# Patient Record
Sex: MALE | Race: WHITE | NOT HISPANIC OR LATINO | Employment: OTHER | URBAN - METROPOLITAN AREA
[De-identification: names, ages, dates, MRNs, and addresses within clinical notes are randomized per-mention and may not be internally consistent; named-entity substitution may affect disease eponyms.]

---

## 2018-07-14 ENCOUNTER — APPOINTMENT (EMERGENCY)
Dept: CT IMAGING | Facility: HOSPITAL | Age: 58
DRG: 175 | End: 2018-07-14
Payer: MEDICARE

## 2018-07-14 ENCOUNTER — HOSPITAL ENCOUNTER (INPATIENT)
Facility: HOSPITAL | Age: 58
LOS: 3 days | Discharge: HOME | DRG: 175 | End: 2018-07-17
Attending: EMERGENCY MEDICINE | Admitting: INTERNAL MEDICINE
Payer: MEDICARE

## 2018-07-14 ENCOUNTER — APPOINTMENT (EMERGENCY)
Dept: RADIOLOGY | Facility: HOSPITAL | Age: 58
DRG: 175 | End: 2018-07-14
Payer: MEDICARE

## 2018-07-14 DIAGNOSIS — I26.99 ACUTE PULMONARY EMBOLISM (HCC): ICD-10-CM

## 2018-07-14 DIAGNOSIS — C22.1 CHOLANGIOCARCINOMA (HCC): ICD-10-CM

## 2018-07-14 DIAGNOSIS — I26.99 OTHER ACUTE PULMONARY EMBOLISM WITHOUT ACUTE COR PULMONALE (HCC): Primary | ICD-10-CM

## 2018-07-14 DIAGNOSIS — I26.99 PULMONARY INFARCTION (HCC): ICD-10-CM

## 2018-07-14 PROBLEM — G93.40 ACUTE ENCEPHALOPATHY: Status: ACTIVE | Noted: 2018-07-14

## 2018-07-14 PROBLEM — R50.9 FEVER: Status: ACTIVE | Noted: 2018-07-14

## 2018-07-14 PROBLEM — E87.1 HYPONATREMIA: Status: ACTIVE | Noted: 2018-07-14

## 2018-07-14 LAB
ALBUMIN SERPL BCP-MCNC: 3.1 G/DL (ref 3.5–5)
ALP SERPL-CCNC: 92 U/L (ref 46–116)
ALT SERPL W P-5'-P-CCNC: 35 U/L (ref 12–78)
AMMONIA PLAS-SCNC: <10 UMOL/L (ref 11–35)
ANION GAP BLD CALC-SCNC: 17 MMOL/L (ref 4–13)
ANION GAP SERPL CALCULATED.3IONS-SCNC: 5 MMOL/L (ref 4–13)
APTT PPP: 45 SECONDS (ref 24–36)
AST SERPL W P-5'-P-CCNC: 49 U/L (ref 5–45)
BACTERIA UR QL AUTO: ABNORMAL /HPF
BASOPHILS # BLD AUTO: 0.01 THOUSANDS/ΜL (ref 0–0.1)
BASOPHILS NFR BLD AUTO: 0 % (ref 0–1)
BILIRUB DIRECT SERPL-MCNC: 0.2 MG/DL (ref 0–0.2)
BILIRUB SERPL-MCNC: 0.6 MG/DL (ref 0.2–1)
BILIRUB UR QL STRIP: NEGATIVE
BUN BLD-MCNC: 13 MG/DL (ref 5–25)
BUN SERPL-MCNC: 13 MG/DL (ref 5–25)
CA-I BLD-SCNC: 1.2 MMOL/L (ref 1.12–1.32)
CALCIUM SERPL-MCNC: 8.6 MG/DL (ref 8.3–10.1)
CHLORIDE BLD-SCNC: 92 MMOL/L (ref 100–108)
CHLORIDE SERPL-SCNC: 97 MMOL/L (ref 100–108)
CLARITY UR: CLEAR
CO2 SERPL-SCNC: 29 MMOL/L (ref 21–32)
COLOR UR: YELLOW
CREAT BLD-MCNC: 1.2 MG/DL (ref 0.6–1.3)
CREAT SERPL-MCNC: 1.33 MG/DL (ref 0.6–1.3)
EOSINOPHIL # BLD AUTO: 0.03 THOUSAND/ΜL (ref 0–0.61)
EOSINOPHIL NFR BLD AUTO: 0 % (ref 0–6)
ERYTHROCYTE [DISTWIDTH] IN BLOOD BY AUTOMATED COUNT: 13.7 % (ref 11.6–15.1)
GFR SERPL CREATININE-BSD FRML MDRD: 59 ML/MIN/1.73SQ M
GFR SERPL CREATININE-BSD FRML MDRD: 67 ML/MIN/1.73SQ M
GLUCOSE SERPL-MCNC: 118 MG/DL (ref 65–140)
GLUCOSE SERPL-MCNC: 119 MG/DL (ref 65–140)
GLUCOSE UR STRIP-MCNC: NEGATIVE MG/DL
HCT VFR BLD AUTO: 33.7 % (ref 36.5–49.3)
HCT VFR BLD CALC: 36 % (ref 36.5–49.3)
HGB BLD-MCNC: 10.9 G/DL (ref 12–17)
HGB BLDA-MCNC: 12.2 G/DL (ref 12–17)
HGB UR QL STRIP.AUTO: ABNORMAL
INR PPP: 1.02 (ref 0.86–1.17)
KETONES UR STRIP-MCNC: NEGATIVE MG/DL
LACTATE SERPL-SCNC: 0.9 MMOL/L (ref 0.5–2)
LEUKOCYTE ESTERASE UR QL STRIP: NEGATIVE
LYMPHOCYTES # BLD AUTO: 0.8 THOUSANDS/ΜL (ref 0.6–4.47)
LYMPHOCYTES NFR BLD AUTO: 8 % (ref 14–44)
MCH RBC QN AUTO: 27.5 PG (ref 26.8–34.3)
MCHC RBC AUTO-ENTMCNC: 32.3 G/DL (ref 31.4–37.4)
MCV RBC AUTO: 85 FL (ref 82–98)
MONOCYTES # BLD AUTO: 1.33 THOUSAND/ΜL (ref 0.17–1.22)
MONOCYTES NFR BLD AUTO: 14 % (ref 4–12)
NEUTROPHILS # BLD AUTO: 7.55 THOUSANDS/ΜL (ref 1.85–7.62)
NEUTS SEG NFR BLD AUTO: 78 % (ref 43–75)
NITRITE UR QL STRIP: NEGATIVE
NON-SQ EPI CELLS URNS QL MICRO: ABNORMAL /HPF
PCO2 BLD: 28 MMOL/L (ref 21–32)
PH UR STRIP.AUTO: 5.5 [PH] (ref 4.5–8)
PLATELET # BLD AUTO: 214 THOUSANDS/UL (ref 149–390)
PMV BLD AUTO: 9.6 FL (ref 8.9–12.7)
POTASSIUM BLD-SCNC: 4 MMOL/L (ref 3.5–5.3)
POTASSIUM SERPL-SCNC: 3.9 MMOL/L (ref 3.5–5.3)
PROT SERPL-MCNC: 6.7 G/DL (ref 6.4–8.2)
PROT UR STRIP-MCNC: NEGATIVE MG/DL
PROTHROMBIN TIME: 13.1 SECONDS (ref 11.8–14.2)
RBC # BLD AUTO: 3.97 MILLION/UL (ref 3.88–5.62)
RBC #/AREA URNS AUTO: ABNORMAL /HPF
SODIUM BLD-SCNC: 133 MMOL/L (ref 136–145)
SODIUM SERPL-SCNC: 131 MMOL/L (ref 136–145)
SP GR UR STRIP.AUTO: 1.01 (ref 1–1.03)
SPECIMEN SOURCE: ABNORMAL
TROPONIN I SERPL-MCNC: <0.02 NG/ML
UROBILINOGEN UR QL STRIP.AUTO: 0.2 E.U./DL
WBC # BLD AUTO: 9.72 THOUSAND/UL (ref 4.31–10.16)
WBC #/AREA URNS AUTO: ABNORMAL /HPF

## 2018-07-14 PROCEDURE — 84484 ASSAY OF TROPONIN QUANT: CPT | Performed by: PHYSICIAN ASSISTANT

## 2018-07-14 PROCEDURE — 96361 HYDRATE IV INFUSION ADD-ON: CPT

## 2018-07-14 PROCEDURE — 99223 1ST HOSP IP/OBS HIGH 75: CPT | Performed by: PHYSICIAN ASSISTANT

## 2018-07-14 PROCEDURE — 80048 BASIC METABOLIC PNL TOTAL CA: CPT | Performed by: EMERGENCY MEDICINE

## 2018-07-14 PROCEDURE — 85014 HEMATOCRIT: CPT

## 2018-07-14 PROCEDURE — 71275 CT ANGIOGRAPHY CHEST: CPT

## 2018-07-14 PROCEDURE — 84145 PROCALCITONIN (PCT): CPT | Performed by: PHYSICIAN ASSISTANT

## 2018-07-14 PROCEDURE — 99285 EMERGENCY DEPT VISIT HI MDM: CPT

## 2018-07-14 PROCEDURE — 83605 ASSAY OF LACTIC ACID: CPT | Performed by: EMERGENCY MEDICINE

## 2018-07-14 PROCEDURE — 36415 COLL VENOUS BLD VENIPUNCTURE: CPT | Performed by: EMERGENCY MEDICINE

## 2018-07-14 PROCEDURE — 80076 HEPATIC FUNCTION PANEL: CPT | Performed by: EMERGENCY MEDICINE

## 2018-07-14 PROCEDURE — 81001 URINALYSIS AUTO W/SCOPE: CPT | Performed by: EMERGENCY MEDICINE

## 2018-07-14 PROCEDURE — 82140 ASSAY OF AMMONIA: CPT | Performed by: EMERGENCY MEDICINE

## 2018-07-14 PROCEDURE — 85730 THROMBOPLASTIN TIME PARTIAL: CPT | Performed by: EMERGENCY MEDICINE

## 2018-07-14 PROCEDURE — 93005 ELECTROCARDIOGRAM TRACING: CPT

## 2018-07-14 PROCEDURE — 85025 COMPLETE CBC W/AUTO DIFF WBC: CPT | Performed by: EMERGENCY MEDICINE

## 2018-07-14 PROCEDURE — 74177 CT ABD & PELVIS W/CONTRAST: CPT

## 2018-07-14 PROCEDURE — 85610 PROTHROMBIN TIME: CPT | Performed by: EMERGENCY MEDICINE

## 2018-07-14 PROCEDURE — 94660 CPAP INITIATION&MGMT: CPT

## 2018-07-14 PROCEDURE — 80047 BASIC METABLC PNL IONIZED CA: CPT

## 2018-07-14 PROCEDURE — 96374 THER/PROPH/DIAG INJ IV PUSH: CPT

## 2018-07-14 PROCEDURE — 87040 BLOOD CULTURE FOR BACTERIA: CPT | Performed by: EMERGENCY MEDICINE

## 2018-07-14 PROCEDURE — 96375 TX/PRO/DX INJ NEW DRUG ADDON: CPT

## 2018-07-14 RX ORDER — HEPARIN SODIUM 1000 [USP'U]/ML
6800 INJECTION, SOLUTION INTRAVENOUS; SUBCUTANEOUS ONCE
Status: COMPLETED | OUTPATIENT
Start: 2018-07-14 | End: 2018-07-14

## 2018-07-14 RX ORDER — LIDOCAINE 50 MG/G
1 PATCH TOPICAL DAILY
Status: DISCONTINUED | OUTPATIENT
Start: 2018-07-14 | End: 2018-07-17 | Stop reason: HOSPADM

## 2018-07-14 RX ORDER — OXYCODONE HYDROCHLORIDE 5 MG/1
5 CAPSULE ORAL EVERY 4 HOURS PRN
COMMUNITY
End: 2018-07-17 | Stop reason: HOSPADM

## 2018-07-14 RX ORDER — MORPHINE SULFATE 10 MG/ML
6 INJECTION, SOLUTION INTRAMUSCULAR; INTRAVENOUS ONCE
Status: COMPLETED | OUTPATIENT
Start: 2018-07-14 | End: 2018-07-14

## 2018-07-14 RX ORDER — BUPROPION HYDROCHLORIDE 100 MG/1
150 TABLET ORAL 2 TIMES DAILY
Status: ON HOLD | COMMUNITY
End: 2020-11-06 | Stop reason: SDUPTHER

## 2018-07-14 RX ORDER — LEVOCETIRIZINE DIHYDROCHLORIDE 5 MG/1
10 TABLET, FILM COATED ORAL DAILY PRN
COMMUNITY
End: 2021-02-05 | Stop reason: ALTCHOICE

## 2018-07-14 RX ORDER — ONDANSETRON 2 MG/ML
4 INJECTION INTRAMUSCULAR; INTRAVENOUS ONCE
Status: COMPLETED | OUTPATIENT
Start: 2018-07-14 | End: 2018-07-14

## 2018-07-14 RX ORDER — HEPARIN SODIUM 1000 [USP'U]/ML
3400 INJECTION, SOLUTION INTRAVENOUS; SUBCUTANEOUS AS NEEDED
Status: DISCONTINUED | OUTPATIENT
Start: 2018-07-14 | End: 2018-07-16

## 2018-07-14 RX ORDER — OXYCODONE HYDROCHLORIDE 5 MG/1
5 TABLET ORAL EVERY 6 HOURS PRN
Status: DISCONTINUED | OUTPATIENT
Start: 2018-07-14 | End: 2018-07-15

## 2018-07-14 RX ORDER — ONDANSETRON 2 MG/ML
4 INJECTION INTRAMUSCULAR; INTRAVENOUS EVERY 6 HOURS PRN
Status: DISCONTINUED | OUTPATIENT
Start: 2018-07-14 | End: 2018-07-17 | Stop reason: HOSPADM

## 2018-07-14 RX ORDER — HEPARIN SODIUM 10000 [USP'U]/100ML
3-30 INJECTION, SOLUTION INTRAVENOUS
Status: DISCONTINUED | OUTPATIENT
Start: 2018-07-14 | End: 2018-07-16

## 2018-07-14 RX ORDER — ACAMPROSATE CALCIUM 333 MG/1
666 TABLET, DELAYED RELEASE ORAL 3 TIMES DAILY
Status: DISCONTINUED | OUTPATIENT
Start: 2018-07-14 | End: 2018-07-17 | Stop reason: HOSPADM

## 2018-07-14 RX ORDER — OLANZAPINE 10 MG/1
10 TABLET ORAL
COMMUNITY
End: 2020-11-08 | Stop reason: HOSPADM

## 2018-07-14 RX ORDER — LANOLIN ALCOHOL/MO/W.PET/CERES
1000 CREAM (GRAM) TOPICAL DAILY
COMMUNITY
End: 2020-11-08 | Stop reason: HOSPADM

## 2018-07-14 RX ORDER — MORPHINE SULFATE 2 MG/ML
2 INJECTION, SOLUTION INTRAMUSCULAR; INTRAVENOUS EVERY 4 HOURS PRN
Status: DISCONTINUED | OUTPATIENT
Start: 2018-07-14 | End: 2018-07-17 | Stop reason: HOSPADM

## 2018-07-14 RX ORDER — PANTOPRAZOLE SODIUM 40 MG/1
40 TABLET, DELAYED RELEASE ORAL
Status: DISCONTINUED | OUTPATIENT
Start: 2018-07-15 | End: 2018-07-17 | Stop reason: HOSPADM

## 2018-07-14 RX ORDER — FENTANYL 50 UG/H
50 PATCH TRANSDERMAL
Status: DISCONTINUED | OUTPATIENT
Start: 2018-07-16 | End: 2018-07-17 | Stop reason: HOSPADM

## 2018-07-14 RX ORDER — GABAPENTIN 300 MG/1
300 CAPSULE ORAL 2 TIMES DAILY
Status: DISCONTINUED | OUTPATIENT
Start: 2018-07-14 | End: 2018-07-17 | Stop reason: HOSPADM

## 2018-07-14 RX ORDER — ACAMPROSATE CALCIUM 333 MG/1
666 TABLET, DELAYED RELEASE ORAL 3 TIMES DAILY
COMMUNITY

## 2018-07-14 RX ORDER — LORAZEPAM 1 MG/1
1 TABLET ORAL EVERY 8 HOURS PRN
Status: DISCONTINUED | OUTPATIENT
Start: 2018-07-14 | End: 2018-07-17 | Stop reason: HOSPADM

## 2018-07-14 RX ORDER — HEPARIN SODIUM 1000 [USP'U]/ML
6800 INJECTION, SOLUTION INTRAVENOUS; SUBCUTANEOUS AS NEEDED
Status: DISCONTINUED | OUTPATIENT
Start: 2018-07-14 | End: 2018-07-16

## 2018-07-14 RX ORDER — MONTELUKAST SODIUM 10 MG/1
10 TABLET ORAL
Status: DISCONTINUED | OUTPATIENT
Start: 2018-07-14 | End: 2018-07-17 | Stop reason: HOSPADM

## 2018-07-14 RX ORDER — LORATADINE 10 MG/1
10 TABLET ORAL DAILY
Status: DISCONTINUED | OUTPATIENT
Start: 2018-07-15 | End: 2018-07-17 | Stop reason: HOSPADM

## 2018-07-14 RX ORDER — BUPROPION HYDROCHLORIDE 150 MG/1
300 TABLET ORAL DAILY
Status: DISCONTINUED | OUTPATIENT
Start: 2018-07-15 | End: 2018-07-14

## 2018-07-14 RX ORDER — BUPROPION HYDROCHLORIDE 100 MG/1
150 TABLET ORAL
COMMUNITY
End: 2020-10-29

## 2018-07-14 RX ORDER — CHOLECALCIFEROL (VITAMIN D3) 125 MCG
1000 CAPSULE ORAL DAILY
Status: DISCONTINUED | OUTPATIENT
Start: 2018-07-15 | End: 2018-07-17 | Stop reason: HOSPADM

## 2018-07-14 RX ORDER — PANTOPRAZOLE SODIUM 40 MG/1
80 TABLET, DELAYED RELEASE ORAL 2 TIMES DAILY
COMMUNITY

## 2018-07-14 RX ORDER — BUPROPION HYDROCHLORIDE 150 MG/1
300 TABLET, EXTENDED RELEASE ORAL DAILY
Status: DISCONTINUED | OUTPATIENT
Start: 2018-07-15 | End: 2018-07-17 | Stop reason: HOSPADM

## 2018-07-14 RX ORDER — TRAZODONE HYDROCHLORIDE 150 MG/1
150 TABLET ORAL
COMMUNITY
End: 2021-01-06 | Stop reason: SDUPTHER

## 2018-07-14 RX ORDER — GABAPENTIN 300 MG/1
300 CAPSULE ORAL 2 TIMES DAILY
COMMUNITY

## 2018-07-14 RX ORDER — OLANZAPINE 10 MG/1
10 TABLET ORAL
Status: DISCONTINUED | OUTPATIENT
Start: 2018-07-14 | End: 2018-07-17 | Stop reason: HOSPADM

## 2018-07-14 RX ORDER — SODIUM CHLORIDE 9 MG/ML
75 INJECTION, SOLUTION INTRAVENOUS ONCE
Status: COMPLETED | OUTPATIENT
Start: 2018-07-14 | End: 2018-07-15

## 2018-07-14 RX ORDER — LORAZEPAM 1 MG/1
1 TABLET ORAL EVERY 8 HOURS PRN
COMMUNITY
End: 2020-11-08 | Stop reason: HOSPADM

## 2018-07-14 RX ORDER — BUPROPION HYDROCHLORIDE 150 MG/1
150 TABLET, EXTENDED RELEASE ORAL
Status: DISCONTINUED | OUTPATIENT
Start: 2018-07-14 | End: 2018-07-17 | Stop reason: HOSPADM

## 2018-07-14 RX ORDER — MONTELUKAST SODIUM 10 MG/1
10 TABLET ORAL
COMMUNITY
End: 2021-02-05 | Stop reason: ALTCHOICE

## 2018-07-14 RX ADMIN — OXYCODONE HYDROCHLORIDE 5 MG: 5 TABLET ORAL at 23:29

## 2018-07-14 RX ADMIN — LIDOCAINE 1 PATCH: 50 PATCH CUTANEOUS at 22:23

## 2018-07-14 RX ADMIN — OLANZAPINE 10 MG: 10 TABLET, FILM COATED ORAL at 22:23

## 2018-07-14 RX ADMIN — MORPHINE SULFATE 6 MG: 10 INJECTION INTRAVENOUS at 20:39

## 2018-07-14 RX ADMIN — LORAZEPAM 1 MG: 1 TABLET ORAL at 22:23

## 2018-07-14 RX ADMIN — IOHEXOL 100 ML: 350 INJECTION, SOLUTION INTRAVENOUS at 19:44

## 2018-07-14 RX ADMIN — MORPHINE SULFATE 6 MG: 10 INJECTION INTRAVENOUS at 19:11

## 2018-07-14 RX ADMIN — HEPARIN SODIUM 6800 UNITS: 1000 INJECTION, SOLUTION INTRAVENOUS; SUBCUTANEOUS at 20:56

## 2018-07-14 RX ADMIN — TRAZODONE HYDROCHLORIDE 150 MG: 50 TABLET, FILM COATED ORAL at 23:29

## 2018-07-14 RX ADMIN — MONTELUKAST SODIUM 10 MG: 10 TABLET, FILM COATED ORAL at 22:23

## 2018-07-14 RX ADMIN — SODIUM CHLORIDE 75 ML/HR: 0.9 INJECTION, SOLUTION INTRAVENOUS at 22:26

## 2018-07-14 RX ADMIN — MORPHINE SULFATE 2 MG: 2 INJECTION, SOLUTION INTRAMUSCULAR; INTRAVENOUS at 22:23

## 2018-07-14 RX ADMIN — SODIUM CHLORIDE 1000 ML: 0.9 INJECTION, SOLUTION INTRAVENOUS at 19:05

## 2018-07-14 RX ADMIN — ONDANSETRON 4 MG: 2 INJECTION INTRAMUSCULAR; INTRAVENOUS at 19:11

## 2018-07-14 RX ADMIN — HEPARIN SODIUM AND DEXTROSE 18 UNITS/KG/HR: 10000; 5 INJECTION INTRAVENOUS at 20:57

## 2018-07-14 RX ADMIN — GABAPENTIN 300 MG: 300 CAPSULE ORAL at 22:25

## 2018-07-15 ENCOUNTER — APPOINTMENT (INPATIENT)
Dept: RADIOLOGY | Facility: HOSPITAL | Age: 58
DRG: 175 | End: 2018-07-15
Payer: MEDICARE

## 2018-07-15 LAB
ANION GAP SERPL CALCULATED.3IONS-SCNC: 7 MMOL/L (ref 4–13)
APTT PPP: 177 SECONDS (ref 24–36)
APTT PPP: 52 SECONDS (ref 24–36)
APTT PPP: 65 SECONDS (ref 24–36)
BUN SERPL-MCNC: 11 MG/DL (ref 5–25)
CALCIUM SERPL-MCNC: 8.3 MG/DL (ref 8.3–10.1)
CHLORIDE SERPL-SCNC: 98 MMOL/L (ref 100–108)
CO2 SERPL-SCNC: 28 MMOL/L (ref 21–32)
CREAT SERPL-MCNC: 1.02 MG/DL (ref 0.6–1.3)
ERYTHROCYTE [DISTWIDTH] IN BLOOD BY AUTOMATED COUNT: 13.8 % (ref 11.6–15.1)
FOLATE SERPL-MCNC: 11.9 NG/ML (ref 3.1–17.5)
GFR SERPL CREATININE-BSD FRML MDRD: 81 ML/MIN/1.73SQ M
GLUCOSE SERPL-MCNC: 102 MG/DL (ref 65–140)
HCT VFR BLD AUTO: 29.7 % (ref 36.5–49.3)
HGB BLD-MCNC: 9.4 G/DL (ref 12–17)
MCH RBC QN AUTO: 27.1 PG (ref 26.8–34.3)
MCHC RBC AUTO-ENTMCNC: 31.6 G/DL (ref 31.4–37.4)
MCV RBC AUTO: 86 FL (ref 82–98)
PLATELET # BLD AUTO: 200 THOUSANDS/UL (ref 149–390)
PMV BLD AUTO: 10.4 FL (ref 8.9–12.7)
POTASSIUM SERPL-SCNC: 3.8 MMOL/L (ref 3.5–5.3)
PROCALCITONIN SERPL-MCNC: 0.27 NG/ML
RBC # BLD AUTO: 3.47 MILLION/UL (ref 3.88–5.62)
SODIUM SERPL-SCNC: 133 MMOL/L (ref 136–145)
TROPONIN I SERPL-MCNC: <0.02 NG/ML
TROPONIN I SERPL-MCNC: <0.02 NG/ML
TSH SERPL DL<=0.05 MIU/L-ACNC: 3.6 UIU/ML (ref 0.36–3.74)
VIT B12 SERPL-MCNC: 854 PG/ML (ref 100–900)
WBC # BLD AUTO: 6.62 THOUSAND/UL (ref 4.31–10.16)

## 2018-07-15 PROCEDURE — 74022 RADEX COMPL AQT ABD SERIES: CPT

## 2018-07-15 PROCEDURE — 82607 VITAMIN B-12: CPT | Performed by: PHYSICIAN ASSISTANT

## 2018-07-15 PROCEDURE — 84443 ASSAY THYROID STIM HORMONE: CPT | Performed by: PHYSICIAN ASSISTANT

## 2018-07-15 PROCEDURE — 99223 1ST HOSP IP/OBS HIGH 75: CPT | Performed by: INTERNAL MEDICINE

## 2018-07-15 PROCEDURE — 94660 CPAP INITIATION&MGMT: CPT

## 2018-07-15 PROCEDURE — 85730 THROMBOPLASTIN TIME PARTIAL: CPT | Performed by: PHYSICIAN ASSISTANT

## 2018-07-15 PROCEDURE — 99232 SBSQ HOSP IP/OBS MODERATE 35: CPT | Performed by: INTERNAL MEDICINE

## 2018-07-15 PROCEDURE — 84484 ASSAY OF TROPONIN QUANT: CPT | Performed by: PHYSICIAN ASSISTANT

## 2018-07-15 PROCEDURE — 85303 CLOT INHIBIT PROT C ACTIVITY: CPT | Performed by: INTERNAL MEDICINE

## 2018-07-15 PROCEDURE — 82746 ASSAY OF FOLIC ACID SERUM: CPT | Performed by: PHYSICIAN ASSISTANT

## 2018-07-15 PROCEDURE — 85306 CLOT INHIBIT PROT S FREE: CPT | Performed by: INTERNAL MEDICINE

## 2018-07-15 PROCEDURE — 85730 THROMBOPLASTIN TIME PARTIAL: CPT | Performed by: INTERNAL MEDICINE

## 2018-07-15 PROCEDURE — 81241 F5 GENE: CPT | Performed by: INTERNAL MEDICINE

## 2018-07-15 PROCEDURE — 80048 BASIC METABOLIC PNL TOTAL CA: CPT | Performed by: PHYSICIAN ASSISTANT

## 2018-07-15 PROCEDURE — 85027 COMPLETE CBC AUTOMATED: CPT | Performed by: PHYSICIAN ASSISTANT

## 2018-07-15 PROCEDURE — 86147 CARDIOLIPIN ANTIBODY EA IG: CPT | Performed by: INTERNAL MEDICINE

## 2018-07-15 RX ORDER — DRONABINOL 2.5 MG/1
10 CAPSULE ORAL
Status: DISCONTINUED | OUTPATIENT
Start: 2018-07-15 | End: 2018-07-17 | Stop reason: HOSPADM

## 2018-07-15 RX ORDER — OXYCODONE HYDROCHLORIDE 10 MG/1
10 TABLET ORAL EVERY 6 HOURS PRN
Status: DISCONTINUED | OUTPATIENT
Start: 2018-07-15 | End: 2018-07-17 | Stop reason: HOSPADM

## 2018-07-15 RX ADMIN — MORPHINE SULFATE 2 MG: 2 INJECTION, SOLUTION INTRAMUSCULAR; INTRAVENOUS at 12:48

## 2018-07-15 RX ADMIN — HEPARIN SODIUM 3400 UNITS: 1000 INJECTION, SOLUTION INTRAVENOUS; SUBCUTANEOUS at 17:41

## 2018-07-15 RX ADMIN — OXYCODONE HYDROCHLORIDE 10 MG: 10 TABLET ORAL at 22:26

## 2018-07-15 RX ADMIN — TRAZODONE HYDROCHLORIDE 150 MG: 50 TABLET, FILM COATED ORAL at 22:25

## 2018-07-15 RX ADMIN — ACAMPROSATE CALCIUM 666 MG: 333 TABLET, DELAYED RELEASE ORAL at 22:27

## 2018-07-15 RX ADMIN — MORPHINE SULFATE 2 MG: 2 INJECTION, SOLUTION INTRAMUSCULAR; INTRAVENOUS at 07:42

## 2018-07-15 RX ADMIN — PANTOPRAZOLE SODIUM 40 MG: 40 TABLET, DELAYED RELEASE ORAL at 16:30

## 2018-07-15 RX ADMIN — CYANOCOBALAMIN TAB 500 MCG 1000 MCG: 500 TAB at 08:27

## 2018-07-15 RX ADMIN — BUPROPION HYDROCHLORIDE 300 MG: 150 TABLET, FILM COATED, EXTENDED RELEASE ORAL at 08:27

## 2018-07-15 RX ADMIN — DRONABINOL 10 MG: 2.5 CAPSULE ORAL at 17:55

## 2018-07-15 RX ADMIN — GABAPENTIN 300 MG: 300 CAPSULE ORAL at 08:27

## 2018-07-15 RX ADMIN — HEPARIN SODIUM AND DEXTROSE 15 UNITS/KG/HR: 10000; 5 INJECTION INTRAVENOUS at 15:26

## 2018-07-15 RX ADMIN — LIDOCAINE 1 PATCH: 50 PATCH CUTANEOUS at 22:27

## 2018-07-15 RX ADMIN — PANTOPRAZOLE SODIUM 40 MG: 40 TABLET, DELAYED RELEASE ORAL at 08:28

## 2018-07-15 RX ADMIN — LORATADINE 10 MG: 10 TABLET ORAL at 08:27

## 2018-07-15 RX ADMIN — OLANZAPINE 10 MG: 10 TABLET, FILM COATED ORAL at 22:26

## 2018-07-15 RX ADMIN — MONTELUKAST SODIUM 10 MG: 10 TABLET, FILM COATED ORAL at 08:34

## 2018-07-15 RX ADMIN — GABAPENTIN 300 MG: 300 CAPSULE ORAL at 16:30

## 2018-07-15 RX ADMIN — LORAZEPAM 1 MG: 1 TABLET ORAL at 08:34

## 2018-07-15 RX ADMIN — OXYCODONE HYDROCHLORIDE 10 MG: 10 TABLET ORAL at 16:29

## 2018-07-15 RX ADMIN — OXYCODONE HYDROCHLORIDE 5 MG: 5 TABLET ORAL at 11:03

## 2018-07-15 RX ADMIN — MORPHINE SULFATE 2 MG: 2 INJECTION, SOLUTION INTRAMUSCULAR; INTRAVENOUS at 21:02

## 2018-07-15 RX ADMIN — LORAZEPAM 1 MG: 1 TABLET ORAL at 21:03

## 2018-07-15 RX ADMIN — ACAMPROSATE CALCIUM 666 MG: 333 TABLET, DELAYED RELEASE ORAL at 08:29

## 2018-07-15 RX ADMIN — ACAMPROSATE CALCIUM 666 MG: 333 TABLET, DELAYED RELEASE ORAL at 16:31

## 2018-07-15 NOTE — PROGRESS NOTES
[facility] Progress Note  Patient: Amadeo Watson  62 y o  male   MRN: [id number]  PCP: Modesto Gregg MD  Unit/Bed#: -01 Encounter: [id number]  Date Of Visit: 07/15/18    Assessment:    Principal Problem:    Acute pulmonary embolism ([facility] )  Active Problems:    Cholangiocarcinoma ([facility] )    Hyponatremia    Fever    Acute encephalopathy    SHELLIE (obstructive sleep apnea)      Plan:    · Acute Pulmonary Embolism with Pulmonary Infarction -   · Anticoagulation - Heparin drip with intended plan to change to Xarelto on discharge (per discussion between ER and [facility] oncologist 7/14/2018)  Prescriptions to case management to see about costs  · Hypercoagulability panel - Follow up Protein C, Protein S, cardiolipin antibody, Factor V Leiden ordered and pending  · Oxygenation / Ventilation - 95-96% on room air  · Pain Control -   · Oxycodone and morphine available  Will increase the oxycodone to 10 mg dose  · Monitor Pain levels  · Anemia - This may be chronic from as far back as 2015  Continue to monitor counts and if dropping below 9, additional workup may be needed  CBC in am   · Acute Encephalopathy POA -   · Per wife, this is better today  Continue to monitor  · Negative Testing - NH3, TSH, Urinalysis  · Additional Testing -   · Medication Review - Wellbutrin, Campral, Gabapentin, Zyprexa, loratidine, Trazodone, oxycodone, and ativan are all potential contributors  Currently also morphine is an additional cause potentially  · Hyponatremia - Improved with IV fluids  Continue to monitor  · Abdominal distention - because increase tympany on percussion, will start with obstruction series xray  If this is negative get CT abdomen or abdominal ultrasound to evaluate for ascites  Monitor BMs  He has never had problems with ascites before but has liver disease and has been on IV fluids  · History of Cholangiocarcinoma - patient is s/p liver resection, chemotherapy, and radiation in 2016    Follows with [facility] and is not currently on any active therapy  Surveillance with CT scans every 3 months  VTE Pharmacologic Prophylaxis:   Pharmacologic: Heparin Drip  Mechanical VTE Prophylaxis in Place: Yes    Patient Centered Rounds: I have performed bedside rounds with nursing staff today  Discussions with Specialists or Other Care Team Provider: None    Education and Discussions with Family / Patient: updated wife at bedside  Time Spent for Care: 30 minutes  More than 50% of total time spent on counseling and coordination of care as described above  Current Length of Stay: 1 day(s)    Current Patient Status: Inpatient   Certification Statement: The patient will continue to require additional inpatient hospital stay due to evaluate patient for pulmonary embolism  Discharge Plan / Estimated Discharge Date: Next 24 hours? Code Status: Level 1 - Full Code      Subjective: The patient is currently on oxygen but earlier was on room air and saturating well  He denies any dyspnea  He does have sleep apnea and states he woke up on oxygen again  The patient has no nausea, headache, vomiting, or palpitations  He has pleuritic chest pain right lower chest   The patients chest pain is reproducible with palpation of chest   He coughed up coin sized amount of blood earlier today  Objective:     Vitals:   Temp (24hrs), Av 8 °F (37 7 °C), Min:98 9 °F (37 2 °C), Max:100 4 °F (38 °C)    HR:  [] 72  Resp:  [18] 18  BP: ()/(54-61) 111/55  SpO2:  [94 %-97 %] 95 %  Body mass index is 26 76 kg/m²  Input and Output Summary (last 24 hours): Intake/Output Summary (Last 24 hours) at 07/15/18 1443  Last data filed at 18 2300   Gross per 24 hour   Intake                0 ml   Output                0 ml   Net                0 ml       Physical Exam:     Physical Exam   Constitutional: He is oriented to person, place, and time  No distress     HENT:   Mouth/Throat: Oropharynx is clear and moist  No oropharyngeal exudate  Eyes: Conjunctivae are normal  Pupils are equal, round, and reactive to light  Neck: No JVD present  Cardiovascular: Normal rate and regular rhythm  No murmur heard  Pulmonary/Chest: Effort normal  He has no wheezes  Slight rales in right lower lung field posteriorly  Abdominal: He exhibits distension  There is no tenderness  Slightly firm  Hyperresonance to percussion  Slightly hyperactive bowel sounds  Neurological: He is alert and oriented to person, place, and time  No cranial nerve deficit  Skin: Skin is warm and dry  No rash noted  No pallor  Vitals reviewed  Additional Data:     Labs:      Results from last 7 days  Lab Units 07/15/18  0518  07/14/18  1906   WBC Thousand/uL 6 62  --  9 72   HEMOGLOBIN g/dL 9 4*  --  10 9*   I STAT HEMOGLOBIN   --   < >  --    HEMATOCRIT % 29 7*  --  33 7*   PLATELETS Thousands/uL 200  --  214   NEUTROS PCT %  --   --  78*   LYMPHS PCT %  --   --  8*   MONOS PCT %  --   --  14*   EOS PCT %  --   --  0   < > = values in this interval not displayed  Results from last 7 days  Lab Units 07/15/18  0518  07/14/18  1906   SODIUM mmol/L 133*  --  131*   POTASSIUM mmol/L 3 8  --  3 9   CHLORIDE mmol/L 98*  --  97*   CO2 mmol/L 28  --  29   BUN mg/dL 11  --  13   CREATININE mg/dL 1 02  --  1 33*   CALCIUM mg/dL 8 3  --  8 6   TOTAL PROTEIN g/dL  --   --  6 7   BILIRUBIN TOTAL mg/dL  --   --  0 60   ALK PHOS U/L  --   --  92   ALT U/L  --   --  35   AST U/L  --   --  49*   GLUCOSE RANDOM mg/dL 102  --  119   GLUCOSE, ISTAT   --   < >  --    < > = values in this interval not displayed  Results from last 7 days  Lab Units 07/14/18  1906   INR  1 02       * I Have Reviewed All Lab Data Listed Above  * Additional Pertinent Lab Tests Reviewed:  All Labs For Current Hospital Admission Reviewed    Imaging:    Imaging Reports Reviewed Today Include: CT Chest  Imaging Personally Reviewed by Myself Includes: None    Recent Cultures (last 7 days):           Last 24 Hours Medication List:     Current Facility-Administered Medications:  acamprosate 666 mg Oral TID Russ Waller PA-C    buPROPion 150 mg Oral HS Russ Waller PA-C    buPROPion 300 mg Oral Daily Russ Waller PA-C    cyanocobalamin 1,000 mcg Oral Daily Allyn Hutchinson    [START ON 7/16/2018] fentaNYL 50 mcg Transdermal Q72H Russ Waller PA-C    gabapentin 300 mg Oral BID Russ Waller PA-C    heparin (porcine) 3-30 Units/kg/hr (Order-Specific) Intravenous Titrated Luna Callow, DO Last Rate: 15 Units/kg/hr (07/15/18 0459)   heparin (porcine) 3,400 Units Intravenous PRN Luna Callow, DO    heparin (porcine) 6,800 Units Intravenous PRN Luna Callow, DO    lidocaine 1 patch Transdermal Daily Russ Waller PA-C    loratadine 10 mg Oral Daily Yara Kelley PA-C    LORazepam 1 mg Oral Q8H PRN Russ Waller PA-C    montelukast 10 mg Oral HS Russ Waller PA-C    morphine injection 2 mg Intravenous Q4H PRN Russ Waller PA-C    OLANZapine 10 mg Oral HS Yara Kelley PA-C    ondansetron 4 mg Intravenous Q6H PRN Russ Waller PA-C    oxyCODONE 5 mg Oral Q6H PRN Russ Waller PA-C    pantoprazole 40 mg Oral BID AC Russ Waller PA-C    traZODone 150 mg Oral HS Russ Waller PA-C         Today, Patient Was Seen By: Merritt Vaughan DO    ** Please Note: This note has been constructed using a voice recognition system   **

## 2018-07-15 NOTE — ASSESSMENT & PLAN NOTE
· Temp of 100 4 on presentation  Also tachycardic on presentation and noted by family to have increased confusion today  · Continue to monitor vital signs and neuro checks  · No leukocytosis, lactic acid WNL  · CTA chest: Consolidation in the right lower lobe peripherally suspicious for infarct with groundglass in the right lower lobe suggesting ischemia  · Check urinalysis/ urine culture  · Follow-up on blood cultures  · Check procalcitonin

## 2018-07-15 NOTE — ASSESSMENT & PLAN NOTE
· S/p liver resection, chemotherapy and radiation therapy in 2016  · Follows with oncology at [facility]  Not currently receiving therapy, on observation with CT scans every 3 months

## 2018-07-15 NOTE — PROGRESS NOTES
Pt ambulated around half of the unit with daughter, tolerated the activity, will continue to monitor

## 2018-07-15 NOTE — ASSESSMENT & PLAN NOTE
· Presented with right sided chest pain and SOB with exertion  · CTA chest: Acute right lower lobe lobar and segmental pulmonary emboli  Measured RV/LV ratio is within normal limits at less than 0 9  Consolidation in the right lower lobe peripherally suspicious for infarct with groundglass in the right lower lobe suggesting ischemia  · Obtain echocardiogram   · Continue IV heparin  · Per discussion with ED physician who spoke with patient's oncologist at [facility], patient should be transitioned to [address] [place] [address] on discharge  · Patient with reported hemoptysis today, possibly due to PE  Pulmonology consulted  · Pain control with lidocaine patch, prn oxycodone and prn morphine in addition to home medication, Fentanyl patch  · Pulse ox stable on 2L nasal cannula  · Continue to monitor  Ambulatory pulse ox prior to d/c

## 2018-07-15 NOTE — ED PROVIDER NOTES
History  Chief Complaint   Patient presents with    Abdominal Pain     pt reports "severe abd pain" starting friday night  Reports R lateral across epigastric  reprots nausea, no vomiting  Pt lives in [place], is under care [facility], was told to come as close to them as possible  Pt has hx liver resection  History provided by:  Patient, relative and spouse  Abdominal Pain   Pain location:  RUQ  Pain quality: aching and sharp    Pain radiates to:  Does not radiate  Pain severity:  Severe  Onset quality:  Gradual  Duration:  3 days  Timing:  Intermittent  Progression:  Worsening  Chronicity:  New  Context comment:  Extensive cancer history, sharp severe right upper quadrant abdominal pain  Relieved by:  Nothing  Worsened by:  Deep breathing and palpation  Ineffective treatments:  None tried  Associated symptoms: no chest pain, no chills, no constipation, no cough, no diarrhea, no dysuria, no fever, no nausea, no shortness of breath, no sore throat and no vomiting        Prior to Admission Medications   Prescriptions Last Dose Informant Patient Reported? Taking?    LORazepam (ATIVAN) 1 mg tablet   Yes Yes   Sig: Take 1 mg by mouth every 8 (eight) hours as needed for anxiety   OLANZapine (ZyPREXA) 10 mg tablet   Yes Yes   Sig: Take 10 mg by mouth daily at bedtime   acamprosate (CAMPRAL) 333 mg tablet   Yes Yes   Sig: Take 666 mg by mouth 3 (three) times a day   buPROPion (WELLBUTRIN) 100 mg tablet   Yes Yes   Sig: Take 300 mg by mouth daily   buPROPion (WELLBUTRIN) 100 mg tablet   Yes Yes   Sig: Take 150 mg by mouth daily at bedtime   cyanocobalamin (VITAMIN B-12) 1,000 mcg tablet   Yes Yes   Sig: Take 1,000 mcg by mouth daily   fentaNYL (DURAGESIC) 75 mcg/hr   Yes Yes   Sig: Place 1 patch on the skin every third day   gabapentin (NEURONTIN) 300 mg capsule   Yes Yes   Sig: Take 300 mg by mouth 2 (two) times a day   levocetirizine (XYZAL) 5 MG tablet   Yes Yes   Sig: Take 10 mg by mouth every morning   milk thistle 175 MG tablet   Yes Yes   Sig: Take 175 mg by mouth 2 (two) times a day   montelukast (SINGULAIR) 10 mg tablet   Yes Yes   Sig: Take 10 mg by mouth daily at bedtime   oxyCODONE (OXY-IR) 5 MG capsule   Yes Yes   Sig: Take 5 mg by mouth every 4 (four) hours as needed for moderate pain   pantoprazole (PROTONIX) 40 mg tablet   Yes Yes   Sig: Take 40 mg by mouth 2 (two) times a day   traZODone (DESYREL) 150 mg tablet   Yes Yes   Sig: Take 150 mg by mouth daily at bedtime      Facility-Administered Medications: None       Past Medical History:   Diagnosis Date    [facility] [address] [facility]     Peptic ulcer     Zollinger-Waddell syndrome        Past Surgical History:   Procedure Laterality Date    LIVER RESECTION      STOMACH SURGERY         History reviewed  No pertinent family history  I have reviewed and agree with the history as documented  Social History   Substance Use Topics    Smoking status: Not on file    Smokeless tobacco: Not on file    Alcohol use Not on file        Review of Systems   Constitutional: Negative for activity change, chills, diaphoresis and fever  HENT: Negative for congestion, sinus pressure and sore throat  Eyes: Negative for pain and visual disturbance  Respiratory: Negative for cough, chest tightness, shortness of breath, wheezing and stridor  Cardiovascular: Negative for chest pain and palpitations  Gastrointestinal: Positive for abdominal pain  Negative for abdominal distention, constipation, diarrhea, nausea and vomiting  Genitourinary: Negative for dysuria and frequency  Musculoskeletal: Negative for neck pain and neck stiffness  Skin: Negative for rash  Neurological: Negative for dizziness, speech difficulty, light-headedness, numbness and headaches  Physical Exam  Physical Exam   Constitutional: He is oriented to person, place, and time  He appears well-developed  He appears distressed  HENT:   Head: Normocephalic and atraumatic     Eyes: Pupils are equal, round, and reactive to light  Neck: Normal range of motion  Neck supple  No tracheal deviation present  Cardiovascular: Normal rate, regular rhythm, normal heart sounds and intact distal pulses  No murmur heard  Pulmonary/Chest: Effort normal and breath sounds normal  No stridor  No respiratory distress  Abdominal: Soft  He exhibits no distension  There is tenderness  There is no rebound and no guarding  Right upper quadrant   Musculoskeletal: Normal range of motion  Neurological: He is alert and oriented to person, place, and time  Skin: Skin is warm and dry  He is not diaphoretic  No erythema  No pallor  Psychiatric: He has a normal mood and affect  Vitals reviewed        Vital Signs  ED Triage Vitals   Temperature Pulse Respirations Blood Pressure SpO2   07/14/18 1841 07/14/18 1840 07/14/18 1840 07/14/18 1840 07/14/18 1840   100 4 °F (38 °C) 100 18 132/58 95 %      Temp Source Heart Rate Source Patient Position - Orthostatic VS BP Location FiO2 (%)   07/14/18 1841 07/14/18 1840 07/14/18 1840 07/14/18 1840 --   Oral Monitor Lying Right arm       Pain Score       07/14/18 1841       Worst Possible Pain           Vitals:    07/14/18 2034 07/14/18 2100 07/14/18 2130 07/14/18 2159   BP: 98/54 131/60 106/55 121/58   Pulse: 83 86 82 86   Patient Position - Orthostatic VS: Lying   Lying       Visual Acuity  Visual Acuity      Most Recent Value   L Pupil Size (mm)  (P) 3   R Pupil Size (mm)  (P) 3   L Pupil Shape  (P) Round   R Pupil Shape  (P) Round          ED Medications  Medications   heparin (porcine) 25,000 units in 250 mL infusion (premix) (18 Units/kg/hr × 85 kg (Order-Specific) Intravenous New Bag 7/14/18 2057)   heparin (porcine) injection 6,800 Units (not administered)   heparin (porcine) injection 3,400 Units (not administered)   acamprosate (CAMPRAL) DR tablet 666 mg (666 mg Oral Not Given 7/14/18 2206)   buPROPion (WELLBUTRIN SR) 12 hr tablet 150 mg (150 mg Oral Not Given 7/14/18 2224)   cyanocobalamin (VITAMIN B-12) tablet 1,000 mcg (not administered)   gabapentin (NEURONTIN) capsule 300 mg (300 mg Oral Given 7/14/18 2225)   loratadine (CLARITIN) tablet 10 mg (not administered)   montelukast (SINGULAIR) tablet 10 mg (10 mg Oral Given 7/14/18 2223)   OLANZapine (ZyPREXA) tablet 10 mg (10 mg Oral Given 7/14/18 2223)   oxyCODONE (ROXICODONE) IR tablet 5 mg (not administered)   pantoprazole (PROTONIX) EC tablet 40 mg (not administered)   traZODone (DESYREL) tablet 150 mg (not administered)   LORazepam (ATIVAN) tablet 1 mg (1 mg Oral Given 7/14/18 2223)   fentaNYL (DURAGESIC) 50 mcg/hr TD 72 hr patch 50 mcg (not administered)   ondansetron (ZOFRAN) injection 4 mg (not administered)   lidocaine (LIDODERM) 5 % patch 1 patch (1 patch Transdermal Medication Applied 7/14/18 2223)   morphine injection 2 mg (2 mg Intravenous Given 7/14/18 2223)   buPROPion (WELLBUTRIN SR) 12 hr tablet 300 mg (not administered)   sodium chloride 0 9 % bolus 1,000 mL (0 mL Intravenous Stopped 7/14/18 2120)   ondansetron (ZOFRAN) injection 4 mg (4 mg Intravenous Given 7/14/18 1911)   morphine (PF) 10 mg/mL injection 6 mg (6 mg Intravenous Given 7/14/18 1911)   iohexol (OMNIPAQUE) 350 MG/ML injection (MULTI-DOSE) 100 mL (100 mL Intravenous Given 7/14/18 1944)   morphine (PF) 10 mg/mL injection 6 mg (6 mg Intravenous Given 7/14/18 2039)   heparin (porcine) injection 6,800 Units (6,800 Units Intravenous Given 7/14/18 2056)   sodium chloride 0 9 % infusion (75 mL/hr Intravenous New Bag 7/14/18 2226)       Diagnostic Studies  Results Reviewed     Procedure Component Value Units Date/Time    Procalcitonin [[id number]] Collected:  07/14/18 2235    Lab Status:   In process Specimen:  Blood from Arm, Left Updated:  07/14/18 2240    Urine Microscopic [[id number]]  (Abnormal) Collected:  07/14/18 2139    Lab Status:  Final result Specimen:  Urine from Urine, Clean Catch Updated:  07/14/18 2158     RBC, UA 0-1 (A) /hpf      WBC, UA None Seen /hpf      Epithelial Cells None Seen /hpf      Bacteria, UA Occasional /hpf     UA w Reflex to Microscopic w Reflex to Culture [[id number]]  (Abnormal) Collected:  07/14/18 2139    Lab Status:  Final result Specimen:  Urine from Urine, Clean Catch Updated:  07/14/18 2148     Color, UA Yellow     Clarity, UA Clear     Specific Gravity, UA 1 010     pH, UA 5 5     Leukocytes, UA Negative     Nitrite, UA Negative     Protein, UA Negative mg/dl      Glucose, UA Negative mg/dl      Ketones, UA Negative mg/dl      Urobilinogen, UA 0 2 E U /dl      Bilirubin, UA Negative     Blood, UA Small (A)    APTT [[id number]]     Lab Status:  No result Specimen:  Blood     Protime-INR [[id number]]  (Normal) Collected:  07/14/18 1906    Lab Status:  Final result Specimen:  Blood from Central Venous Line Updated:  07/14/18 1947     Protime 13 1 seconds      INR 1 02    APTT [[id number]]  (Abnormal) Collected:  07/14/18 1906    Lab Status:  Final result Specimen:  Blood from Central Venous Line Updated:  07/14/18 1947     PTT 45 (H) seconds     Basic metabolic panel [[id number]]  (Abnormal) Collected:  07/14/18 1906    Lab Status:  Final result Specimen:  Blood from Central Venous Line Updated:  07/14/18 1946     Sodium 131 (L) mmol/L      Potassium 3 9 mmol/L      Chloride 97 (L) mmol/L      CO2 29 mmol/L      Anion Gap 5 mmol/L      BUN 13 mg/dL      Creatinine 1 33 (H) mg/dL      Glucose 119 mg/dL      Calcium 8 6 mg/dL      eGFR 59 ml/min/1 73sq m     Narrative:         National Kidney Disease Education Program recommendations are as follows:  GFR calculation is accurate only with a steady state creatinine  Chronic Kidney disease less than 60 ml/min/1 73 sq  meters  Kidney failure less than 15 ml/min/1 73 sq  meters      Hepatic function panel [[id number]]  (Abnormal) Collected:  07/14/18 1906    Lab Status:  Final result Specimen:  Blood from Central Venous Line Updated:  07/14/18 1946     Total Bilirubin 0 60 mg/dL      Bilirubin, Direct 0 20 mg/dL      Alkaline Phosphatase 92 U/L      AST 49 (H) U/L      ALT 35 U/L      Total Protein 6 7 g/dL      Albumin 3 1 (L) g/dL     Ammonia [[id number]]  (Abnormal) Collected:  07/14/18 1906    Lab Status:  Final result Specimen:  Blood from Central Venous Line Updated:  07/14/18 1939     Ammonia <10 (L) umol/L     Blood culture #1 [[id number]] Collected:  07/14/18 1929    Lab Status: In process Specimen:  Blood from Arm, Right Updated:  07/14/18 1934    Lactic acid, plasma [[id number]]  (Normal) Collected:  07/14/18 1906    Lab Status:  Final result Specimen:  Blood from Central Venous Line Updated:  07/14/18 1933     LACTIC ACID 0 9 mmol/L     Narrative:         Result may be elevated if tourniquet was used during collection  CBC and differential [[id number]]  (Abnormal) Collected:  07/14/18 1906    Lab Status:  Final result Specimen:  Blood from Central Venous Line Updated:  07/14/18 1926     WBC 9 72 Thousand/uL      RBC 3 97 Million/uL      Hemoglobin 10 9 (L) g/dL      Hematocrit 33 7 (L) %      MCV 85 fL      MCH 27 5 pg      MCHC 32 3 g/dL      RDW 13 7 %      MPV 9 6 fL      Platelets 505 Thousands/uL      Neutrophils Relative 78 (H) %      Lymphocytes Relative 8 (L) %      Monocytes Relative 14 (H) %      Eosinophils Relative 0 %      Basophils Relative 0 %      Neutrophils Absolute 7 55 Thousands/µL      Lymphocytes Absolute 0 80 Thousands/µL      Monocytes Absolute 1 33 (H) Thousand/µL      Eosinophils Absolute 0 03 Thousand/µL      Basophils Absolute 0 01 Thousands/µL     Blood culture #2 [[id number]] Collected:  07/14/18 1921    Lab Status:   In process Specimen:  Blood from Arm, Left Updated:  07/14/18 1923    POCT Chem 8+ [[id number]]  (Abnormal) Collected:  07/14/18 1915    Lab Status:  Final result Updated:  07/14/18 1920     SODIUM, I-STAT 133 (L) mmol/l      Potassium, i-STAT 4 0 mmol/L      Chloride, istat 92 (L) mmol/L      CO2, i-STAT 28 mmol/L      Anion Gap, Istat 17 (H) mmol/L Calcium, Ionized i-STAT 1 20 mmol/L      BUN, I-STAT 13 mg/dl      Creatinine, i-STAT 1 2 mg/dl      eGFR 67 ml/min/1 73sq m      Glucose, i-STAT 118 mg/dl      Hct, i-STAT 36 (L) %      Hgb, i-STAT 12 2 g/dl      Specimen Type VENOUS                 CTA chest ct abdomen pelvis w contrast   Final Result by Yovany Swift MD (07/14 2018)      Acute right lower lobe lobar and segmental pulmonary emboli  Measured RV/LV ratio is within normal limits at less than 0 9  Consolidation in the right lower lobe peripherally suspicious for infarct with groundglass in the right lower lobe suggesting ischemia  Hiatal hernia  I personally discussed this study with Fátima Sullivan on 7/14/2018 at 8:11 PM                      Workstation performed: [id number]                    Procedures  ECG 12 Lead Documentation  Date/Time: 7/14/2018 10:52 PM  Performed by: Evangelina Soares by: Sarah Rosales     ECG reviewed by me, the ED Provider: yes    Patient location:  ED  Previous ECG:     Previous ECG:  Compared to current    Comparison ECG info:  10 23 2015  Interpretation:     Interpretation: non-specific    Rate:     ECG rate:  81  Rhythm:     Rhythm: sinus rhythm    Ectopy:     Ectopy: none    QRS:     QRS axis:  Normal    QRS intervals:  Normal  Conduction:     Conduction: normal    ST segments:     ST segments:  Non-specific  T waves:     T waves: non-specific             Phone Contacts  ED Phone Contact    ED Course  ED Course as of Jul 14 2253   Sat Jul 14, 2018 2021 Patient found to have pulmonary infarction  , discussed case with patient's Dr  at [facility], Dr eGne Dempsey [phone])    he is agreeable to start patient on heparin drip admit to hospital is requesting patient be discharged on Xarelto for ultimate treatment                                MDM  Number of Diagnoses or Management Options  Other acute pulmonary embolism without acute cor pulmonale ([facility] [address] ): new and requires workup  Pulmonary infarction [facility]): new and requires workup     Amount and/or Complexity of Data Reviewed  Clinical lab tests: ordered and reviewed  Tests in the radiology section of CPT®: ordered and reviewed  Decide to obtain previous medical records or to obtain history from someone other than the patient: yes  Obtain history from someone other than the patient: yes  Review and summarize past medical records: yes  Discuss the patient with other providers: yes  Independent visualization of images, tracings, or specimens: yes      CritCare Time    Disposition  Final diagnoses:   Other acute pulmonary embolism without acute cor pulmonale ([facility] )   Pulmonary infarction ([facility] )     Time reflects when diagnosis was documented in both MDM as applicable and the Disposition within this note     Time User Action Codes Description Comment    7/14/2018  8:22 PM Arlette Chi Add [I26 99] Other acute pulmonary embolism without acute cor pulmonale ([facility] )     7/14/2018  8:22 PM Arlette Chi Add [I26 99] Pulmonary infarction ([facility] )     7/14/2018  9:20 PM Candy Paola E Add [I26 99] Acute pulmonary embolism ([facility] )     7/14/2018  9:20 PM Candy Paola E Modify [I26 99] Acute pulmonary embolism [facility])       ED Disposition     ED Disposition Condition Comment    Admit  Case was discussed with PATRIZIA LOZADA [address] [place] [address] and the patient's admission status was agreed to be Admission Status: inpatient status to the service of Dr Nola Michelle   Follow-up Information    None         Current Discharge Medication List      CONTINUE these medications which have NOT CHANGED    Details   acamprosate (CAMPRAL) 333 mg tablet Take 666 mg by mouth 3 (three) times a day      !! buPROPion (WELLBUTRIN) 100 mg tablet Take 300 mg by mouth daily      !!  buPROPion (WELLBUTRIN) 100 mg tablet Take 150 mg by mouth daily at bedtime      cyanocobalamin (VITAMIN B-12) 1,000 mcg tablet Take 1,000 mcg by mouth daily      fentaNYL (DURAGESIC) 75 mcg/hr Place 1 patch on the skin every third day      gabapentin (NEURONTIN) 300 mg capsule Take 300 mg by mouth 2 (two) times a day      levocetirizine (XYZAL) 5 MG tablet Take 10 mg by mouth every morning      LORazepam (ATIVAN) 1 mg tablet Take 1 mg by mouth every 8 (eight) hours as needed for anxiety      milk thistle 175 MG tablet Take 175 mg by mouth 2 (two) times a day      montelukast (SINGULAIR) 10 mg tablet Take 10 mg by mouth daily at bedtime      OLANZapine (ZyPREXA) 10 mg tablet Take 10 mg by mouth daily at bedtime      oxyCODONE (OXY-IR) 5 MG capsule Take 5 mg by mouth every 4 (four) hours as needed for moderate pain      pantoprazole (PROTONIX) 40 mg tablet Take 40 mg by mouth 2 (two) times a day      traZODone (DESYREL) 150 mg tablet Take 150 mg by mouth daily at bedtime       !! - Potential duplicate medications found  Please discuss with provider  No discharge procedures on file      ED Provider  Electronically Signed by           Liliane Wong DO  07/14/18 4867

## 2018-07-15 NOTE — CONSULTS
Pulmonary Consultation   Maribell Ayala  62 y o  male MRN: [id number]  Unit/Bed#: -01 Encounter: [id number]      Reason for consultation:  Pulmonary embolism        Impressions/Recommendations:     · Check serologies for coagulopathy     · Will likely require indefinite treatment with Xarelto (which he had taken for a thrombosis of the liver in the past)      · Increase activity as tolerated      History of Present Illness   HPI:  Maribell Ayala  is a 62 y o  male who was diagnosed with "liver cancer" (cholangiocarcinoma) several years ago and underwent chemotherapy  At that time he was found to have thrombosis of the liver and required anticoagulation with Xarelto for several months  He also has a prior history of 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  He received his care at [facility] in [address] [place] [address]  The patient has no other history of thrombosis /hypercoagulation  The patient developed new chest pain in the right lower posterior region and went to the emergency department last week at a local hospital near [place]  The patient had a CT angiogram of the chest, which was reportedly negative  After driving [place] to visit his daughter, his chest pain worsened and he presented to the emergency department at this hospital   CT scan of the chest demonstrates a moderate right lower lobe pulmonary embolism with evidence of atelectasis and some pleural scarring  The duration of the PE is unclear based on those findings  I reviewed his CT scan of the chest, which also demonstrates a hiatal hernia  His liver appeared unremarkable  Review of systems:  Patient denies headache or vision changes  Weight is stable  Denies sore throat or runny nose  Denies fever, chills or sweats  Denies chest pain or palpitations  Denies nausea, vomiting or diarrhea  Denies lower extremity edema  Denies skin rashes      Denies dysuria or hematuria  All other 12-point review of systems are negative  Historical Information   Past Medical History:   Diagnosis Date    Cholangiocarcinoma ([address] )     SHELLIE (obstructive sleep apnea)     Peptic ulcer     Zollinger-Waddell syndrome      Past Surgical History:   Procedure Laterality Date    LIVER RESECTION      STOMACH SURGERY       History reviewed  No pertinent family history          Family history:  Noncontributory    Meds/Allergies   Current Facility-Administered Medications   Medication Dose Route Frequency    acamprosate (CAMPRAL) DR tablet 666 mg  666 mg Oral TID    buPROPion (WELLBUTRIN SR) 12 hr tablet 150 mg  150 mg Oral HS    buPROPion (WELLBUTRIN SR) 12 hr tablet 300 mg  300 mg Oral Daily    cyanocobalamin (VITAMIN B-12) tablet 1,000 mcg  1,000 mcg Oral Daily    [START ON 7/16/2018] fentaNYL (DURAGESIC) 50 mcg/hr TD 72 hr patch 50 mcg  50 mcg Transdermal Q72H    gabapentin (NEURONTIN) capsule 300 mg  300 mg Oral BID    heparin (porcine) 25,000 units in 250 mL infusion (premix)  3-30 Units/kg/hr (Order-Specific) Intravenous Titrated    heparin (porcine) injection 3,400 Units  3,400 Units Intravenous PRN    heparin (porcine) injection 6,800 Units  6,800 Units Intravenous PRN    lidocaine (LIDODERM) 5 % patch 1 patch  1 patch Transdermal Daily    loratadine (CLARITIN) tablet 10 mg  10 mg Oral Daily    LORazepam (ATIVAN) tablet 1 mg  1 mg Oral Q8H PRN    montelukast (SINGULAIR) tablet 10 mg  10 mg Oral HS    morphine injection 2 mg  2 mg Intravenous Q4H PRN    OLANZapine (ZyPREXA) tablet 10 mg  10 mg Oral HS    ondansetron (ZOFRAN) injection 4 mg  4 mg Intravenous Q6H PRN    oxyCODONE (ROXICODONE) IR tablet 5 mg  5 mg Oral Q6H PRN    pantoprazole (PROTONIX) EC tablet 40 mg  40 mg Oral BID AC    traZODone (DESYREL) tablet 150 mg  150 mg Oral HS     Allergies   Allergen Reactions    Dilaudid [Hydromorphone Hcl] Other (See Comments)     Patient develops agitation and confusion  Vitals: Blood pressure 111/55, pulse 72, temperature 98 9 °F (37 2 °C), temperature source Oral, resp  rate 18, height 6' (1 829 m), weight 89 5 kg (197 lb 5 oz), SpO2 95 %  ,  Body mass index is 26 76 kg/m²      Intake/Output Summary (Last 24 hours) at 07/15/18 1141  Last data filed at 07/14/18 2300   Gross per 24 hour   Intake                0 ml   Output                0 ml   Net                0 ml       Physical exam:    General Appearance:    Sleepy (on narcotics for chronic right upper quadrant pain), cooperative, no conversational dyspnea or   accessory muscle use       Head/eyes:    Normocephalic, without obvious abnormality, atraumatic,       extraocular muscles intact, no scleral icterus                Lungs:     Bibasilar crackles right greater than left   Chest Wall:    No tenderness or deformity    Heart:    Regular rate and rhythm, S1 and S2 normal, no murmur, rub   or gallop       Extremities:   Extremities normal, atraumatic, no cyanosis or edema   Skin:   Warm, moist, presacral edema, no rashes or lesions   Lymph nodes:   Cervical and supraclavicular nodes normal   Neurologic:   CNII-XII intact, normal strength, non-focal           Results from last 7 days  Lab Units 07/15/18  0518 07/14/18  1915 07/14/18  1906   WBC Thousand/uL 6 62  --  9 72   HEMOGLOBIN g/dL 9 4*  --  10 9*   I STAT HEMOGLOBIN g/dl  --  12 2  --    HEMATOCRIT % 29 7*  --  33 7*   PLATELETS Thousands/uL 200  --  214           Results from last 7 days  Lab Units 07/15/18  0518 07/14/18  1915 07/14/18  1906   SODIUM mmol/L 133*  --  131*   POTASSIUM mmol/L 3 8  --  3 9   CHLORIDE mmol/L 98*  --  97*   CO2 mmol/L 28  --  29   BUN mg/dL 11  --  13   CREATININE mg/dL 1 02  --  1 33*   CALCIUM mg/dL 8 3  --  8 6   TOTAL PROTEIN g/dL  --   --  6 7   BILIRUBIN TOTAL mg/dL  --   --  0 60   ALK PHOS U/L  --   --  92   ALT U/L  --   --  35   AST U/L  --   --  49*   GLUCOSE RANDOM mg/dL 102  --  119   GLUCOSE, ISTAT mg/dl  -- 118  --        Results from last 7 days  Lab Units 07/15/18  1111 07/15/18  0307 07/14/18  1906   INR   --   --  1 02   PTT seconds 65* 177* 45*               Imaging and other studies: I have personally reviewed pertinent films in PACS as above          Code Status: Level 1 - Full Code    Thank you for allowing us to participate in the care of your patient      William John MD

## 2018-07-15 NOTE — CASE MANAGEMENT
Initial Clinical Review    Admission: Date/Time/Statement: 7/14/18 @ 2023     Orders Placed This Encounter   Procedures    Inpatient Admission (expected length of stay for this patient is greater than two midnights)     Standing Status:   Standing     Number of Occurrences:   1     Order Specific Question:   Admitting Physician     Answer:   Madelin Lesches [[id number]]     Order Specific Question:   Level of Care     Answer:   Med Surg [16]     Order Specific Question:   Estimated length of stay     Answer:   More than 2 Midnights     Order Specific Question:   Certification     Answer:   I certify that inpatient services are medically necessary for this patient for a duration of greater than two midnights  See H&P and MD Progress Notes for additional information about the patient's course of treatment  ED: Date/Time/Mode of Arrival:   ED Arrival Information     Expected Arrival Acuity Means of Arrival Escorted By Service Admission Type    - 7/14/2018 18:23 Urgent Walk-In Family Member General Medicine Urgent    Arrival Complaint    abdominal pain          Chief Complaint:   Chief Complaint   Patient presents with    Abdominal Pain     pt reports "severe abd pain" starting friday night  Reports R lateral across epigastric  reprots nausea, no vomiting  Pt lives in [place], is under care for [facility] BRUCE VILLAFANA   [facility], was told to come as close to them as possible  Pt has hx liver resection  History of Illness: Orion Varela  is a 62 y o  male  presents with right sided chest pain  Patient reports right sided lower chest pain for the past 2 days  Pain has reportedly been constant, sharp and worsening since onset  He also notes increased SOB with exertion as well as intermittent dizziness  He reports having a cough the past few days and states that earlier today he began having hemoptysis, reportedly coughed up three dime size clots of blood   Patient also states that he has had a burning pain on the right side of his neck since onset of his chest pain  He reports that he was seen in an emergency department near [facility]  where they reside yesterday and was discharged home  CTA chest obtained in the ED this evening revealed an acute right lower lobe lobar and segmental pulmonary emboli and consolidation in the right lower lobe peripherally suspicious for infarct with groundglass in the right lower lobe suggesting ischemia  Patient has been starting on a heparin drip   patient was noted to be have a temp of 100 4  He reports urinary urgency and difficulty urinating today and also notes that his urine has been darker than usual  Patient's daughter at bedside reports noticing that the patient has been more confused today as well, for example patient was frequently asking where they were going on their way to the hospital today  ED Vital Signs:   ED Triage Vitals   Temperature Pulse Respirations Blood Pressure SpO2   07/14/18 1841 07/14/18 1840 07/14/18 1840 07/14/18 1840 07/14/18 1840   100 4 °F (38 °C) 100 18 132/58 95 % Ra sat      Temp Source Heart Rate Source Patient Position - Orthostatic VS BP Location FiO2 (%)   07/14/18 1841 07/14/18 1840 07/14/18 1840 07/14/18 1840 --   Oral Monitor Lying Right arm       Pain Score       07/14/18 1841       Worst Possible Pain        Wt Readings from Last 1 Encounters:   07/14/18 89 5 kg (197 lb 5 oz)       Vital Signs (abnormal):  T max 100 4 -  Placed on 2 L NC o2 for comfort  Sat to 97%     Abnormal Labs/Diagnostic Test Results:   CTA chest - Acute right lower lobe lobar and segmental pulmonary emboli  Measured RV/LV ratio is within normal limits at less than 0 9     Consolidation in the right lower lobe peripherally suspicious for infarct with groundglass in the right lower lobe suggesting ischemia  Hiatal hernia      Labs - Na 131 - Cl 97 - Bun/cr 13/1 33 - Albumin 3 1 - Troponin 0 02-0 02-0 02 - H/H 10 9/33 7- Pt/inr 13 1/1 02  Blood Cx -   Urinalysis - Blood- Small     ED Treatment: Medication Administration from 07/14/2018 1822 to 07/14/2018 2153       Date/Time Order Dose Route Action     07/14/2018 1905 sodium chloride 0 9 % bolus 1,000 mL 1,000 mL Intravenous New Bag     07/14/2018 1911 ondansetron (ZOFRAN) injection 4 mg 4 mg Intravenous Given     07/14/2018 1911 morphine (PF) 10 mg/mL injection 6 mg 6 mg Intravenous Given     07/14/2018 1944 iohexol (OMNIPAQUE) 350 MG/ML injection (MULTI-DOSE) 100 mL 100 mL Intravenous Given     07/14/2018 2039 morphine (PF) 10 mg/mL injection 6 mg 6 mg Intravenous Given     07/14/2018 2056 heparin (porcine) injection 6,800 Units 6,800 Units Intravenous Given     07/14/2018 2057 heparin (porcine) 25,000 units in 250 mL infusion (premix) 18 Units/kg/hr Intravenous New Bag       Past Medical/Surgical History:   Diagnosis    [facility] ([facility] [address] )    SHELLIE (obstructive sleep apnea)    Peptic ulcer    Zollinger-Waddell syndrome     Procedure    LIVER RESECTION    STOMACH SURGERY         Admitting Diagnosis: Pulmonary infarction ([facility] ) [I26 99]  Abdominal pain [R10 9]  Acute pulmonary embolism ([facility] [address] ) [I26 99]  Other acute pulmonary embolism without acute cor pulmonale ([facility] [address] ) [I26 99]    Age/Sex: 62 y o  male    Assessment/Plan:   Acute pulmonary embolism (HCC)   Assessment & Plan     · Presented with right sided chest pain and SOB with exertion  · CTA chest: Acute right lower lobe lobar and segmental pulmonary emboli  Measured RV/LV ratio is within normal limits at less than 0 9  Consolidation in the right lower lobe peripherally suspicious for infarct with groundglass in the right lower lobe suggesting ischemia  · Obtain echocardiogram   · Continue IV heparin  · Per discussion with ED physician who spoke with patient's oncologist at [facility], patient should be transitioned to [address] [place] [address] on discharge  · Patient with reported hemoptysis today, possibly due to PE  Pulmonology consulted    · Pain control with lidocaine patch, prn oxycodone and prn morphine in addition to home medication, Fentanyl patch  · Pulse ox stable on 2L nasal cannula  ? Continue to monitor  Ambulatory pulse ox prior to d/c            Acute encephalopathy   Assessment & Plan     · Patient noted by family to have increased confusion today  Also febrile on presentation  · Possible infectious etiology  Admits to urinary complaints including urgency and difficulty urinating  Obtain urinalysis  ? Obtain procalcitonin, TSH, B12 and folate level      · Ammonia level < 10   · Monitor neuro checks  · Consideration for CT head if confusion worsens or if work-up negative for infection            Fever   Assessment & Plan     · Temp of 100 4 on presentation  Also tachycardic on presentation and noted by family to have increased confusion today  ? Continue to monitor vital signs and neuro checks  § No leukocytosis, lactic acid WNL  · Check urinalysis/ urine culture  · Follow-up on blood cultures  · Check procalcitonin            Hyponatremia   Assessment & Plan     · Na 131  · Received 1 L bolus of IV fluids in the ED  · Obtain repeat BMP in AM            Cholangiocarcinoma [facility])   Assessment & Plan     · S/p liver resection, chemotherapy and radiation therapy in 2016  · Follows with oncology at [facility]   Not currently receiving therapy, on observation with CT scans every 3 months                 Admission Orders:  Scheduled Meds:   Current Facility-Administered Medications:  acamprosate 666 mg Oral TID    buPROPion 150 mg Oral HS    buPROPion 300 mg Oral Daily    cyanocobalamin 1,000 mcg Oral Daily    [START ON 7/16/2018] fentaNYL 50 mcg Transdermal Q72H    gabapentin 300 mg Oral BID    heparin (porcine) 3,400 Units Intravenous PRN    heparin (porcine) 6,800 Units Intravenous PRN    lidocaine 1 patch Transdermal Daily    loratadine 10 mg Oral Daily    LORazepam 1 mg Oral Q8H PRN    montelukast 10 mg Oral HS    morphine injection 2 mg Intravenous Q4H PRN    OLANZapine 10 mg Oral HS    ondansetron 4 mg Intravenous Q6H PRN    oxyCODONE 5 mg Oral Q6H PRN    pantoprazole 40 mg Oral BID AC    traZODone 150 mg Oral HS      Continuous Infusions:   heparin (porcine) 3-30 Units/kg/hr (Order-Specific)     Nursing orders - VS q 4 - Neuro checks q 4 - Telem - Chesk Pulse ox on RA  Resting & ambulating - Up with assistance - Diet regular house - CPAP hs -     Pulmonary consult -          Check serologies for coagulopathy          Will likely require indefinite treatment with Xarelto (which he had taken for a thrombosis of the liver in the past)           Increase activity as tolerated

## 2018-07-15 NOTE — ASSESSMENT & PLAN NOTE
· Patient noted by family to have increased confusion today  Also febrile on presentation  · Possible infectious etiology  Admits to urinary complaints including urgency and difficulty urinating  Obtain urinalysis  · Obtain procalcitonin, TSH, B12 and folate level      · Ammonia level < 10   · Monitor neuro checks  · Consideration for CT head if confusion worsens or if work-up negative for infection

## 2018-07-15 NOTE — H&P
H&P- Lorie Malin  1960, 62 y o  male MRN: [id number]    Unit/Bed#: -01 Encounter: [id number]    Primary Care Provider: Sylvie Herron MD   Date and time admitted to hospital: 7/14/2018  6:32 PM    Addendum at 2214: Urine shows 0-1 RBC, no WBC, occasional bacteria  Doubt UTI  Of note, Cr 1 33 on admission  No recent labs available for comparison  Suspect ALMAS  Will give gentle IV fluids overnight  Confusion/ encephalopathy possibly metabolic  Continue to monitor  * Acute pulmonary embolism ([facility] [address] )   Assessment & Plan    · Presented with right sided chest pain and SOB with exertion  · CTA chest: Acute right lower lobe lobar and segmental pulmonary emboli  Measured RV/LV ratio is within normal limits at less than 0 9  Consolidation in the right lower lobe peripherally suspicious for infarct with groundglass in the right lower lobe suggesting ischemia  · Obtain echocardiogram   · Continue IV heparin  · Per discussion with ED physician who spoke with patient's oncologist at [facility], patient should be transitioned to [address] [place] [address] on discharge  · Patient with reported hemoptysis today, possibly due to PE  Pulmonology consulted  · Pain control with lidocaine patch, prn oxycodone and prn morphine in addition to home medication, Fentanyl patch  · Pulse ox stable on 2L nasal cannula  · Continue to monitor  Ambulatory pulse ox prior to d/c  Acute encephalopathy   Assessment & Plan    · Patient noted by family to have increased confusion today  Also febrile on presentation  · Possible infectious etiology  Admits to urinary complaints including urgency and difficulty urinating  Obtain urinalysis  · Obtain procalcitonin, TSH, B12 and folate level      · Ammonia level < 10   · Monitor neuro checks  · Consideration for CT head if confusion worsens or if work-up negative for infection  Fever   Assessment & Plan    · Temp of 100 4 on presentation   Also tachycardic on presentation and noted by family to have increased confusion today  · Continue to monitor vital signs and neuro checks  · No leukocytosis, lactic acid WNL  · CTA chest: Consolidation in the right lower lobe peripherally suspicious for infarct with groundglass in the right lower lobe suggesting ischemia  · Check urinalysis/ urine culture  · Follow-up on blood cultures  · Check procalcitonin  Hyponatremia   Assessment & Plan    · Na 131  · Received 1 L bolus of IV fluids in the ED  · Obtain repeat BMP in AM          Cholangiocarcinoma [facility])   Assessment & Plan    · S/p liver resection, chemotherapy and radiation therapy in 2016  · Follows with oncology at [facility]  Not currently receiving therapy, on observation with CT scans every 3 months  VTE Prophylaxis: Heparin Drip  / sequential compression device   Code Status: Level 1- Full Code  POLST: POLST form is not discussed and not completed at this time  Discussion with family: patient's wife and daughter at bedside    Anticipated Length of Stay:  Patient will be admitted on an Inpatient basis with an anticipated length of stay of  > 2 midnights  Justification for Hospital Stay: PE, need for IV heparin and pulmonology evaluation  Total Time for Visit, including Counseling / Coordination of Care: 45 minutes  Greater than 50% of this total time spent on direct patient counseling and coordination of care  Chief Complaint:   Right sided chest pain  History of Present Illness:    Jt Law  is a 62 y o  male with a history of cholangiocarcinoma who presents with right sided chest pain  Patient reports right sided lower chest pain for the past 2 days  Pain has reportedly been constant, sharp and worsening since onset  He also notes increased SOB with exertion as well as intermittent dizziness   He reports having a cough the past few days and states that earlier today he began having hemoptysis, reportedly coughed up three dime size clots of blood  Patient also states that he has had a burning pain on the right side of his neck since onset of his chest pain  He reports that [facility] and was discharged home  CTA chest obtained in the ED this evening revealed an acute right lower lobe lobar and segmental pulmonary emboli and consolidation in the right lower lobe peripherally suspicious for infarct with groundglass in the right lower lobe suggesting ischemia  Patient has been starting on a heparin drip  On presentation to the hospital this evening, patient was noted to be have a temp of 100 4  He reports urinary urgency and difficulty urinating today and also notes that his urine has been darker than usual  Patient's daughter at bedside reports noticing that the patient has been more confused today as well, for example patient was frequently asking where they were going on their way to the hospital today  Patient also notes that his abdomen seems to be firmer than usual and admits to a history of bowel obstructions  He denies nausea/vomiting or diarrhea  He also denies recent change in appetite  Patient has a history of cholangiocarcinoma for which he underwent liver resection, chemotherapy and radiation therapy, which were completed in 2016  Prior to his liver resection he had a portal vein thrombus and was on Xarelto for a period of time up until just after his surgery, chemotherapy  He denies any prior history of DVT or PE  Review of Systems:    Review of Systems   Constitutional: Positive for appetite change and fever  Respiratory: Positive for cough and shortness of breath  Cardiovascular: Positive for chest pain  Genitourinary: Positive for difficulty urinating and urgency  Neurological: Positive for dizziness  All other systems reviewed and are negative        Past Medical and Surgical History:     Past Medical History:   Diagnosis Date    [facility] [address] )     Peptic ulcer     Zollinger-Waddell syndrome        Past Surgical History:   Procedure Laterality Date    LIVER RESECTION      STOMACH SURGERY         Meds/Allergies:    Prior to Admission medications    Medication Sig Start Date End Date Taking? Authorizing Provider   acamprosate (CAMPRAL) 333 mg tablet Take 666 mg by mouth 3 (three) times a day   Yes Historical Provider, MD   buPROPion (WELLBUTRIN) 100 mg tablet Take 300 mg by mouth daily   Yes Historical Provider, MD   buPROPion (WELLBUTRIN) 100 mg tablet Take 150 mg by mouth daily at bedtime   Yes Historical Provider, MD   cyanocobalamin (VITAMIN B-12) 1,000 mcg tablet Take 1,000 mcg by mouth daily   Yes Historical Provider, MD   fentaNYL (DURAGESIC) 75 mcg/hr Place 1 patch on the skin every third day   Yes Historical Provider, MD   gabapentin (NEURONTIN) 300 mg capsule Take 300 mg by mouth 2 (two) times a day   Yes Historical Provider, MD   levocetirizine (XYZAL) 5 MG tablet Take 10 mg by mouth every morning   Yes Historical Provider, MD   LORazepam (ATIVAN) 1 mg tablet Take 1 mg by mouth every 8 (eight) hours as needed for anxiety   Yes Historical Provider, MD   milk thistle 175 MG tablet Take 175 mg by mouth 2 (two) times a day   Yes Historical Provider, MD   montelukast (SINGULAIR) 10 mg tablet Take 10 mg by mouth daily at bedtime   Yes Historical Provider, MD   OLANZapine (ZyPREXA) 10 mg tablet Take 10 mg by mouth daily at bedtime   Yes Historical Provider, MD   oxyCODONE (OXY-IR) 5 MG capsule Take 5 mg by mouth every 4 (four) hours as needed for moderate pain   Yes Historical Provider, MD   pantoprazole (PROTONIX) 40 mg tablet Take 40 mg by mouth 2 (two) times a day   Yes Historical Provider, MD   traZODone (DESYREL) 150 mg tablet Take 150 mg by mouth daily at bedtime   Yes Historical Provider, MD         Allergies:    Allergies   Allergen Reactions    Dilaudid [Hydromorphone Hcl] Other (See Comments)     Patient develops agitation and confusion  Social History:     Marital Status: /Civil Union   Patient Pre-hospital Living Situation: Resides at home with family  Patient Pre-hospital Level of Mobility: ambulates without assistance  Patient Pre-hospital Diet Restrictions: none  Substance Use History:   History   Alcohol use Not on file     History   Smoking Status    Not on file   Smokeless Tobacco    Not on file     History   Drug use: Unknown       Family History:    History reviewed  No pertinent family history  Physical Exam:     Vitals:   Blood Pressure: 121/58 (07/14/18 2159)  Pulse: 86 (07/14/18 2159)  Temperature: 99 5 °F (37 5 °C) (07/14/18 2159)  Temp Source: Oral (07/14/18 2159)  Respirations: 18 (07/14/18 2159)  Height: 6' (182 9 cm) (07/14/18 2159)  Weight - Scale: 89 5 kg (197 lb 5 oz) (07/14/18 2159)  SpO2: 96 % (07/14/18 2159)    Physical Exam   Constitutional: He is oriented to person, place, and time  He appears well-developed and well-nourished  No distress  HENT:   Head: Normocephalic and atraumatic  Eyes: Conjunctivae are normal    Cardiovascular: Normal rate, regular rhythm and normal heart sounds  Pulmonary/Chest: Effort normal and breath sounds normal  No respiratory distress  He exhibits tenderness (right lower chest wall)  Nasal cannula in place   Abdominal: Soft  Bowel sounds are normal  He exhibits no distension  There is no tenderness  Musculoskeletal: Normal range of motion  He exhibits no edema  Neurological: He is alert and oriented to person, place, and time  Skin: Skin is warm and dry  He is not diaphoretic  No erythema  There is pallor  Psychiatric: He has a normal mood and affect  His behavior is normal    Nursing note and vitals reviewed  Additional Data:     Lab Results: I have personally reviewed pertinent reports          Results from last 7 days  Lab Units 07/14/18 1915 07/14/18  1906   WBC Thousand/uL  --  9 72   HEMOGLOBIN g/dL  --  10 9*   I STAT HEMOGLOBIN g/dl 12 2  --    HEMATOCRIT %  --  33 7*   PLATELETS Thousands/uL  --  214   NEUTROS PCT %  --  78*   LYMPHS PCT %  --  8*   MONOS PCT %  --  14*   EOS PCT %  --  0       Results from last 7 days  Lab Units 07/14/18  1915 07/14/18  1906   SODIUM mmol/L  --  131*   POTASSIUM mmol/L  --  3 9   CHLORIDE mmol/L  --  97*   CO2 mmol/L  --  29   BUN mg/dL  --  13   CREATININE mg/dL  --  1 33*   CALCIUM mg/dL  --  8 6   TOTAL PROTEIN g/dL  --  6 7   BILIRUBIN TOTAL mg/dL  --  0 60   ALK PHOS U/L  --  92   ALT U/L  --  35   AST U/L  --  49*   GLUCOSE RANDOM mg/dL  --  119   GLUCOSE, ISTAT mg/dl 118  --        Results from last 7 days  Lab Units 07/14/18  1906   INR  1 02               Imaging: I have personally reviewed pertinent reports  CTA chest ct abdomen pelvis w contrast   Final Result by Yary Bishop MD (07/14 2018)      Acute right lower lobe lobar and segmental pulmonary emboli  Measured RV/LV ratio is within normal limits at less than 0 9  Consolidation in the right lower lobe peripherally suspicious for infarct with groundglass in the right lower lobe suggesting ischemia  Hiatal hernia  I personally discussed this study with Darlene Smith on 7/14/2018 at 8:11 PM                      Workstation performed: [id number]               Allscripts / Epic Records Reviewed: Yes     ** Please Note: This note has been constructed using a voice recognition system   **

## 2018-07-16 ENCOUNTER — APPOINTMENT (INPATIENT)
Dept: NON INVASIVE DIAGNOSTICS | Facility: HOSPITAL | Age: 58
DRG: 175 | End: 2018-07-16
Payer: MEDICARE

## 2018-07-16 LAB
APTT PPP: 51 SECONDS (ref 24–36)
APTT PPP: 99 SECONDS (ref 24–36)
APTT PPP: 99 SECONDS (ref 24–36)
PROT C AG ACT/NOR PPP IA: 74 % OF NORMAL (ref 60–150)

## 2018-07-16 PROCEDURE — 94760 N-INVAS EAR/PLS OXIMETRY 1: CPT

## 2018-07-16 PROCEDURE — 85730 THROMBOPLASTIN TIME PARTIAL: CPT | Performed by: INTERNAL MEDICINE

## 2018-07-16 PROCEDURE — 93306 TTE W/DOPPLER COMPLETE: CPT | Performed by: INTERNAL MEDICINE

## 2018-07-16 PROCEDURE — 94660 CPAP INITIATION&MGMT: CPT

## 2018-07-16 PROCEDURE — 93306 TTE W/DOPPLER COMPLETE: CPT

## 2018-07-16 PROCEDURE — 99232 SBSQ HOSP IP/OBS MODERATE 35: CPT | Performed by: INTERNAL MEDICINE

## 2018-07-16 RX ORDER — MAGNESIUM CARB/ALUMINUM HYDROX 105-160MG
296 TABLET,CHEWABLE ORAL ONCE
Status: COMPLETED | OUTPATIENT
Start: 2018-07-16 | End: 2018-07-16

## 2018-07-16 RX ORDER — AMOXICILLIN 250 MG
2 CAPSULE ORAL 2 TIMES DAILY
Status: DISCONTINUED | OUTPATIENT
Start: 2018-07-17 | End: 2018-07-17 | Stop reason: HOSPADM

## 2018-07-16 RX ORDER — POLYETHYLENE GLYCOL 3350 17 G/17G
17 POWDER, FOR SOLUTION ORAL DAILY PRN
Status: DISCONTINUED | OUTPATIENT
Start: 2018-07-16 | End: 2018-07-17 | Stop reason: HOSPADM

## 2018-07-16 RX ADMIN — LIDOCAINE 1 PATCH: 50 PATCH CUTANEOUS at 21:47

## 2018-07-16 RX ADMIN — DRONABINOL 10 MG: 2.5 CAPSULE ORAL at 09:07

## 2018-07-16 RX ADMIN — OXYCODONE HYDROCHLORIDE 10 MG: 10 TABLET ORAL at 17:59

## 2018-07-16 RX ADMIN — PANTOPRAZOLE SODIUM 40 MG: 40 TABLET, DELAYED RELEASE ORAL at 18:01

## 2018-07-16 RX ADMIN — LORAZEPAM 1 MG: 1 TABLET ORAL at 09:02

## 2018-07-16 RX ADMIN — GABAPENTIN 300 MG: 300 CAPSULE ORAL at 09:01

## 2018-07-16 RX ADMIN — MONTELUKAST SODIUM 10 MG: 10 TABLET, FILM COATED ORAL at 09:01

## 2018-07-16 RX ADMIN — DRONABINOL 10 MG: 2.5 CAPSULE ORAL at 17:46

## 2018-07-16 RX ADMIN — PANTOPRAZOLE SODIUM 40 MG: 40 TABLET, DELAYED RELEASE ORAL at 09:01

## 2018-07-16 RX ADMIN — BUPROPION HYDROCHLORIDE 300 MG: 150 TABLET, FILM COATED, EXTENDED RELEASE ORAL at 09:00

## 2018-07-16 RX ADMIN — CYANOCOBALAMIN TAB 500 MCG 1000 MCG: 500 TAB at 09:01

## 2018-07-16 RX ADMIN — OXYCODONE HYDROCHLORIDE 10 MG: 10 TABLET ORAL at 06:09

## 2018-07-16 RX ADMIN — OXYCODONE HYDROCHLORIDE 10 MG: 10 TABLET ORAL at 12:12

## 2018-07-16 RX ADMIN — FENTANYL 50 MCG: 50 PATCH TRANSDERMAL at 00:27

## 2018-07-16 RX ADMIN — DRONABINOL 10 MG: 2.5 CAPSULE ORAL at 12:13

## 2018-07-16 RX ADMIN — TRAZODONE HYDROCHLORIDE 150 MG: 50 TABLET, FILM COATED ORAL at 21:46

## 2018-07-16 RX ADMIN — BUPROPION HYDROCHLORIDE 150 MG: 150 TABLET, FILM COATED, EXTENDED RELEASE ORAL at 21:47

## 2018-07-16 RX ADMIN — ACAMPROSATE CALCIUM 666 MG: 333 TABLET, DELAYED RELEASE ORAL at 21:46

## 2018-07-16 RX ADMIN — LORATADINE 10 MG: 10 TABLET ORAL at 09:01

## 2018-07-16 RX ADMIN — LORAZEPAM 1 MG: 1 TABLET ORAL at 20:07

## 2018-07-16 RX ADMIN — MAGESIUM CITRATE 296 ML: 1.75 LIQUID ORAL at 20:11

## 2018-07-16 RX ADMIN — OLANZAPINE 10 MG: 10 TABLET, FILM COATED ORAL at 21:47

## 2018-07-16 RX ADMIN — GABAPENTIN 300 MG: 300 CAPSULE ORAL at 17:58

## 2018-07-16 RX ADMIN — MORPHINE SULFATE 2 MG: 2 INJECTION, SOLUTION INTRAMUSCULAR; INTRAVENOUS at 21:46

## 2018-07-16 RX ADMIN — RIVAROXABAN 15 MG: 15 TABLET, FILM COATED ORAL at 20:11

## 2018-07-16 NOTE — PROGRESS NOTES
Pt sleeping in bed with cpap machine on  Wife bedside  Pt will call if pain develops  New fetanyl patch place on R abdomen  NSR on tele, no events  VSS  Bed low and locked  Call bell within reach  Will continue to monitor

## 2018-07-16 NOTE — SOCIAL WORK
Xarelto scripts sent to [facility] for copay price check and fill  CM dept will follow

## 2018-07-16 NOTE — PROGRESS NOTES
[facility] Progress Note  Patient: Mulugeta Reaves  62 y o  male   MRN: [id number]  PCP: Jennifer Jameson MD  Unit/Bed#: -01 Encounter: [id number]  Date Of Visit: 07/16/18    Assessment:    Principal Problem:    Acute pulmonary embolism ([facility] )  Active Problems:    Cholangiocarcinoma ([facility] )    Hyponatremia    Fever    Acute encephalopathy    SHELLIE (obstructive sleep apnea)      Plan:    · Acute Pulmonary Embolism with Pulmonary Infarction -   · Anticoagulation - Heparin drip will be stopped and change to xarelto tonight  Patient has Xarelto initial 21 days in the room now but we still need to make sure that the 20 mg daily will be affordable after the free trial period is completed  · Hypercoagulability panel - Follow up Protein C, Protein S, cardiolipin antibody, Factor V Leiden ordered and pending  · Oxygenation / Ventilation - 95-96% on room air  · Pain Control -   · Oxycodone and morphine available  Will increase the oxycodone to 10 mg dose  · Monitor Pain levels  · Anemia - This may be chronic from as far back as 2015  Continue to monitor counts and if dropping below 9, additional workup may be needed  CBC in am   · Acute Encephalopathy POA -   · Per wife, this is better today  Continue to monitor  · Negative Testing - NH3, TSH, Urinalysis  · Additional Testing -   · Medication Review - Wellbutrin, Campral, Gabapentin, Zyprexa, loratidine, Trazodone, oxycodone, and ativan are all potential contributors  Currently also morphine is an additional cause potentially  · Hyponatremia - Improved with IV fluids  Continue to monitor  · Abdominal Pain with Distention - Obstruction series shows constipation with nonobstructive bowel gas pattern  Continue pain meds  Give dose of mag citrate here now  Give Miralax PRN and daily senna / docusate  · History of Cholangiocarcinoma - patient is s/p liver resection, chemotherapy, and radiation in 2016    Follows with Severiano Escobar and is not currently on any active therapy  Surveillance with CT scans every 3 months  VTE Pharmacologic Prophylaxis:   Pharmacologic: Heparin Drip  Mechanical VTE Prophylaxis in Place: Yes    Patient Centered Rounds: I have performed bedside rounds with nursing staff today  Discussions with Specialists or Other Care Team Provider: None    Education and Discussions with Family / Patient: updated wife at bedside  Time Spent for Care: 30 minutes  More than 50% of total time spent on counseling and coordination of care as described above  Current Length of Stay: 2 day(s)    Current Patient Status: Inpatient   Certification Statement: The patient will continue to require additional inpatient hospital stay due to evaluate patient for pulmonary embolism  Discharge Plan / Estimated Discharge Date: Next 24 hours? Code Status: Level 1 - Full Code      Subjective: The patient still is having 8/10 pain in the abdomen  Xray yesterday shows feces / constipation with nonobstructed bowel gas pattern  The patient is having some firm BMs but abdomen still distended  This pain is new last few days  The patient has no bleeding or hemoptysis today  Wife is concerned about potential discharge when I discuss possibility with them due to him having pain with a 5 hour drive ahead of them to get home  Objective:     Vitals:   Temp (24hrs), Av 2 °F (37 3 °C), Min:98 9 °F (37 2 °C), Max:99 3 °F (37 4 °C)    HR:  [76-87] 87  Resp:  [16-18] 16  BP: (105-116)/(57-60) 115/59  SpO2:  [94 %-95 %] 94 %  Body mass index is 26 76 kg/m²  Input and Output Summary (last 24 hours): Intake/Output Summary (Last 24 hours) at 18 1844  Last data filed at 18 1225   Gross per 24 hour   Intake              600 ml   Output                0 ml   Net              600 ml       Physical Exam:     Physical Exam   Constitutional: He is oriented to person, place, and time     HENT:   Mouth/Throat: Oropharynx is clear and moist  No oropharyngeal exudate  Eyes: Conjunctivae are normal  Pupils are equal, round, and reactive to light  Cardiovascular: Normal rate and regular rhythm  Pulmonary/Chest: Effort normal  He has no wheezes  He has no rales  Abdominal: He exhibits distension  There is no tenderness  Slightly firm  Hyperresonance to percussion  Bowel sounds less active today but still normoactive  Neurological: He is alert and oriented to person, place, and time  No cranial nerve deficit  Skin: Skin is warm and dry  Vitals reviewed  Additional Data:     Labs:      Results from last 7 days  Lab Units 07/15/18  0518  07/14/18  1906   WBC Thousand/uL 6 62  --  9 72   HEMOGLOBIN g/dL 9 4*  --  10 9*   I STAT HEMOGLOBIN   --   < >  --    HEMATOCRIT % 29 7*  --  33 7*   PLATELETS Thousands/uL 200  --  214   NEUTROS PCT %  --   --  78*   LYMPHS PCT %  --   --  8*   MONOS PCT %  --   --  14*   EOS PCT %  --   --  0   < > = values in this interval not displayed  Results from last 7 days  Lab Units 07/15/18  0518  07/14/18  1906   SODIUM mmol/L 133*  --  131*   POTASSIUM mmol/L 3 8  --  3 9   CHLORIDE mmol/L 98*  --  97*   CO2 mmol/L 28  --  29   BUN mg/dL 11  --  13   CREATININE mg/dL 1 02  --  1 33*   CALCIUM mg/dL 8 3  --  8 6   TOTAL PROTEIN g/dL  --   --  6 7   BILIRUBIN TOTAL mg/dL  --   --  0 60   ALK PHOS U/L  --   --  92   ALT U/L  --   --  35   AST U/L  --   --  49*   GLUCOSE RANDOM mg/dL 102  --  119   GLUCOSE, ISTAT   --   < >  --    < > = values in this interval not displayed  Results from last 7 days  Lab Units 07/14/18  1906   INR  1 02       * I Have Reviewed All Lab Data Listed Above  * Additional Pertinent Lab Tests Reviewed:  [facility] Admission Reviewed    Imaging:    Imaging Reports Reviewed Today Include: CT Chest  Imaging Personally Reviewed by Myself Includes:  None    Recent Cultures (last 7 days):       Results from last 7 days  Lab Units 07/14/18 1929 07/14/18 1921   BLOOD CULTURE  No Growth at 24 hrs  No Growth at 24 hrs  Last 24 Hours Medication List:     Current Facility-Administered Medications:  acamprosate 666 mg Oral TID Valjean Petit, PA-C    buPROPion 150 mg Oral HS Valjean Petit, PA-C    buPROPion 300 mg Oral Daily Valjean Petit, PA-C    cyanocobalamin 1,000 mcg Oral Daily Valjean Petit, PA-C    dronabinol 10 mg Oral TID With Meals Selvin Grimes MD    fentaNYL 50 mcg Transdermal Q72H Valjean Petit, PA-C    gabapentin 300 mg Oral BID Valjean Petit, PA-C    heparin (porcine) 3-30 Units/kg/hr (Order-Specific) Intravenous Titrated Alex Sharma DO Last Rate: Stopped (07/16/18 1843)   heparin (porcine) 3,400 Units Intravenous PRN Alex Sharma DO    heparin (porcine) 6,800 Units Intravenous PRN Alex Sharma DO    lidocaine 1 patch Transdermal Daily Valjean Petit, PA-C    loratadine 10 mg Oral Daily Yara Kelley, PA-C    LORazepam 1 mg Oral Q8H PRN Valjean Petit, PA-C    montelukast 10 mg Oral HS Valjean Petit, PA-C    morphine injection 2 mg Intravenous Q4H PRN Valjean Petit, PA-C    OLANZapine 10 mg Oral HS Yara Kelley, PA-C    ondansetron 4 mg Intravenous Q6H PRN Valjean Petit, PA-C    oxyCODONE 10 mg Oral Q6H PRN Esdras Christian DO    pantoprazole 40 mg Oral BID AC Valjean Petit, PA-C    traZODone 150 mg Oral HS Valjean Petit, PA-C         Today, Patient Was Seen By: Esdras Christian DO    ** Please Note: This note has been constructed using a voice recognition system   **

## 2018-07-17 VITALS
RESPIRATION RATE: 17 BRPM | SYSTOLIC BLOOD PRESSURE: 120 MMHG | WEIGHT: 197.31 LBS | BODY MASS INDEX: 26.73 KG/M2 | DIASTOLIC BLOOD PRESSURE: 72 MMHG | OXYGEN SATURATION: 95 % | TEMPERATURE: 99.3 F | HEIGHT: 72 IN | HEART RATE: 96 BPM

## 2018-07-17 LAB
ALBUMIN SERPL BCP-MCNC: 2.6 G/DL (ref 3.5–5)
ALP SERPL-CCNC: 94 U/L (ref 46–116)
ALT SERPL W P-5'-P-CCNC: 26 U/L (ref 12–78)
ANION GAP SERPL CALCULATED.3IONS-SCNC: 5 MMOL/L (ref 4–13)
AST SERPL W P-5'-P-CCNC: 19 U/L (ref 5–45)
ATRIAL RATE: 81 BPM
BILIRUB SERPL-MCNC: 0.3 MG/DL (ref 0.2–1)
BUN SERPL-MCNC: 12 MG/DL (ref 5–25)
CALCIUM SERPL-MCNC: 8.9 MG/DL (ref 8.3–10.1)
CARDIOLIPIN IGA SER IA-ACNC: <9 APL U/ML (ref 0–11)
CARDIOLIPIN IGG SER IA-ACNC: <9 GPL U/ML (ref 0–14)
CARDIOLIPIN IGM SER IA-ACNC: 11 MPL U/ML (ref 0–12)
CHLORIDE SERPL-SCNC: 96 MMOL/L (ref 100–108)
CO2 SERPL-SCNC: 31 MMOL/L (ref 21–32)
CREAT SERPL-MCNC: 0.85 MG/DL (ref 0.6–1.3)
ERYTHROCYTE [DISTWIDTH] IN BLOOD BY AUTOMATED COUNT: 13.4 % (ref 11.6–15.1)
GFR SERPL CREATININE-BSD FRML MDRD: 97 ML/MIN/1.73SQ M
GLUCOSE SERPL-MCNC: 103 MG/DL (ref 65–140)
HCT VFR BLD AUTO: 30.3 % (ref 36.5–49.3)
HGB BLD-MCNC: 9.7 G/DL (ref 12–17)
MCH RBC QN AUTO: 27.1 PG (ref 26.8–34.3)
MCHC RBC AUTO-ENTMCNC: 32 G/DL (ref 31.4–37.4)
MCV RBC AUTO: 85 FL (ref 82–98)
P AXIS: 61 DEGREES
PLATELET # BLD AUTO: 254 THOUSANDS/UL (ref 149–390)
PMV BLD AUTO: 9 FL (ref 8.9–12.7)
POTASSIUM SERPL-SCNC: 4.1 MMOL/L (ref 3.5–5.3)
PR INTERVAL: 164 MS
PROT SERPL-MCNC: 6.5 G/DL (ref 6.4–8.2)
QRS AXIS: 0 DEGREES
QRSD INTERVAL: 90 MS
QT INTERVAL: 348 MS
QTC INTERVAL: 404 MS
RBC # BLD AUTO: 3.58 MILLION/UL (ref 3.88–5.62)
SODIUM SERPL-SCNC: 132 MMOL/L (ref 136–145)
T WAVE AXIS: 63 DEGREES
VENTRICULAR RATE: 81 BPM
WBC # BLD AUTO: 5.37 THOUSAND/UL (ref 4.31–10.16)

## 2018-07-17 PROCEDURE — 99239 HOSP IP/OBS DSCHRG MGMT >30: CPT | Performed by: INTERNAL MEDICINE

## 2018-07-17 PROCEDURE — 80053 COMPREHEN METABOLIC PANEL: CPT | Performed by: INTERNAL MEDICINE

## 2018-07-17 PROCEDURE — 93010 ELECTROCARDIOGRAM REPORT: CPT | Performed by: INTERNAL MEDICINE

## 2018-07-17 PROCEDURE — 85027 COMPLETE CBC AUTOMATED: CPT | Performed by: INTERNAL MEDICINE

## 2018-07-17 PROCEDURE — 94660 CPAP INITIATION&MGMT: CPT

## 2018-07-17 RX ORDER — LIDOCAINE 50 MG/G
1 PATCH TOPICAL DAILY
Qty: 30 PATCH | Refills: 0 | Status: SHIPPED | OUTPATIENT
Start: 2018-07-17 | End: 2020-11-08 | Stop reason: HOSPADM

## 2018-07-17 RX ORDER — OXYCODONE HYDROCHLORIDE 10 MG/1
10 TABLET ORAL EVERY 6 HOURS PRN
Qty: 20 TABLET | Refills: 0 | Status: SHIPPED | OUTPATIENT
Start: 2018-07-17 | End: 2018-07-22

## 2018-07-17 RX ORDER — AMOXICILLIN 250 MG
2 CAPSULE ORAL 2 TIMES DAILY
Qty: 30 TABLET | Refills: 0 | Status: SHIPPED | OUTPATIENT
Start: 2018-07-17 | End: 2020-11-08 | Stop reason: HOSPADM

## 2018-07-17 RX ADMIN — LORAZEPAM 1 MG: 1 TABLET ORAL at 08:12

## 2018-07-17 RX ADMIN — HEPARIN 300 UNITS: 100 SYRINGE at 14:44

## 2018-07-17 RX ADMIN — RIVAROXABAN 15 MG: 15 TABLET, FILM COATED ORAL at 08:12

## 2018-07-17 RX ADMIN — CYANOCOBALAMIN TAB 500 MCG 1000 MCG: 500 TAB at 08:12

## 2018-07-17 RX ADMIN — DRONABINOL 10 MG: 2.5 CAPSULE ORAL at 08:11

## 2018-07-17 RX ADMIN — OXYCODONE HYDROCHLORIDE 10 MG: 10 TABLET ORAL at 11:18

## 2018-07-17 RX ADMIN — PANTOPRAZOLE SODIUM 40 MG: 40 TABLET, DELAYED RELEASE ORAL at 08:12

## 2018-07-17 RX ADMIN — GABAPENTIN 300 MG: 300 CAPSULE ORAL at 08:12

## 2018-07-17 RX ADMIN — DRONABINOL 10 MG: 2.5 CAPSULE ORAL at 12:17

## 2018-07-17 RX ADMIN — MONTELUKAST SODIUM 10 MG: 10 TABLET, FILM COATED ORAL at 08:12

## 2018-07-17 RX ADMIN — Medication 2 TABLET: at 08:11

## 2018-07-17 RX ADMIN — LORATADINE 10 MG: 10 TABLET ORAL at 08:12

## 2018-07-17 RX ADMIN — BUPROPION HYDROCHLORIDE 300 MG: 150 TABLET, FILM COATED, EXTENDED RELEASE ORAL at 08:12

## 2018-07-17 RX ADMIN — OXYCODONE HYDROCHLORIDE 10 MG: 10 TABLET ORAL at 05:12

## 2018-07-17 RX ADMIN — POLYETHYLENE GLYCOL 3350 17 G: 17 POWDER, FOR SOLUTION ORAL at 01:24

## 2018-07-17 NOTE — DISCHARGE SUMMARY
Discharge- Omkar Thompson  1960, 62 y o  male MRN: [id number]    Unit/Bed#: -01 Encounter: [id number]    Primary Care Provider: Chilo Jensen MD   Date and time admitted to hospital: 7/14/2018  6:32 PM        Hyponatremia   Assessment & Plan    [address]  Relatively stable  Will need outpatient BMP and to follow up with PCP in [place]   Cholangiocarcinoma [facility])   Assessment & Plan    S/p liver resection, chemotherapy and radiation therapy in 2016  Follows with oncology at [facility]  Not currently receiving therapy, on observation with CT scans every 3 months  CT scan on this admission, did not show any recurrence  [place] Beat[place] [facility] [place] him of hospital course and also he is in agreement steve Xarelto  He will schedule follow up with regard to cancer and PE  * Acute pulmonary embolism ([facility] )   Assessment & Plan    DC home on Xarelto  Discussed with CM, patient had medications delivered to room  Discussed with wife and this is an affordable option at this time  Patient has some pain in RUQ, "under ribs" worse on inspiration - explained to patient and wife that this is 2/2 PE and can go on for several weeks   Wife and patient happy with discharge plan  Discharging Physician / Practitioner: Amy Martins MD  PCP: Chilo Jensen MD  Admission Date:   Admission Orders     Ordered        07/14/18 2023  Inpatient Admission (expected length of stay for this patient is greater than two midnights)  Once             Discharge Date: 07/17/18    Resolved Problems  Date Reviewed: 7/17/2018    None          Consultations During Hospital Stay:  · Pulmonology - Dr Judd Li  Procedures Performed:     · CT abdomen pelvis with contrast -   "Acute right lower lobe lobar and segmental pulmonary emboli      Measured RV/LV ratio is within normal limits at less than 0 9       Consolidation in the right lower lobe peripherally suspicious for infarct with groundglass in the right lower lobe suggesting ischemia  Hiatal hernia "    Significant Findings / Test Results:     · As above  Incidental Findings:   ·  none   Test Results Pending at Discharge (will require follow up): · Hypercoagulable work up pending - informed primary oncology team at [facility]  Outpatient Tests Requested:  · None   Complications:  None   Reason for Admission:   Right sided chest pain  Hospital Course:     Lupe Velazco  is a 62 y o  male patient who originally presented to the hospital on 7/14/2018 due to right sided chest pain  He has known history of cholangiocarcinoma s/p liver resection, and travels back and forth between [place] and [facility] as he follows with medical oncology over at [facility] in [place], and stays with his daughter whenever he has follow up appointments to go to  He was seen by pulmonology who suggested hypercoagulable panel and Protein C activity was normal, protein S and factor VI were pending at the time of discharge  Echo did not show evidence of right heart strain  Patient was started on Xarelto and give scripts for this  At the time of discharge patient was complaining of RUQ pain "under the ribs", worse on inspiration  This pain is likely 2/2 PE and patient was discharged home with oxycodone and incentive spirometry  Please see above list of diagnoses and related plan for additional information  Condition at Discharge: stable     Discharge Day Visit / Exam:     Subjective:      Patient seen and examined     Had "large normal" bowel movement today  No CP or SOB  Complaining of "rib pain"   Which is worse on inspiration  No other complaints       Vitals: Blood Pressure: 108/60 (07/17/18 0700)  Pulse: 77 (07/17/18 0700)  Temperature: 99 °F (37 2 °C) (07/17/18 0700)  Temp Source: Oral (07/17/18 0700)  Respirations: 16 (07/17/18 0700)  Height: 6' (182 9 cm) (07/14/18 9439)  Weight - Scale: 89 5 kg (197 lb 5 oz) (07/14/18 2159)  SpO2: 95 % (07/17/18 0700)  Exam:   Physical Exam   Constitutional: He is oriented to person, place, and time  He appears well-developed  No distress  HENT:   Head: Normocephalic  Mouth/Throat: No oropharyngeal exudate  Eyes: Right eye exhibits no discharge  Left eye exhibits no discharge  No scleral icterus  Neck: No JVD present  No tracheal deviation present  No thyromegaly present  Cardiovascular: Normal rate  Exam reveals no gallop and no friction rub  No murmur heard  Pulmonary/Chest: Effort normal  No respiratory distress  He has no wheezes  He has no rales  He exhibits no tenderness  Abdominal: Soft  He exhibits no distension and no mass  There is no tenderness  There is no rebound and no guarding  Musculoskeletal: Normal range of motion  He exhibits no edema, tenderness or deformity  Lymphadenopathy:     He has no cervical adenopathy  Neurological: He is alert and oriented to person, place, and time  No cranial nerve deficit  Coordination normal    Skin: Skin is warm  No rash noted  He is not diaphoretic  No erythema  No pallor  Psychiatric: He has a normal mood and affect  Discussion with Family: Discussed at length with wife - all her questions were answered  She wanted me to call Oncologist Dr Amber Covington at [facility] - I did call him and updated him  Discharge instructions/Information to patient and family:   See after visit summary for information provided to patient and family  Provisions for Follow-Up Care:  See after visit summary for information related to follow-up care and any pertinent home health orders  Disposition:     Home    For Discharges to [facility] SNF:   · Not Applicable to this Patient - Not Applicable to this Patient    Planned Readmission: none  Discharge Statement:  I spent 45 minutes discharging the patient  This time was spent on the day of discharge   I had direct contact with the patient on the day of discharge  Greater than 50% of the total time was spent examining patient, answering all patient questions, arranging and discussing plan of care with patient as well as directly providing post-discharge instructions  Additional time then spent on discharge activities  Discharge Medications:  See after visit summary for reconciled discharge medications provided to patient and family        ** Please Note: This note has been constructed using a voice recognition system **

## 2018-07-17 NOTE — ASSESSMENT & PLAN NOTE
DC home on Xarelto  Discussed with CM, patient had medications delivered to room  Discussed with wife and this is an affordable option at this time  Patient has some pain in RUQ, "under ribs" worse on inspiration - explained to patient and wife that this is 2/2 PE and can go on for several weeks   Wife and patient happy with discharge plan

## 2018-07-17 NOTE — ASSESSMENT & PLAN NOTE
S/p liver resection, chemotherapy and radiation therapy in 2016  Follows with oncology at [facility]  Not currently receiving therapy, on observation with CT scans every 3 months  CT scan on this admission, did not show any recurrence  I did discuss with Dr David Velasco at [facility] - updated him of hospital course and also he is in agreement steve Rosana    He will schedule follow up with regard to cancer and PE

## 2018-07-17 NOTE — DISCHARGE INSTRUCTIONS
Please have some repeat lab work done in 1 weeks times, including a BMP  Call your PCP to schedule this and follow up with them regarding results  Use the incentive spirometry as instructed  Follow up with your oncologist at [facility] regarding your cancer, but also regarding the blood clot and follow up of your coagulation studies

## 2018-07-18 NOTE — CASE MANAGEMENT
Notification of Discharge  This is a Notification of Discharge from our facility [address]  Please be advised that this patient has been discharge from our facility  Below you will find the admission and discharge date and time including the patients disposition  PRESENTATION DATE: 7/14/2018  6:32 PM  IP ADMISSION DATE: 7/14/18 2023  DISCHARGE DATE: 7/17/2018  4:09 PM  DISPOSITION: Home/Self Care    [address] [facility] in the [facility] Utilization Review Department  Phone: [phone]; Fax [phone]  ATTENTION: The Network Utilization Review Department is now centralized for our 9 Facilities  Make a note that we have a new phone and fax numbers for our Department  Please call with any questions or concerns to [phone] and carefully follow the prompts so that you are directed to the right person  All voicemails are confidential  Fax any determinations, approvals, denials, and requests for initial or continue stay review clinical to [phone]  Due to HIGH CALL volume, it would be easier if you could please send faxed requests to expedite your requests and in part, help us provide discharge notifications faster    Reference #[id number]

## 2018-07-19 LAB — PROT S ACT/NOR PPP: 47 % (ref 63–140)

## 2018-07-20 LAB
BACTERIA BLD CULT: NORMAL
BACTERIA BLD CULT: NORMAL
F5 GENE MUT ANL BLD/T: NORMAL

## 2020-10-29 ENCOUNTER — APPOINTMENT (EMERGENCY)
Dept: CT IMAGING | Facility: HOSPITAL | Age: 60
DRG: 374 | End: 2020-10-29
Payer: MEDICARE

## 2020-10-29 ENCOUNTER — HOSPITAL ENCOUNTER (INPATIENT)
Facility: HOSPITAL | Age: 60
LOS: 10 days | Discharge: HOME HEALTH | DRG: 374 | End: 2020-11-08
Attending: EMERGENCY MEDICINE | Admitting: INTERNAL MEDICINE
Payer: MEDICARE

## 2020-10-29 DIAGNOSIS — R14.0 ABDOMINAL DISTENSION: ICD-10-CM

## 2020-10-29 DIAGNOSIS — F41.9 ANXIETY: ICD-10-CM

## 2020-10-29 DIAGNOSIS — K83.1 BILIARY OBSTRUCTION: ICD-10-CM

## 2020-10-29 DIAGNOSIS — J45.909 ASTHMA: ICD-10-CM

## 2020-10-29 DIAGNOSIS — R79.89 ELEVATED LIVER FUNCTION TESTS: ICD-10-CM

## 2020-10-29 DIAGNOSIS — G47.33 OSA (OBSTRUCTIVE SLEEP APNEA): ICD-10-CM

## 2020-10-29 DIAGNOSIS — C22.1 CHOLANGIOCARCINOMA (HCC): ICD-10-CM

## 2020-10-29 DIAGNOSIS — N17.9 AKI (ACUTE KIDNEY INJURY) (HCC): ICD-10-CM

## 2020-10-29 DIAGNOSIS — K31.1 GASTRIC OUT LET OBSTRUCTION: ICD-10-CM

## 2020-10-29 DIAGNOSIS — K92.2 GASTROINTESTINAL HEMORRHAGE, UNSPECIFIED GASTROINTESTINAL HEMORRHAGE TYPE: ICD-10-CM

## 2020-10-29 DIAGNOSIS — B95.2 ENTEROCOCCUS FAECALIS INFECTION: ICD-10-CM

## 2020-10-29 DIAGNOSIS — R78.81 GRAM-POSITIVE BACTEREMIA: ICD-10-CM

## 2020-10-29 DIAGNOSIS — K31.1 GASTRIC OUTLET OBSTRUCTION: Primary | ICD-10-CM

## 2020-10-29 DIAGNOSIS — R33.9 URINARY RETENTION: ICD-10-CM

## 2020-10-29 PROBLEM — Z86.711 HISTORY OF PULMONARY EMBOLISM: Chronic | Status: ACTIVE | Noted: 2018-07-14

## 2020-10-29 LAB
ALBUMIN SERPL BCP-MCNC: 2.8 G/DL (ref 3.5–5)
ALP SERPL-CCNC: 123 U/L (ref 46–116)
ALT SERPL W P-5'-P-CCNC: 17 U/L (ref 12–78)
ANION GAP SERPL CALCULATED.3IONS-SCNC: 12 MMOL/L (ref 4–13)
AST SERPL W P-5'-P-CCNC: 31 U/L (ref 5–45)
ATRIAL RATE: 70 BPM
ATRIAL RATE: 72 BPM
BASOPHILS # BLD AUTO: 0.02 THOUSANDS/ΜL (ref 0–0.1)
BASOPHILS NFR BLD AUTO: 0 % (ref 0–1)
BILIRUB SERPL-MCNC: 0.56 MG/DL (ref 0.2–1)
BUN SERPL-MCNC: 6 MG/DL (ref 5–25)
CALCIUM ALBUM COR SERPL-MCNC: 10 MG/DL (ref 8.3–10.1)
CALCIUM SERPL-MCNC: 9 MG/DL (ref 8.3–10.1)
CHLORIDE SERPL-SCNC: 98 MMOL/L (ref 100–108)
CO2 SERPL-SCNC: 24 MMOL/L (ref 21–32)
CREAT SERPL-MCNC: 0.67 MG/DL (ref 0.6–1.3)
EOSINOPHIL # BLD AUTO: 0.07 THOUSAND/ΜL (ref 0–0.61)
EOSINOPHIL NFR BLD AUTO: 1 % (ref 0–6)
ERYTHROCYTE [DISTWIDTH] IN BLOOD BY AUTOMATED COUNT: 16.2 % (ref 11.6–15.1)
GFR SERPL CREATININE-BSD FRML MDRD: 104 ML/MIN/1.73SQ M
GLUCOSE SERPL-MCNC: 113 MG/DL (ref 65–140)
HCT VFR BLD AUTO: 30.3 % (ref 36.5–49.3)
HCT VFR BLD AUTO: 32.2 % (ref 36.5–49.3)
HGB BLD-MCNC: 10.7 G/DL (ref 12–17)
HGB BLD-MCNC: 9.8 G/DL (ref 12–17)
IMM GRANULOCYTES # BLD AUTO: 0.02 THOUSAND/UL (ref 0–0.2)
IMM GRANULOCYTES NFR BLD AUTO: 0 % (ref 0–2)
LACTATE SERPL-SCNC: 1.3 MMOL/L (ref 0.5–2)
LIPASE SERPL-CCNC: 42 U/L (ref 73–393)
LYMPHOCYTES # BLD AUTO: 0.67 THOUSANDS/ΜL (ref 0.6–4.47)
LYMPHOCYTES NFR BLD AUTO: 11 % (ref 14–44)
MCH RBC QN AUTO: 27 PG (ref 26.8–34.3)
MCHC RBC AUTO-ENTMCNC: 33.2 G/DL (ref 31.4–37.4)
MCV RBC AUTO: 81 FL (ref 82–98)
MONOCYTES # BLD AUTO: 0.52 THOUSAND/ΜL (ref 0.17–1.22)
MONOCYTES NFR BLD AUTO: 8 % (ref 4–12)
NEUTROPHILS # BLD AUTO: 4.86 THOUSANDS/ΜL (ref 1.85–7.62)
NEUTS SEG NFR BLD AUTO: 80 % (ref 43–75)
NRBC BLD AUTO-RTO: 0 /100 WBCS
P AXIS: 74 DEGREES
P AXIS: 83 DEGREES
PLATELET # BLD AUTO: 280 THOUSANDS/UL (ref 149–390)
PMV BLD AUTO: 10.2 FL (ref 8.9–12.7)
POTASSIUM SERPL-SCNC: 3.5 MMOL/L (ref 3.5–5.3)
PR INTERVAL: 184 MS
PR INTERVAL: 184 MS
PROT SERPL-MCNC: 6.4 G/DL (ref 6.4–8.2)
QRS AXIS: -25 DEGREES
QRS AXIS: -37 DEGREES
QRSD INTERVAL: 80 MS
QRSD INTERVAL: 84 MS
QT INTERVAL: 396 MS
QT INTERVAL: 414 MS
QTC INTERVAL: 433 MS
QTC INTERVAL: 447 MS
RBC # BLD AUTO: 3.97 MILLION/UL (ref 3.88–5.62)
SODIUM SERPL-SCNC: 134 MMOL/L (ref 136–145)
T WAVE AXIS: 74 DEGREES
T WAVE AXIS: 90 DEGREES
TROPONIN I SERPL-MCNC: <0.02 NG/ML
VENTRICULAR RATE: 70 BPM
VENTRICULAR RATE: 72 BPM
WBC # BLD AUTO: 6.16 THOUSAND/UL (ref 4.31–10.16)

## 2020-10-29 PROCEDURE — 96375 TX/PRO/DX INJ NEW DRUG ADDON: CPT

## 2020-10-29 PROCEDURE — 99223 1ST HOSP IP/OBS HIGH 75: CPT | Performed by: INTERNAL MEDICINE

## 2020-10-29 PROCEDURE — 96365 THER/PROPH/DIAG IV INF INIT: CPT

## 2020-10-29 PROCEDURE — 90682 RIV4 VACC RECOMBINANT DNA IM: CPT | Performed by: PHYSICIAN ASSISTANT

## 2020-10-29 PROCEDURE — 80053 COMPREHEN METABOLIC PANEL: CPT | Performed by: EMERGENCY MEDICINE

## 2020-10-29 PROCEDURE — C9113 INJ PANTOPRAZOLE SODIUM, VIA: HCPCS | Performed by: PHYSICIAN ASSISTANT

## 2020-10-29 PROCEDURE — 84484 ASSAY OF TROPONIN QUANT: CPT | Performed by: EMERGENCY MEDICINE

## 2020-10-29 PROCEDURE — 85027 COMPLETE CBC AUTOMATED: CPT | Performed by: PHYSICIAN ASSISTANT

## 2020-10-29 PROCEDURE — 36415 COLL VENOUS BLD VENIPUNCTURE: CPT | Performed by: EMERGENCY MEDICINE

## 2020-10-29 PROCEDURE — 85018 HEMOGLOBIN: CPT | Performed by: PHYSICIAN ASSISTANT

## 2020-10-29 PROCEDURE — 99291 CRITICAL CARE FIRST HOUR: CPT | Performed by: EMERGENCY MEDICINE

## 2020-10-29 PROCEDURE — 83605 ASSAY OF LACTIC ACID: CPT | Performed by: EMERGENCY MEDICINE

## 2020-10-29 PROCEDURE — 96376 TX/PRO/DX INJ SAME DRUG ADON: CPT

## 2020-10-29 PROCEDURE — 93005 ELECTROCARDIOGRAM TRACING: CPT

## 2020-10-29 PROCEDURE — 85014 HEMATOCRIT: CPT | Performed by: PHYSICIAN ASSISTANT

## 2020-10-29 PROCEDURE — 0DJ08ZZ INSPECTION OF UPPER INTESTINAL TRACT, VIA NATURAL OR ARTIFICIAL OPENING ENDOSCOPIC: ICD-10-PCS | Performed by: INTERNAL MEDICINE

## 2020-10-29 PROCEDURE — 90471 IMMUNIZATION ADMIN: CPT | Performed by: PHYSICIAN ASSISTANT

## 2020-10-29 PROCEDURE — 83690 ASSAY OF LIPASE: CPT | Performed by: EMERGENCY MEDICINE

## 2020-10-29 PROCEDURE — 99285 EMERGENCY DEPT VISIT HI MDM: CPT

## 2020-10-29 PROCEDURE — 93010 ELECTROCARDIOGRAM REPORT: CPT | Performed by: INTERNAL MEDICINE

## 2020-10-29 PROCEDURE — 74177 CT ABD & PELVIS W/CONTRAST: CPT

## 2020-10-29 PROCEDURE — 85025 COMPLETE CBC W/AUTO DIFF WBC: CPT | Performed by: EMERGENCY MEDICINE

## 2020-10-29 RX ORDER — FENTANYL CITRATE 50 UG/ML
100 INJECTION, SOLUTION INTRAMUSCULAR; INTRAVENOUS ONCE
Status: COMPLETED | OUTPATIENT
Start: 2020-10-29 | End: 2020-10-29

## 2020-10-29 RX ORDER — SODIUM CHLORIDE 9 MG/ML
125 INJECTION, SOLUTION INTRAVENOUS CONTINUOUS
Status: DISCONTINUED | OUTPATIENT
Start: 2020-10-29 | End: 2020-10-29

## 2020-10-29 RX ORDER — ACETAMINOPHEN 650 MG/1
650 SUPPOSITORY RECTAL EVERY 4 HOURS PRN
Status: DISCONTINUED | OUTPATIENT
Start: 2020-10-29 | End: 2020-11-01

## 2020-10-29 RX ORDER — HEPARIN SODIUM 1000 [USP'U]/ML
2400 INJECTION, SOLUTION INTRAVENOUS; SUBCUTANEOUS
Status: DISCONTINUED | OUTPATIENT
Start: 2020-10-29 | End: 2020-10-29

## 2020-10-29 RX ORDER — MORPHINE SULFATE 10 MG/ML
10 INJECTION, SOLUTION INTRAMUSCULAR; INTRAVENOUS EVERY 6 HOURS PRN
Status: DISCONTINUED | OUTPATIENT
Start: 2020-10-29 | End: 2020-10-30

## 2020-10-29 RX ORDER — METOCLOPRAMIDE HYDROCHLORIDE 5 MG/ML
5 INJECTION INTRAMUSCULAR; INTRAVENOUS ONCE
Status: COMPLETED | OUTPATIENT
Start: 2020-10-29 | End: 2020-10-29

## 2020-10-29 RX ORDER — MIDAZOLAM HYDROCHLORIDE 2 MG/2ML
1 INJECTION, SOLUTION INTRAMUSCULAR; INTRAVENOUS ONCE
Status: COMPLETED | OUTPATIENT
Start: 2020-10-29 | End: 2020-10-29

## 2020-10-29 RX ORDER — ONDANSETRON 2 MG/ML
4 INJECTION INTRAMUSCULAR; INTRAVENOUS EVERY 6 HOURS PRN
Status: DISCONTINUED | OUTPATIENT
Start: 2020-10-29 | End: 2020-11-08 | Stop reason: HOSPADM

## 2020-10-29 RX ORDER — HEPARIN SODIUM 1000 [USP'U]/ML
4800 INJECTION, SOLUTION INTRAVENOUS; SUBCUTANEOUS
Status: DISCONTINUED | OUTPATIENT
Start: 2020-10-29 | End: 2020-10-29

## 2020-10-29 RX ORDER — HYOSCYAMINE SULFATE 0.12 MG/5ML
125 LIQUID ORAL EVERY 4 HOURS PRN
COMMUNITY

## 2020-10-29 RX ORDER — FENTANYL 100 UG/H
1 PATCH TRANSDERMAL
Status: DISCONTINUED | OUTPATIENT
Start: 2020-10-30 | End: 2020-11-08 | Stop reason: HOSPADM

## 2020-10-29 RX ORDER — DEXTROSE, SODIUM CHLORIDE, SODIUM LACTATE, POTASSIUM CHLORIDE, AND CALCIUM CHLORIDE 5; .6; .31; .03; .02 G/100ML; G/100ML; G/100ML; G/100ML; G/100ML
100 INJECTION, SOLUTION INTRAVENOUS CONTINUOUS
Status: DISCONTINUED | OUTPATIENT
Start: 2020-10-29 | End: 2020-11-03

## 2020-10-29 RX ORDER — FENTANYL 50 UG/H
50 PATCH TRANSDERMAL
Status: DISCONTINUED | OUTPATIENT
Start: 2020-10-30 | End: 2020-11-08 | Stop reason: HOSPADM

## 2020-10-29 RX ORDER — ONDANSETRON 2 MG/ML
4 INJECTION INTRAMUSCULAR; INTRAVENOUS ONCE
Status: COMPLETED | OUTPATIENT
Start: 2020-10-29 | End: 2020-10-29

## 2020-10-29 RX ORDER — HEPARIN SODIUM 10000 [USP'U]/100ML
3-30 INJECTION, SOLUTION INTRAVENOUS
Status: DISCONTINUED | OUTPATIENT
Start: 2020-10-29 | End: 2020-10-29

## 2020-10-29 RX ORDER — LORAZEPAM 2 MG/ML
1 INJECTION INTRAMUSCULAR EVERY 8 HOURS
Status: DISCONTINUED | OUTPATIENT
Start: 2020-10-29 | End: 2020-11-02

## 2020-10-29 RX ADMIN — FENTANYL CITRATE 100 MCG: 50 INJECTION INTRAMUSCULAR; INTRAVENOUS at 11:24

## 2020-10-29 RX ADMIN — MORPHINE SULFATE 10 MG: 10 INJECTION INTRAVENOUS at 17:54

## 2020-10-29 RX ADMIN — SODIUM CHLORIDE, SODIUM LACTATE, POTASSIUM CHLORIDE, AND CALCIUM CHLORIDE 500 ML: .6; .31; .03; .02 INJECTION, SOLUTION INTRAVENOUS at 11:07

## 2020-10-29 RX ADMIN — TOPICAL ANESTHETIC 2 SPRAY: 200 SPRAY DENTAL; PERIODONTAL at 14:19

## 2020-10-29 RX ADMIN — DEXTROSE, SODIUM CHLORIDE, SODIUM LACTATE, POTASSIUM CHLORIDE, AND CALCIUM CHLORIDE 100 ML/HR: 5; .6; .31; .03; .02 INJECTION, SOLUTION INTRAVENOUS at 17:54

## 2020-10-29 RX ADMIN — FENTANYL CITRATE 100 MCG: 50 INJECTION INTRAMUSCULAR; INTRAVENOUS at 16:10

## 2020-10-29 RX ADMIN — FENTANYL CITRATE 100 MCG: 50 INJECTION INTRAMUSCULAR; INTRAVENOUS at 21:43

## 2020-10-29 RX ADMIN — MIDAZOLAM 1 MG: 1 INJECTION INTRAMUSCULAR; INTRAVENOUS at 14:18

## 2020-10-29 RX ADMIN — ONDANSETRON 4 MG: 2 INJECTION INTRAMUSCULAR; INTRAVENOUS at 17:53

## 2020-10-29 RX ADMIN — METOCLOPRAMIDE HYDROCHLORIDE 5 MG: 5 INJECTION INTRAMUSCULAR; INTRAVENOUS at 14:35

## 2020-10-29 RX ADMIN — MORPHINE SULFATE 10 MG: 10 INJECTION INTRAVENOUS at 23:44

## 2020-10-29 RX ADMIN — HEPARIN SODIUM 18 UNITS/KG/HR: 10000 INJECTION, SOLUTION INTRAVENOUS at 17:54

## 2020-10-29 RX ADMIN — FENTANYL CITRATE 100 MCG: 50 INJECTION INTRAMUSCULAR; INTRAVENOUS at 12:01

## 2020-10-29 RX ADMIN — FENTANYL CITRATE 100 MCG: 50 INJECTION INTRAMUSCULAR; INTRAVENOUS at 13:55

## 2020-10-29 RX ADMIN — IOHEXOL 100 ML: 350 INJECTION, SOLUTION INTRAVENOUS at 11:52

## 2020-10-29 RX ADMIN — LORAZEPAM 1 MG: 2 INJECTION INTRAMUSCULAR; INTRAVENOUS at 19:55

## 2020-10-29 RX ADMIN — METOCLOPRAMIDE HYDROCHLORIDE 5 MG: 5 INJECTION INTRAMUSCULAR; INTRAVENOUS at 12:52

## 2020-10-29 RX ADMIN — INFLUENZA A VIRUS A/HAWAII/70/2019 (H1N1) RECOMBINANT HEMAGGLUTININ ANTIGEN, INFLUENZA A VIRUS A/MINNESOTA/41/2019 (H3N2) RECOMBINANT HEMAGGLUTININ ANTIGEN, INFLUENZA B VIRUS B/WASHINGTON/02/2019 RECOMBINANT HEMAGGLUTININ ANTIGEN, AND INFLUENZA B VIRUS B/PHUKET/3073/2013 RECOMBINANT HEMAGGLUTININ ANTIGEN 0.5 ML: 45; 45; 45; 45 INJECTION INTRAMUSCULAR at 17:54

## 2020-10-29 RX ADMIN — FENTANYL CITRATE 100 MCG: 50 INJECTION INTRAMUSCULAR; INTRAVENOUS at 10:40

## 2020-10-29 RX ADMIN — Medication 8 MG/HR: at 23:44

## 2020-10-29 RX ADMIN — ONDANSETRON 4 MG: 2 INJECTION INTRAMUSCULAR; INTRAVENOUS at 10:38

## 2020-10-29 RX ADMIN — SODIUM CHLORIDE 125 ML/HR: 0.9 INJECTION, SOLUTION INTRAVENOUS at 15:24

## 2020-10-29 RX ADMIN — SODIUM CHLORIDE 80 MG: 9 INJECTION, SOLUTION INTRAVENOUS at 21:43

## 2020-10-30 ENCOUNTER — APPOINTMENT (INPATIENT)
Dept: MRI IMAGING | Facility: HOSPITAL | Age: 60
DRG: 374 | End: 2020-10-30
Payer: MEDICARE

## 2020-10-30 PROBLEM — R79.89 ELEVATED LIVER FUNCTION TESTS: Status: ACTIVE | Noted: 2020-10-30

## 2020-10-30 PROBLEM — F11.20 UNCOMPLICATED OPIOID DEPENDENCE (HCC): Status: ACTIVE | Noted: 2020-10-30

## 2020-10-30 LAB
ALBUMIN SERPL BCP-MCNC: 2.5 G/DL (ref 3.5–5)
ALP SERPL-CCNC: 614 U/L (ref 46–116)
ALT SERPL W P-5'-P-CCNC: 152 U/L (ref 12–78)
ANION GAP SERPL CALCULATED.3IONS-SCNC: 8 MMOL/L (ref 4–13)
AST SERPL W P-5'-P-CCNC: 505 U/L (ref 5–45)
BILIRUB SERPL-MCNC: 1.77 MG/DL (ref 0.2–1)
BUN SERPL-MCNC: 6 MG/DL (ref 5–25)
CALCIUM ALBUM COR SERPL-MCNC: 9.8 MG/DL (ref 8.3–10.1)
CALCIUM SERPL-MCNC: 8.6 MG/DL (ref 8.3–10.1)
CHLORIDE SERPL-SCNC: 100 MMOL/L (ref 100–108)
CO2 SERPL-SCNC: 27 MMOL/L (ref 21–32)
CREAT SERPL-MCNC: 0.7 MG/DL (ref 0.6–1.3)
ERYTHROCYTE [DISTWIDTH] IN BLOOD BY AUTOMATED COUNT: 15.8 % (ref 11.6–15.1)
ERYTHROCYTE [DISTWIDTH] IN BLOOD BY AUTOMATED COUNT: 15.9 % (ref 11.6–15.1)
GFR SERPL CREATININE-BSD FRML MDRD: 103 ML/MIN/1.73SQ M
GLUCOSE SERPL-MCNC: 122 MG/DL (ref 65–140)
HCT VFR BLD AUTO: 30.2 % (ref 36.5–49.3)
HCT VFR BLD AUTO: 30.3 % (ref 36.5–49.3)
HCT VFR BLD AUTO: 30.3 % (ref 36.5–49.3)
HGB BLD-MCNC: 10.1 G/DL (ref 12–17)
HGB BLD-MCNC: 9.8 G/DL (ref 12–17)
HGB BLD-MCNC: 9.9 G/DL (ref 12–17)
MCH RBC QN AUTO: 26.3 PG (ref 26.8–34.3)
MCH RBC QN AUTO: 26.5 PG (ref 26.8–34.3)
MCHC RBC AUTO-ENTMCNC: 32.5 G/DL (ref 31.4–37.4)
MCHC RBC AUTO-ENTMCNC: 32.7 G/DL (ref 31.4–37.4)
MCV RBC AUTO: 81 FL (ref 82–98)
MCV RBC AUTO: 82 FL (ref 82–98)
PLATELET # BLD AUTO: 286 THOUSANDS/UL (ref 149–390)
PLATELET # BLD AUTO: 295 THOUSANDS/UL (ref 149–390)
PMV BLD AUTO: 11 FL (ref 8.9–12.7)
PMV BLD AUTO: 9.9 FL (ref 8.9–12.7)
POTASSIUM SERPL-SCNC: 3.7 MMOL/L (ref 3.5–5.3)
PROT SERPL-MCNC: 5.7 G/DL (ref 6.4–8.2)
RBC # BLD AUTO: 3.7 MILLION/UL (ref 3.88–5.62)
RBC # BLD AUTO: 3.76 MILLION/UL (ref 3.88–5.62)
SODIUM SERPL-SCNC: 135 MMOL/L (ref 136–145)
WBC # BLD AUTO: 11.94 THOUSAND/UL (ref 4.31–10.16)
WBC # BLD AUTO: 7.86 THOUSAND/UL (ref 4.31–10.16)

## 2020-10-30 PROCEDURE — 74181 MRI ABDOMEN W/O CONTRAST: CPT

## 2020-10-30 PROCEDURE — 99223 1ST HOSP IP/OBS HIGH 75: CPT | Performed by: PHYSICIAN ASSISTANT

## 2020-10-30 PROCEDURE — 99232 SBSQ HOSP IP/OBS MODERATE 35: CPT | Performed by: PHYSICIAN ASSISTANT

## 2020-10-30 PROCEDURE — 85027 COMPLETE CBC AUTOMATED: CPT | Performed by: PHYSICIAN ASSISTANT

## 2020-10-30 PROCEDURE — G1004 CDSM NDSC: HCPCS

## 2020-10-30 PROCEDURE — 85018 HEMOGLOBIN: CPT | Performed by: PHYSICIAN ASSISTANT

## 2020-10-30 PROCEDURE — C9113 INJ PANTOPRAZOLE SODIUM, VIA: HCPCS | Performed by: PHYSICIAN ASSISTANT

## 2020-10-30 PROCEDURE — 85014 HEMATOCRIT: CPT | Performed by: PHYSICIAN ASSISTANT

## 2020-10-30 PROCEDURE — 76377 3D RENDER W/INTRP POSTPROCES: CPT

## 2020-10-30 PROCEDURE — 80053 COMPREHEN METABOLIC PANEL: CPT | Performed by: PHYSICIAN ASSISTANT

## 2020-10-30 RX ORDER — LORAZEPAM 2 MG/ML
1 INJECTION INTRAMUSCULAR ONCE
Status: COMPLETED | OUTPATIENT
Start: 2020-10-30 | End: 2020-10-30

## 2020-10-30 RX ORDER — MORPHINE SULFATE 10 MG/ML
10 INJECTION, SOLUTION INTRAMUSCULAR; INTRAVENOUS EVERY 6 HOURS PRN
Status: DISCONTINUED | OUTPATIENT
Start: 2020-10-30 | End: 2020-10-31

## 2020-10-30 RX ORDER — MORPHINE SULFATE 10 MG/ML
5 INJECTION, SOLUTION INTRAMUSCULAR; INTRAVENOUS EVERY 4 HOURS PRN
Status: DISCONTINUED | OUTPATIENT
Start: 2020-10-30 | End: 2020-11-03

## 2020-10-30 RX ORDER — HYDROMORPHONE HCL/PF 1 MG/ML
1 SYRINGE (ML) INJECTION EVERY 4 HOURS PRN
Status: DISCONTINUED | OUTPATIENT
Start: 2020-10-30 | End: 2020-10-30

## 2020-10-30 RX ADMIN — MORPHINE SULFATE 10 MG: 10 INJECTION INTRAVENOUS at 16:40

## 2020-10-30 RX ADMIN — MORPHINE SULFATE 10 MG: 10 INJECTION INTRAVENOUS at 05:20

## 2020-10-30 RX ADMIN — LORAZEPAM 1 MG: 2 INJECTION INTRAMUSCULAR; INTRAVENOUS at 11:05

## 2020-10-30 RX ADMIN — FENTANYL 50 MCG: 50 PATCH TRANSDERMAL at 09:26

## 2020-10-30 RX ADMIN — Medication 8 MG/HR: at 20:45

## 2020-10-30 RX ADMIN — ONDANSETRON 4 MG: 2 INJECTION INTRAMUSCULAR; INTRAVENOUS at 01:29

## 2020-10-30 RX ADMIN — FENTANYL TRANSDERMAL 1 PATCH: 100 PATCH, EXTENDED RELEASE TRANSDERMAL at 09:25

## 2020-10-30 RX ADMIN — DEXTROSE, SODIUM CHLORIDE, SODIUM LACTATE, POTASSIUM CHLORIDE, AND CALCIUM CHLORIDE 100 ML/HR: 5; .6; .31; .03; .02 INJECTION, SOLUTION INTRAVENOUS at 05:11

## 2020-10-30 RX ADMIN — Medication 1 SPRAY: at 11:18

## 2020-10-30 RX ADMIN — DEXTROSE, SODIUM CHLORIDE, SODIUM LACTATE, POTASSIUM CHLORIDE, AND CALCIUM CHLORIDE 100 ML/HR: 5; .6; .31; .03; .02 INJECTION, SOLUTION INTRAVENOUS at 16:40

## 2020-10-30 RX ADMIN — LORAZEPAM 1 MG: 2 INJECTION INTRAMUSCULAR; INTRAVENOUS at 12:10

## 2020-10-30 RX ADMIN — MORPHINE SULFATE 10 MG: 10 INJECTION INTRAVENOUS at 11:54

## 2020-10-30 RX ADMIN — LORAZEPAM 1 MG: 2 INJECTION INTRAMUSCULAR; INTRAVENOUS at 03:33

## 2020-10-30 RX ADMIN — MORPHINE SULFATE 2 MG: 2 INJECTION, SOLUTION INTRAMUSCULAR; INTRAVENOUS at 03:09

## 2020-10-30 RX ADMIN — Medication 8 MG/HR: at 10:33

## 2020-10-30 RX ADMIN — ONDANSETRON 4 MG: 2 INJECTION INTRAMUSCULAR; INTRAVENOUS at 10:29

## 2020-10-30 RX ADMIN — MORPHINE SULFATE 5 MG: 10 INJECTION INTRAVENOUS at 20:42

## 2020-10-30 RX ADMIN — MORPHINE SULFATE 10 MG: 10 INJECTION INTRAVENOUS at 23:52

## 2020-10-30 RX ADMIN — HYDROMORPHONE HYDROCHLORIDE 1 MG: 1 INJECTION, SOLUTION INTRAMUSCULAR; INTRAVENOUS; SUBCUTANEOUS at 08:39

## 2020-10-30 RX ADMIN — LORAZEPAM 1 MG: 2 INJECTION INTRAMUSCULAR; INTRAVENOUS at 21:35

## 2020-10-30 RX ADMIN — ONDANSETRON 4 MG: 2 INJECTION INTRAMUSCULAR; INTRAVENOUS at 20:42

## 2020-10-31 ENCOUNTER — ANESTHESIA EVENT (INPATIENT)
Dept: PERIOP | Facility: HOSPITAL | Age: 60
DRG: 374 | End: 2020-10-31
Payer: MEDICARE

## 2020-10-31 ENCOUNTER — APPOINTMENT (INPATIENT)
Dept: RADIOLOGY | Facility: HOSPITAL | Age: 60
DRG: 374 | End: 2020-10-31
Payer: MEDICARE

## 2020-10-31 ENCOUNTER — APPOINTMENT (INPATIENT)
Dept: CT IMAGING | Facility: HOSPITAL | Age: 60
DRG: 374 | End: 2020-10-31
Payer: MEDICARE

## 2020-10-31 ENCOUNTER — APPOINTMENT (INPATIENT)
Dept: PERIOP | Facility: HOSPITAL | Age: 60
DRG: 374 | End: 2020-10-31
Payer: MEDICARE

## 2020-10-31 ENCOUNTER — ANESTHESIA (INPATIENT)
Dept: PERIOP | Facility: HOSPITAL | Age: 60
DRG: 374 | End: 2020-10-31
Payer: MEDICARE

## 2020-10-31 VITALS — HEART RATE: 95 BPM

## 2020-10-31 PROBLEM — R65.10 SIRS (SYSTEMIC INFLAMMATORY RESPONSE SYNDROME) (HCC): Status: ACTIVE | Noted: 2020-10-31

## 2020-10-31 LAB
ALBUMIN SERPL BCP-MCNC: 2.2 G/DL (ref 3.5–5)
ALP SERPL-CCNC: 543 U/L (ref 46–116)
ALT SERPL W P-5'-P-CCNC: 94 U/L (ref 12–78)
ANION GAP SERPL CALCULATED.3IONS-SCNC: 8 MMOL/L (ref 4–13)
AST SERPL W P-5'-P-CCNC: 250 U/L (ref 5–45)
BILIRUB SERPL-MCNC: 2.85 MG/DL (ref 0.2–1)
BUN SERPL-MCNC: 4 MG/DL (ref 5–25)
CALCIUM ALBUM COR SERPL-MCNC: 9.8 MG/DL (ref 8.3–10.1)
CALCIUM SERPL-MCNC: 8.4 MG/DL (ref 8.3–10.1)
CHLORIDE SERPL-SCNC: 100 MMOL/L (ref 100–108)
CO2 SERPL-SCNC: 26 MMOL/L (ref 21–32)
CREAT SERPL-MCNC: 0.78 MG/DL (ref 0.6–1.3)
ERYTHROCYTE [DISTWIDTH] IN BLOOD BY AUTOMATED COUNT: 15.9 % (ref 11.6–15.1)
GFR SERPL CREATININE-BSD FRML MDRD: 98 ML/MIN/1.73SQ M
GLUCOSE SERPL-MCNC: 102 MG/DL (ref 65–140)
HCT VFR BLD AUTO: 27.6 % (ref 36.5–49.3)
HGB BLD-MCNC: 8.7 G/DL (ref 12–17)
INR PPP: 1.34 (ref 0.84–1.19)
LACTATE SERPL-SCNC: 1.1 MMOL/L (ref 0.5–2)
LACTATE SERPL-SCNC: 2.4 MMOL/L (ref 0.5–2)
MCH RBC QN AUTO: 26 PG (ref 26.8–34.3)
MCHC RBC AUTO-ENTMCNC: 31.5 G/DL (ref 31.4–37.4)
MCV RBC AUTO: 82 FL (ref 82–98)
PLATELET # BLD AUTO: 189 THOUSANDS/UL (ref 149–390)
PMV BLD AUTO: 10.3 FL (ref 8.9–12.7)
POTASSIUM SERPL-SCNC: 3.4 MMOL/L (ref 3.5–5.3)
PROCALCITONIN SERPL-MCNC: 5.64 NG/ML
PROT SERPL-MCNC: 5.3 G/DL (ref 6.4–8.2)
PROTHROMBIN TIME: 16.7 SECONDS (ref 11.6–14.5)
RBC # BLD AUTO: 3.35 MILLION/UL (ref 3.88–5.62)
SODIUM SERPL-SCNC: 134 MMOL/L (ref 136–145)
TROPONIN I SERPL-MCNC: <0.02 NG/ML
WBC # BLD AUTO: 9.74 THOUSAND/UL (ref 4.31–10.16)

## 2020-10-31 PROCEDURE — 87040 BLOOD CULTURE FOR BACTERIA: CPT | Performed by: PHYSICIAN ASSISTANT

## 2020-10-31 PROCEDURE — 84484 ASSAY OF TROPONIN QUANT: CPT | Performed by: PHYSICIAN ASSISTANT

## 2020-10-31 PROCEDURE — 85610 PROTHROMBIN TIME: CPT | Performed by: PHYSICIAN ASSISTANT

## 2020-10-31 PROCEDURE — 83605 ASSAY OF LACTIC ACID: CPT | Performed by: PHYSICIAN ASSISTANT

## 2020-10-31 PROCEDURE — 87077 CULTURE AEROBIC IDENTIFY: CPT | Performed by: PHYSICIAN ASSISTANT

## 2020-10-31 PROCEDURE — 74018 RADEX ABDOMEN 1 VIEW: CPT

## 2020-10-31 PROCEDURE — 84145 PROCALCITONIN (PCT): CPT | Performed by: PHYSICIAN ASSISTANT

## 2020-10-31 PROCEDURE — 71275 CT ANGIOGRAPHY CHEST: CPT

## 2020-10-31 PROCEDURE — 85027 COMPLETE CBC AUTOMATED: CPT | Performed by: PHYSICIAN ASSISTANT

## 2020-10-31 PROCEDURE — 87147 CULTURE TYPE IMMUNOLOGIC: CPT | Performed by: PHYSICIAN ASSISTANT

## 2020-10-31 PROCEDURE — C1769 GUIDE WIRE: HCPCS

## 2020-10-31 PROCEDURE — 87186 SC STD MICRODIL/AGAR DIL: CPT | Performed by: PHYSICIAN ASSISTANT

## 2020-10-31 PROCEDURE — 74328 X-RAY BILE DUCT ENDOSCOPY: CPT

## 2020-10-31 PROCEDURE — C9113 INJ PANTOPRAZOLE SODIUM, VIA: HCPCS | Performed by: PHYSICIAN ASSISTANT

## 2020-10-31 PROCEDURE — G1004 CDSM NDSC: HCPCS

## 2020-10-31 PROCEDURE — 99232 SBSQ HOSP IP/OBS MODERATE 35: CPT | Performed by: PHYSICIAN ASSISTANT

## 2020-10-31 PROCEDURE — 93005 ELECTROCARDIOGRAM TRACING: CPT

## 2020-10-31 PROCEDURE — 80053 COMPREHEN METABOLIC PANEL: CPT | Performed by: PHYSICIAN ASSISTANT

## 2020-10-31 RX ORDER — ONDANSETRON 2 MG/ML
INJECTION INTRAMUSCULAR; INTRAVENOUS AS NEEDED
Status: DISCONTINUED | OUTPATIENT
Start: 2020-10-31 | End: 2020-10-31

## 2020-10-31 RX ORDER — FENTANYL CITRATE 50 UG/ML
INJECTION, SOLUTION INTRAMUSCULAR; INTRAVENOUS AS NEEDED
Status: DISCONTINUED | OUTPATIENT
Start: 2020-10-31 | End: 2020-10-31

## 2020-10-31 RX ORDER — HEPARIN SODIUM 5000 [USP'U]/ML
5000 INJECTION, SOLUTION INTRAVENOUS; SUBCUTANEOUS EVERY 8 HOURS SCHEDULED
Status: DISCONTINUED | OUTPATIENT
Start: 2020-11-01 | End: 2020-10-31

## 2020-10-31 RX ORDER — FENTANYL CITRATE/PF 50 MCG/ML
25 SYRINGE (ML) INJECTION
Status: CANCELLED | OUTPATIENT
Start: 2020-10-31

## 2020-10-31 RX ORDER — SODIUM CHLORIDE, SODIUM LACTATE, POTASSIUM CHLORIDE, CALCIUM CHLORIDE 600; 310; 30; 20 MG/100ML; MG/100ML; MG/100ML; MG/100ML
75 INJECTION, SOLUTION INTRAVENOUS CONTINUOUS
Status: CANCELLED | OUTPATIENT
Start: 2020-10-31

## 2020-10-31 RX ORDER — MORPHINE SULFATE 10 MG/ML
10 INJECTION, SOLUTION INTRAMUSCULAR; INTRAVENOUS EVERY 4 HOURS PRN
Status: DISCONTINUED | OUTPATIENT
Start: 2020-10-31 | End: 2020-11-02

## 2020-10-31 RX ORDER — SODIUM CHLORIDE, SODIUM LACTATE, POTASSIUM CHLORIDE, CALCIUM CHLORIDE 600; 310; 30; 20 MG/100ML; MG/100ML; MG/100ML; MG/100ML
INJECTION, SOLUTION INTRAVENOUS CONTINUOUS PRN
Status: DISCONTINUED | OUTPATIENT
Start: 2020-10-31 | End: 2020-10-31

## 2020-10-31 RX ORDER — PROPOFOL 10 MG/ML
INJECTION, EMULSION INTRAVENOUS AS NEEDED
Status: DISCONTINUED | OUTPATIENT
Start: 2020-10-31 | End: 2020-10-31

## 2020-10-31 RX ORDER — LIDOCAINE HYDROCHLORIDE 10 MG/ML
INJECTION, SOLUTION EPIDURAL; INFILTRATION; INTRACAUDAL; PERINEURAL AS NEEDED
Status: DISCONTINUED | OUTPATIENT
Start: 2020-10-31 | End: 2020-10-31

## 2020-10-31 RX ORDER — ALBUTEROL SULFATE 2.5 MG/3ML
2.5 SOLUTION RESPIRATORY (INHALATION) ONCE AS NEEDED
Status: CANCELLED | OUTPATIENT
Start: 2020-10-31

## 2020-10-31 RX ORDER — ALBUMIN, HUMAN INJ 5% 5 %
SOLUTION INTRAVENOUS CONTINUOUS PRN
Status: DISCONTINUED | OUTPATIENT
Start: 2020-10-31 | End: 2020-10-31

## 2020-10-31 RX ORDER — ONDANSETRON 2 MG/ML
4 INJECTION INTRAMUSCULAR; INTRAVENOUS ONCE AS NEEDED
Status: CANCELLED | OUTPATIENT
Start: 2020-10-31

## 2020-10-31 RX ADMIN — MORPHINE SULFATE 10 MG: 10 INJECTION INTRAVENOUS at 12:50

## 2020-10-31 RX ADMIN — FENTANYL CITRATE 50 MCG: 50 INJECTION INTRAMUSCULAR; INTRAVENOUS at 14:20

## 2020-10-31 RX ADMIN — PHENYLEPHRINE HYDROCHLORIDE 100 MCG: 10 INJECTION INTRAVENOUS at 14:29

## 2020-10-31 RX ADMIN — MORPHINE SULFATE 10 MG: 10 INJECTION INTRAVENOUS at 17:05

## 2020-10-31 RX ADMIN — MORPHINE SULFATE 10 MG: 10 INJECTION INTRAVENOUS at 06:41

## 2020-10-31 RX ADMIN — SODIUM CHLORIDE, SODIUM LACTATE, POTASSIUM CHLORIDE, AND CALCIUM CHLORIDE: .6; .31; .03; .02 INJECTION, SOLUTION INTRAVENOUS at 14:25

## 2020-10-31 RX ADMIN — DEXTROSE, SODIUM CHLORIDE, SODIUM LACTATE, POTASSIUM CHLORIDE, AND CALCIUM CHLORIDE 100 ML/HR: 5; .6; .31; .03; .02 INJECTION, SOLUTION INTRAVENOUS at 02:43

## 2020-10-31 RX ADMIN — Medication 8 MG/HR: at 06:41

## 2020-10-31 RX ADMIN — LIDOCAINE HYDROCHLORIDE 50 MG: 10 INJECTION, SOLUTION EPIDURAL; INFILTRATION; INTRACAUDAL at 14:25

## 2020-10-31 RX ADMIN — IOHEXOL 85 ML: 350 INJECTION, SOLUTION INTRAVENOUS at 12:09

## 2020-10-31 RX ADMIN — ONDANSETRON 4 MG: 2 INJECTION INTRAMUSCULAR; INTRAVENOUS at 14:25

## 2020-10-31 RX ADMIN — PHENYLEPHRINE HYDROCHLORIDE 100 MCG: 10 INJECTION INTRAVENOUS at 14:43

## 2020-10-31 RX ADMIN — LORAZEPAM 1 MG: 2 INJECTION INTRAMUSCULAR; INTRAVENOUS at 19:58

## 2020-10-31 RX ADMIN — DEXTROSE, SODIUM CHLORIDE, SODIUM LACTATE, POTASSIUM CHLORIDE, AND CALCIUM CHLORIDE 100 ML/HR: 5; .6; .31; .03; .02 INJECTION, SOLUTION INTRAVENOUS at 18:01

## 2020-10-31 RX ADMIN — LORAZEPAM 1 MG: 2 INJECTION INTRAMUSCULAR; INTRAVENOUS at 10:37

## 2020-10-31 RX ADMIN — LORAZEPAM 1 MG: 2 INJECTION INTRAMUSCULAR; INTRAVENOUS at 04:18

## 2020-10-31 RX ADMIN — PIPERACILLIN AND TAZOBACTAM 3.38 G: 3; .375 INJECTION, POWDER, LYOPHILIZED, FOR SOLUTION INTRAVENOUS at 17:21

## 2020-10-31 RX ADMIN — PROPOFOL 70 MG: 10 INJECTION, EMULSION INTRAVENOUS at 14:25

## 2020-10-31 RX ADMIN — MORPHINE SULFATE 10 MG: 10 INJECTION INTRAVENOUS at 23:03

## 2020-10-31 RX ADMIN — MORPHINE SULFATE 5 MG: 10 INJECTION INTRAVENOUS at 03:04

## 2020-10-31 RX ADMIN — PHENYLEPHRINE HYDROCHLORIDE 20 MCG/MIN: 10 INJECTION INTRAVENOUS at 14:51

## 2020-10-31 RX ADMIN — FENTANYL CITRATE 50 MCG: 50 INJECTION INTRAMUSCULAR; INTRAVENOUS at 14:25

## 2020-10-31 RX ADMIN — MORPHINE SULFATE 5 MG: 10 INJECTION INTRAVENOUS at 07:58

## 2020-10-31 RX ADMIN — PHENYLEPHRINE HYDROCHLORIDE 200 MCG: 10 INJECTION INTRAVENOUS at 14:47

## 2020-10-31 RX ADMIN — ALBUMIN (HUMAN): 12.5 SOLUTION INTRAVENOUS at 15:00

## 2020-10-31 RX ADMIN — ALBUMIN (HUMAN): 12.5 SOLUTION INTRAVENOUS at 14:38

## 2020-10-31 RX ADMIN — MORPHINE SULFATE 5 MG: 10 INJECTION INTRAVENOUS at 19:46

## 2020-10-31 RX ADMIN — PHENYLEPHRINE HYDROCHLORIDE 100 MCG: 10 INJECTION INTRAVENOUS at 14:51

## 2020-11-01 PROBLEM — R65.20 SEVERE SEPSIS WITH ACUTE ORGAN DYSFUNCTION DUE TO GRAM POSITIVE BACTERIA (HCC): Status: ACTIVE | Noted: 2020-10-31

## 2020-11-01 PROBLEM — K83.09 ACUTE CHOLANGITIS: Status: ACTIVE | Noted: 2020-10-30

## 2020-11-01 PROBLEM — R78.81 GRAM-POSITIVE BACTEREMIA: Status: ACTIVE | Noted: 2020-11-01

## 2020-11-01 PROBLEM — D62 ACUTE BLOOD LOSS ANEMIA: Status: ACTIVE | Noted: 2020-11-01

## 2020-11-01 PROBLEM — A41.89 SEVERE SEPSIS WITH ACUTE ORGAN DYSFUNCTION DUE TO GRAM POSITIVE BACTERIA (HCC): Status: ACTIVE | Noted: 2020-10-31

## 2020-11-01 LAB
ABO GROUP BLD: NORMAL
ABO GROUP BLD: NORMAL
ALBUMIN SERPL BCP-MCNC: 2 G/DL (ref 3.5–5)
ALBUMIN SERPL BCP-MCNC: 2.2 G/DL (ref 3.5–5)
ALP SERPL-CCNC: 264 U/L (ref 46–116)
ALP SERPL-CCNC: 343 U/L (ref 46–116)
ALT SERPL W P-5'-P-CCNC: 39 U/L (ref 12–78)
ALT SERPL W P-5'-P-CCNC: 51 U/L (ref 12–78)
ANION GAP SERPL CALCULATED.3IONS-SCNC: 7 MMOL/L (ref 4–13)
AST SERPL W P-5'-P-CCNC: 104 U/L (ref 5–45)
AST SERPL W P-5'-P-CCNC: 64 U/L (ref 5–45)
ATRIAL RATE: 112 BPM
ATRIAL RATE: 113 BPM
BILIRUB DIRECT SERPL-MCNC: 1.4 MG/DL (ref 0–0.2)
BILIRUB SERPL-MCNC: 1.63 MG/DL (ref 0.2–1)
BILIRUB SERPL-MCNC: 2.55 MG/DL (ref 0.2–1)
BLD GP AB SCN SERPL QL: NEGATIVE
BUN SERPL-MCNC: 7 MG/DL (ref 5–25)
CALCIUM ALBUM COR SERPL-MCNC: 8.7 MG/DL (ref 8.3–10.1)
CALCIUM SERPL-MCNC: 7.3 MG/DL (ref 8.3–10.1)
CHLORIDE SERPL-SCNC: 101 MMOL/L (ref 100–108)
CO2 SERPL-SCNC: 27 MMOL/L (ref 21–32)
CREAT SERPL-MCNC: 0.78 MG/DL (ref 0.6–1.3)
ERYTHROCYTE [DISTWIDTH] IN BLOOD BY AUTOMATED COUNT: 16.2 % (ref 11.6–15.1)
GFR SERPL CREATININE-BSD FRML MDRD: 98 ML/MIN/1.73SQ M
GLUCOSE SERPL-MCNC: 99 MG/DL (ref 65–140)
HCT VFR BLD AUTO: 22.6 % (ref 36.5–49.3)
HCT VFR BLD AUTO: 25.9 % (ref 36.5–49.3)
HGB BLD-MCNC: 7.3 G/DL (ref 12–17)
HGB BLD-MCNC: 8.5 G/DL (ref 12–17)
INR PPP: 1.59 (ref 0.84–1.19)
MAGNESIUM SERPL-MCNC: 1.4 MG/DL (ref 1.6–2.6)
MCH RBC QN AUTO: 26.9 PG (ref 26.8–34.3)
MCHC RBC AUTO-ENTMCNC: 32.3 G/DL (ref 31.4–37.4)
MCV RBC AUTO: 83 FL (ref 82–98)
P AXIS: 87 DEGREES
P AXIS: 88 DEGREES
PLATELET # BLD AUTO: 122 THOUSANDS/UL (ref 149–390)
PMV BLD AUTO: 11.8 FL (ref 8.9–12.7)
POTASSIUM SERPL-SCNC: 3.1 MMOL/L (ref 3.5–5.3)
PR INTERVAL: 168 MS
PR INTERVAL: 168 MS
PROCALCITONIN SERPL-MCNC: 8.32 NG/ML
PROT SERPL-MCNC: 4.5 G/DL (ref 6.4–8.2)
PROT SERPL-MCNC: 4.9 G/DL (ref 6.4–8.2)
PROTHROMBIN TIME: 19 SECONDS (ref 11.6–14.5)
QRS AXIS: -10 DEGREES
QRS AXIS: -6 DEGREES
QRSD INTERVAL: 80 MS
QRSD INTERVAL: 80 MS
QT INTERVAL: 312 MS
QT INTERVAL: 312 MS
QTC INTERVAL: 425 MS
QTC INTERVAL: 427 MS
RBC # BLD AUTO: 2.71 MILLION/UL (ref 3.88–5.62)
RH BLD: POSITIVE
RH BLD: POSITIVE
SODIUM SERPL-SCNC: 135 MMOL/L (ref 136–145)
SPECIMEN EXPIRATION DATE: NORMAL
T WAVE AXIS: 91 DEGREES
T WAVE AXIS: 93 DEGREES
VENTRICULAR RATE: 112 BPM
VENTRICULAR RATE: 113 BPM
WBC # BLD AUTO: 9.11 THOUSAND/UL (ref 4.31–10.16)

## 2020-11-01 PROCEDURE — 86920 COMPATIBILITY TEST SPIN: CPT

## 2020-11-01 PROCEDURE — 99232 SBSQ HOSP IP/OBS MODERATE 35: CPT | Performed by: PHYSICIAN ASSISTANT

## 2020-11-01 PROCEDURE — 80053 COMPREHEN METABOLIC PANEL: CPT | Performed by: INTERNAL MEDICINE

## 2020-11-01 PROCEDURE — 86850 RBC ANTIBODY SCREEN: CPT | Performed by: PHYSICIAN ASSISTANT

## 2020-11-01 PROCEDURE — 80076 HEPATIC FUNCTION PANEL: CPT | Performed by: PHYSICIAN ASSISTANT

## 2020-11-01 PROCEDURE — 84145 PROCALCITONIN (PCT): CPT | Performed by: PHYSICIAN ASSISTANT

## 2020-11-01 PROCEDURE — 30233N1 TRANSFUSION OF NONAUTOLOGOUS RED BLOOD CELLS INTO PERIPHERAL VEIN, PERCUTANEOUS APPROACH: ICD-10-PCS | Performed by: INTERNAL MEDICINE

## 2020-11-01 PROCEDURE — 86901 BLOOD TYPING SEROLOGIC RH(D): CPT | Performed by: PHYSICIAN ASSISTANT

## 2020-11-01 PROCEDURE — 85027 COMPLETE CBC AUTOMATED: CPT | Performed by: INTERNAL MEDICINE

## 2020-11-01 PROCEDURE — 85018 HEMOGLOBIN: CPT | Performed by: INTERNAL MEDICINE

## 2020-11-01 PROCEDURE — 85014 HEMATOCRIT: CPT | Performed by: INTERNAL MEDICINE

## 2020-11-01 PROCEDURE — 85610 PROTHROMBIN TIME: CPT | Performed by: INTERNAL MEDICINE

## 2020-11-01 PROCEDURE — P9040 RBC LEUKOREDUCED IRRADIATED: HCPCS

## 2020-11-01 PROCEDURE — 87040 BLOOD CULTURE FOR BACTERIA: CPT | Performed by: PHYSICIAN ASSISTANT

## 2020-11-01 PROCEDURE — 83735 ASSAY OF MAGNESIUM: CPT | Performed by: PHYSICIAN ASSISTANT

## 2020-11-01 PROCEDURE — 93010 ELECTROCARDIOGRAM REPORT: CPT | Performed by: INTERNAL MEDICINE

## 2020-11-01 PROCEDURE — C9113 INJ PANTOPRAZOLE SODIUM, VIA: HCPCS | Performed by: INTERNAL MEDICINE

## 2020-11-01 PROCEDURE — C9113 INJ PANTOPRAZOLE SODIUM, VIA: HCPCS | Performed by: NURSE PRACTITIONER

## 2020-11-01 PROCEDURE — 86900 BLOOD TYPING SEROLOGIC ABO: CPT | Performed by: PHYSICIAN ASSISTANT

## 2020-11-01 PROCEDURE — 0T9B70Z DRAINAGE OF BLADDER WITH DRAINAGE DEVICE, VIA NATURAL OR ARTIFICIAL OPENING: ICD-10-PCS | Performed by: INTERNAL MEDICINE

## 2020-11-01 PROCEDURE — 99223 1ST HOSP IP/OBS HIGH 75: CPT | Performed by: INTERNAL MEDICINE

## 2020-11-01 RX ORDER — MAGNESIUM SULFATE HEPTAHYDRATE 40 MG/ML
2 INJECTION, SOLUTION INTRAVENOUS ONCE
Status: COMPLETED | OUTPATIENT
Start: 2020-11-01 | End: 2020-11-02

## 2020-11-01 RX ORDER — ALBUMIN, HUMAN INJ 5% 5 %
SOLUTION INTRAVENOUS
Status: DISPENSED
Start: 2020-11-01 | End: 2020-11-02

## 2020-11-01 RX ORDER — SUCRALFATE 1 G/1
1 TABLET ORAL
Status: DISCONTINUED | OUTPATIENT
Start: 2020-11-01 | End: 2020-11-08 | Stop reason: HOSPADM

## 2020-11-01 RX ORDER — ACETAMINOPHEN 160 MG/5ML
650 SUSPENSION, ORAL (FINAL DOSE FORM) ORAL EVERY 4 HOURS PRN
Status: DISCONTINUED | OUTPATIENT
Start: 2020-11-01 | End: 2020-11-08 | Stop reason: HOSPADM

## 2020-11-01 RX ORDER — ALBUMIN, HUMAN INJ 5% 5 %
12.5 SOLUTION INTRAVENOUS ONCE
Status: COMPLETED | OUTPATIENT
Start: 2020-11-01 | End: 2020-11-01

## 2020-11-01 RX ORDER — VANCOMYCIN HYDROCHLORIDE 1 G/200ML
15 INJECTION, SOLUTION INTRAVENOUS EVERY 12 HOURS
Status: DISCONTINUED | OUTPATIENT
Start: 2020-11-01 | End: 2020-11-02

## 2020-11-01 RX ORDER — POTASSIUM CHLORIDE 14.9 MG/ML
20 INJECTION INTRAVENOUS
Status: COMPLETED | OUTPATIENT
Start: 2020-11-01 | End: 2020-11-01

## 2020-11-01 RX ADMIN — POTASSIUM CHLORIDE 20 MEQ: 14.9 INJECTION, SOLUTION INTRAVENOUS at 18:42

## 2020-11-01 RX ADMIN — LORAZEPAM 1 MG: 2 INJECTION INTRAMUSCULAR; INTRAVENOUS at 05:29

## 2020-11-01 RX ADMIN — MORPHINE SULFATE 10 MG: 10 INJECTION INTRAVENOUS at 21:39

## 2020-11-01 RX ADMIN — MORPHINE SULFATE 10 MG: 10 INJECTION INTRAVENOUS at 12:24

## 2020-11-01 RX ADMIN — Medication 8 MG/HR: at 01:28

## 2020-11-01 RX ADMIN — LORAZEPAM 1 MG: 2 INJECTION INTRAMUSCULAR; INTRAVENOUS at 14:08

## 2020-11-01 RX ADMIN — MORPHINE SULFATE 10 MG: 10 INJECTION INTRAVENOUS at 17:19

## 2020-11-01 RX ADMIN — ALBUMIN (HUMAN) 12.5 G: 12.5 INJECTION, SOLUTION INTRAVENOUS at 21:34

## 2020-11-01 RX ADMIN — PIPERACILLIN AND TAZOBACTAM 3.38 G: 3; .375 INJECTION, POWDER, LYOPHILIZED, FOR SOLUTION INTRAVENOUS at 05:29

## 2020-11-01 RX ADMIN — LORAZEPAM 1 MG: 2 INJECTION INTRAMUSCULAR; INTRAVENOUS at 19:08

## 2020-11-01 RX ADMIN — MORPHINE SULFATE 5 MG: 10 INJECTION INTRAVENOUS at 01:22

## 2020-11-01 RX ADMIN — DEXTROSE, SODIUM CHLORIDE, SODIUM LACTATE, POTASSIUM CHLORIDE, AND CALCIUM CHLORIDE 100 ML/HR: 5; .6; .31; .03; .02 INJECTION, SOLUTION INTRAVENOUS at 19:11

## 2020-11-01 RX ADMIN — MORPHINE SULFATE 10 MG: 10 INJECTION INTRAVENOUS at 08:01

## 2020-11-01 RX ADMIN — DEXTROSE, SODIUM CHLORIDE, SODIUM LACTATE, POTASSIUM CHLORIDE, AND CALCIUM CHLORIDE 100 ML/HR: 5; .6; .31; .03; .02 INJECTION, SOLUTION INTRAVENOUS at 05:18

## 2020-11-01 RX ADMIN — MAGNESIUM SULFATE IN WATER 2 G: 40 INJECTION, SOLUTION INTRAVENOUS at 22:55

## 2020-11-01 RX ADMIN — VANCOMYCIN HYDROCHLORIDE 1000 MG: 1 INJECTION, SOLUTION INTRAVENOUS at 12:09

## 2020-11-01 RX ADMIN — POTASSIUM CHLORIDE 20 MEQ: 14.9 INJECTION, SOLUTION INTRAVENOUS at 17:13

## 2020-11-01 RX ADMIN — ALBUMIN (HUMAN) 12.5 G: 12.5 INJECTION, SOLUTION INTRAVENOUS at 22:21

## 2020-11-01 RX ADMIN — SUCRALFATE 1 G: 1 TABLET ORAL at 21:39

## 2020-11-01 RX ADMIN — ACETAMINOPHEN 650 MG: 650 SUSPENSION ORAL at 18:50

## 2020-11-01 RX ADMIN — PIPERACILLIN AND TAZOBACTAM 3.38 G: 3; .375 INJECTION, POWDER, LYOPHILIZED, FOR SOLUTION INTRAVENOUS at 00:06

## 2020-11-01 RX ADMIN — Medication 8 MG/HR: at 22:30

## 2020-11-01 RX ADMIN — SUCRALFATE 1 G: 1 TABLET ORAL at 17:12

## 2020-11-01 RX ADMIN — MORPHINE SULFATE 5 MG: 10 INJECTION INTRAVENOUS at 11:27

## 2020-11-01 RX ADMIN — Medication 8 MG/HR: at 11:50

## 2020-11-02 ENCOUNTER — APPOINTMENT (INPATIENT)
Dept: NON INVASIVE DIAGNOSTICS | Facility: HOSPITAL | Age: 60
DRG: 374 | End: 2020-11-02
Payer: MEDICARE

## 2020-11-02 PROBLEM — E43 SEVERE PROTEIN-CALORIE MALNUTRITION (HCC): Status: ACTIVE | Noted: 2020-11-02

## 2020-11-02 LAB
ABO GROUP BLD BPU: NORMAL
ABO GROUP BLD BPU: NORMAL
ANION GAP SERPL CALCULATED.3IONS-SCNC: 6 MMOL/L (ref 4–13)
BACTERIA UR QL AUTO: ABNORMAL /HPF
BASOPHILS # BLD AUTO: 0.01 THOUSANDS/ΜL (ref 0–0.1)
BASOPHILS NFR BLD AUTO: 0 % (ref 0–1)
BILIRUB UR QL STRIP: NEGATIVE
BPU ID: NORMAL
BPU ID: NORMAL
BUN SERPL-MCNC: 13 MG/DL (ref 5–25)
CALCIUM SERPL-MCNC: 7.9 MG/DL (ref 8.3–10.1)
CHLORIDE SERPL-SCNC: 99 MMOL/L (ref 100–108)
CLARITY UR: CLEAR
CO2 SERPL-SCNC: 26 MMOL/L (ref 21–32)
COLOR UR: YELLOW
CREAT SERPL-MCNC: 1.61 MG/DL (ref 0.6–1.3)
CROSSMATCH: NORMAL
CROSSMATCH: NORMAL
EOSINOPHIL # BLD AUTO: 0.11 THOUSAND/ΜL (ref 0–0.61)
EOSINOPHIL NFR BLD AUTO: 1 % (ref 0–6)
ERYTHROCYTE [DISTWIDTH] IN BLOOD BY AUTOMATED COUNT: 14.8 % (ref 11.6–15.1)
GFR SERPL CREATININE-BSD FRML MDRD: 46 ML/MIN/1.73SQ M
GLUCOSE SERPL-MCNC: 128 MG/DL (ref 65–140)
GLUCOSE UR STRIP-MCNC: NEGATIVE MG/DL
HCT VFR BLD AUTO: 24.7 % (ref 36.5–49.3)
HCT VFR BLD AUTO: 24.8 % (ref 36.5–49.3)
HCT VFR BLD AUTO: 26.1 % (ref 36.5–49.3)
HGB BLD-MCNC: 8.2 G/DL (ref 12–17)
HGB BLD-MCNC: 8.2 G/DL (ref 12–17)
HGB BLD-MCNC: 8.6 G/DL (ref 12–17)
HGB UR QL STRIP.AUTO: ABNORMAL
IMM GRANULOCYTES # BLD AUTO: 0.07 THOUSAND/UL (ref 0–0.2)
IMM GRANULOCYTES NFR BLD AUTO: 1 % (ref 0–2)
INR PPP: 1.52 (ref 0.84–1.19)
KETONES UR STRIP-MCNC: NEGATIVE MG/DL
LEUKOCYTE ESTERASE UR QL STRIP: NEGATIVE
LYMPHOCYTES # BLD AUTO: 0.38 THOUSANDS/ΜL (ref 0.6–4.47)
LYMPHOCYTES NFR BLD AUTO: 5 % (ref 14–44)
MCH RBC QN AUTO: 27.2 PG (ref 26.8–34.3)
MCHC RBC AUTO-ENTMCNC: 33.2 G/DL (ref 31.4–37.4)
MCV RBC AUTO: 82 FL (ref 82–98)
MONOCYTES # BLD AUTO: 0.66 THOUSAND/ΜL (ref 0.17–1.22)
MONOCYTES NFR BLD AUTO: 8 % (ref 4–12)
NEUTROPHILS # BLD AUTO: 7.15 THOUSANDS/ΜL (ref 1.85–7.62)
NEUTS SEG NFR BLD AUTO: 85 % (ref 43–75)
NITRITE UR QL STRIP: NEGATIVE
NON-SQ EPI CELLS URNS QL MICRO: ABNORMAL /HPF
NRBC BLD AUTO-RTO: 0 /100 WBCS
PH UR STRIP.AUTO: 5.5 [PH]
PLATELET # BLD AUTO: 115 THOUSANDS/UL (ref 149–390)
PMV BLD AUTO: 10.3 FL (ref 8.9–12.7)
POTASSIUM SERPL-SCNC: 3.1 MMOL/L (ref 3.5–5.3)
PROCALCITONIN SERPL-MCNC: 13.85 NG/ML
PROT UR STRIP-MCNC: NEGATIVE MG/DL
PROTHROMBIN TIME: 18.4 SECONDS (ref 11.6–14.5)
RBC # BLD AUTO: 3.01 MILLION/UL (ref 3.88–5.62)
RBC #/AREA URNS AUTO: ABNORMAL /HPF
SODIUM SERPL-SCNC: 131 MMOL/L (ref 136–145)
SP GR UR STRIP.AUTO: <=1.005 (ref 1–1.03)
UNIT DISPENSE STATUS: NORMAL
UNIT DISPENSE STATUS: NORMAL
UNIT PRODUCT CODE: NORMAL
UNIT PRODUCT CODE: NORMAL
UNIT RH: NORMAL
UNIT RH: NORMAL
UROBILINOGEN UR QL STRIP.AUTO: 0.2 E.U./DL
VANCOMYCIN SERPL-MCNC: 13.8 UG/ML
WBC # BLD AUTO: 8.38 THOUSAND/UL (ref 4.31–10.16)
WBC #/AREA URNS AUTO: ABNORMAL /HPF

## 2020-11-02 PROCEDURE — 99232 SBSQ HOSP IP/OBS MODERATE 35: CPT | Performed by: INTERNAL MEDICINE

## 2020-11-02 PROCEDURE — 85014 HEMATOCRIT: CPT | Performed by: PHYSICIAN ASSISTANT

## 2020-11-02 PROCEDURE — 85025 COMPLETE CBC W/AUTO DIFF WBC: CPT | Performed by: PHYSICIAN ASSISTANT

## 2020-11-02 PROCEDURE — 81001 URINALYSIS AUTO W/SCOPE: CPT | Performed by: INTERNAL MEDICINE

## 2020-11-02 PROCEDURE — 93306 TTE W/DOPPLER COMPLETE: CPT | Performed by: INTERNAL MEDICINE

## 2020-11-02 PROCEDURE — C9113 INJ PANTOPRAZOLE SODIUM, VIA: HCPCS | Performed by: INTERNAL MEDICINE

## 2020-11-02 PROCEDURE — 85018 HEMOGLOBIN: CPT | Performed by: PHYSICIAN ASSISTANT

## 2020-11-02 PROCEDURE — 85018 HEMOGLOBIN: CPT | Performed by: INTERNAL MEDICINE

## 2020-11-02 PROCEDURE — 80048 BASIC METABOLIC PNL TOTAL CA: CPT | Performed by: NURSE PRACTITIONER

## 2020-11-02 PROCEDURE — 93306 TTE W/DOPPLER COMPLETE: CPT

## 2020-11-02 PROCEDURE — 84145 PROCALCITONIN (PCT): CPT | Performed by: NURSE PRACTITIONER

## 2020-11-02 PROCEDURE — C9113 INJ PANTOPRAZOLE SODIUM, VIA: HCPCS | Performed by: NURSE PRACTITIONER

## 2020-11-02 PROCEDURE — 99223 1ST HOSP IP/OBS HIGH 75: CPT | Performed by: NURSE PRACTITIONER

## 2020-11-02 PROCEDURE — 85014 HEMATOCRIT: CPT | Performed by: INTERNAL MEDICINE

## 2020-11-02 PROCEDURE — 99232 SBSQ HOSP IP/OBS MODERATE 35: CPT | Performed by: PHYSICIAN ASSISTANT

## 2020-11-02 PROCEDURE — 80202 ASSAY OF VANCOMYCIN: CPT | Performed by: INTERNAL MEDICINE

## 2020-11-02 PROCEDURE — 99233 SBSQ HOSP IP/OBS HIGH 50: CPT | Performed by: INTERNAL MEDICINE

## 2020-11-02 PROCEDURE — 85610 PROTHROMBIN TIME: CPT | Performed by: NURSE PRACTITIONER

## 2020-11-02 RX ORDER — VANCOMYCIN HYDROCHLORIDE 1 G/200ML
15 INJECTION, SOLUTION INTRAVENOUS DAILY PRN
Status: DISCONTINUED | OUTPATIENT
Start: 2020-11-02 | End: 2020-11-03

## 2020-11-02 RX ORDER — VANCOMYCIN HYDROCHLORIDE 1 G/200ML
15 INJECTION, SOLUTION INTRAVENOUS ONCE
Status: COMPLETED | OUTPATIENT
Start: 2020-11-02 | End: 2020-11-02

## 2020-11-02 RX ORDER — CLONAZEPAM 1 MG/1
1 TABLET ORAL
Status: DISCONTINUED | OUTPATIENT
Start: 2020-11-02 | End: 2020-11-04

## 2020-11-02 RX ORDER — CLONAZEPAM 1 MG/1
1 TABLET ORAL 2 TIMES DAILY PRN
Status: DISCONTINUED | OUTPATIENT
Start: 2020-11-02 | End: 2020-11-04

## 2020-11-02 RX ORDER — OXYCODONE HYDROCHLORIDE 10 MG/1
20 TABLET ORAL EVERY 6 HOURS PRN
Status: DISCONTINUED | OUTPATIENT
Start: 2020-11-02 | End: 2020-11-08 | Stop reason: HOSPADM

## 2020-11-02 RX ADMIN — OXYCODONE HYDROCHLORIDE 20 MG: 10 TABLET ORAL at 22:57

## 2020-11-02 RX ADMIN — CLONAZEPAM 1 MG: 1 TABLET ORAL at 22:29

## 2020-11-02 RX ADMIN — Medication 8 MG/HR: at 09:01

## 2020-11-02 RX ADMIN — MORPHINE SULFATE 10 MG: 10 INJECTION INTRAVENOUS at 09:14

## 2020-11-02 RX ADMIN — SUCRALFATE 1 G: 1 TABLET ORAL at 22:29

## 2020-11-02 RX ADMIN — TRAZODONE HYDROCHLORIDE 150 MG: 100 TABLET ORAL at 22:29

## 2020-11-02 RX ADMIN — DEXTROSE, SODIUM CHLORIDE, SODIUM LACTATE, POTASSIUM CHLORIDE, AND CALCIUM CHLORIDE 100 ML/HR: 5; .6; .31; .03; .02 INJECTION, SOLUTION INTRAVENOUS at 06:04

## 2020-11-02 RX ADMIN — SUCRALFATE 1 G: 1 TABLET ORAL at 15:19

## 2020-11-02 RX ADMIN — FENTANYL TRANSDERMAL 1 PATCH: 100 PATCH, EXTENDED RELEASE TRANSDERMAL at 09:09

## 2020-11-02 RX ADMIN — VANCOMYCIN HYDROCHLORIDE 1000 MG: 1 INJECTION, SOLUTION INTRAVENOUS at 00:30

## 2020-11-02 RX ADMIN — FENTANYL 50 MCG: 50 PATCH TRANSDERMAL at 09:09

## 2020-11-02 RX ADMIN — SUCRALFATE 1 G: 1 TABLET ORAL at 08:00

## 2020-11-02 RX ADMIN — MORPHINE SULFATE 10 MG: 10 INJECTION INTRAVENOUS at 02:53

## 2020-11-02 RX ADMIN — LORAZEPAM 1 MG: 2 INJECTION INTRAMUSCULAR; INTRAVENOUS at 04:45

## 2020-11-02 RX ADMIN — Medication 8 MG/HR: at 20:30

## 2020-11-02 RX ADMIN — CLONAZEPAM 1 MG: 1 TABLET ORAL at 15:48

## 2020-11-02 RX ADMIN — LORAZEPAM 1 MG: 2 INJECTION INTRAMUSCULAR; INTRAVENOUS at 11:19

## 2020-11-02 RX ADMIN — OXYCODONE HYDROCHLORIDE 20 MG: 10 TABLET ORAL at 15:19

## 2020-11-02 RX ADMIN — VANCOMYCIN HYDROCHLORIDE 1000 MG: 1 INJECTION, SOLUTION INTRAVENOUS at 14:59

## 2020-11-02 RX ADMIN — SUCRALFATE 1 G: 1 TABLET ORAL at 10:47

## 2020-11-03 ENCOUNTER — APPOINTMENT (INPATIENT)
Dept: RADIOLOGY | Facility: HOSPITAL | Age: 60
DRG: 374 | End: 2020-11-03
Payer: MEDICARE

## 2020-11-03 LAB
ALBUMIN SERPL BCP-MCNC: 2.5 G/DL (ref 3.5–5)
ALP SERPL-CCNC: 168 U/L (ref 46–116)
ALT SERPL W P-5'-P-CCNC: 22 U/L (ref 12–78)
ANION GAP SERPL CALCULATED.3IONS-SCNC: 7 MMOL/L (ref 4–13)
ANION GAP SERPL CALCULATED.3IONS-SCNC: 9 MMOL/L (ref 4–13)
AST SERPL W P-5'-P-CCNC: 20 U/L (ref 5–45)
BACTERIA BLD CULT: ABNORMAL
BACTERIA BLD CULT: ABNORMAL
BASOPHILS NFR BLD AUTO: 0 % (ref 0–1)
BILIRUB SERPL-MCNC: 1.15 MG/DL (ref 0.2–1)
BLD SMEAR INTERP: NORMAL
BUN SERPL-MCNC: 14 MG/DL (ref 5–25)
BUN SERPL-MCNC: 15 MG/DL (ref 5–25)
CALCIUM ALBUM COR SERPL-MCNC: 8.9 MG/DL (ref 8.3–10.1)
CALCIUM SERPL-MCNC: 7.7 MG/DL (ref 8.3–10.1)
CALCIUM SERPL-MCNC: 8.2 MG/DL (ref 8.3–10.1)
CHLORIDE SERPL-SCNC: 97 MMOL/L (ref 100–108)
CHLORIDE SERPL-SCNC: 99 MMOL/L (ref 100–108)
CO2 SERPL-SCNC: 22 MMOL/L (ref 21–32)
CO2 SERPL-SCNC: 25 MMOL/L (ref 21–32)
CREAT SERPL-MCNC: 2.25 MG/DL (ref 0.6–1.3)
CREAT SERPL-MCNC: 2.46 MG/DL (ref 0.6–1.3)
EOSINOPHIL NFR BLD AUTO: 2 % (ref 0–6)
ERYTHROCYTE [DISTWIDTH] IN BLOOD BY AUTOMATED COUNT: 14.9 % (ref 11.6–15.1)
FIBRINOGEN PPP-MCNC: 474 MG/DL (ref 227–495)
GFR SERPL CREATININE-BSD FRML MDRD: 27 ML/MIN/1.73SQ M
GFR SERPL CREATININE-BSD FRML MDRD: 31 ML/MIN/1.73SQ M
GLUCOSE SERPL-MCNC: 103 MG/DL (ref 65–140)
GLUCOSE SERPL-MCNC: 107 MG/DL (ref 65–140)
GRAM STN SPEC: ABNORMAL
GRAM STN SPEC: ABNORMAL
HCT VFR BLD AUTO: 20 % (ref 36.5–49.3)
HCT VFR BLD AUTO: 25.7 % (ref 36.5–49.3)
HGB BLD-MCNC: 6.7 G/DL (ref 12–17)
HGB BLD-MCNC: 7.1 G/DL (ref 12–17)
HGB BLD-MCNC: 8.7 G/DL (ref 12–17)
IMM GRANULOCYTES NFR BLD AUTO: 1 % (ref 0–2)
INR PPP: 1.34 (ref 0.84–1.19)
LACTATE SERPL-SCNC: 1.3 MMOL/L (ref 0.5–2)
LIPASE SERPL-CCNC: 23 U/L (ref 73–393)
LYMPHOCYTES NFR BLD AUTO: 10 % (ref 14–44)
MAGNESIUM SERPL-MCNC: 1.8 MG/DL (ref 1.6–2.6)
MCH RBC QN AUTO: 27.3 PG (ref 26.8–34.3)
MCHC RBC AUTO-ENTMCNC: 33.5 G/DL (ref 31.4–37.4)
MCV RBC AUTO: 82 FL (ref 82–98)
MONOCYTES NFR BLD AUTO: 11 % (ref 4–12)
NEUTS SEG NFR BLD AUTO: 76 % (ref 43–75)
NRBC BLD AUTO-RTO: 0 /100 WBCS
PLATELET # BLD AUTO: 93 THOUSANDS/UL (ref 149–390)
PMV BLD AUTO: 11.2 FL (ref 8.9–12.7)
POTASSIUM SERPL-SCNC: 2.7 MMOL/L (ref 3.5–5.3)
POTASSIUM SERPL-SCNC: 4.1 MMOL/L (ref 3.5–5.3)
PROCALCITONIN SERPL-MCNC: 7.4 NG/ML
PROCALCITONIN SERPL-MCNC: 9.59 NG/ML
PROT SERPL-MCNC: 4.6 G/DL (ref 6.4–8.2)
PROTHROMBIN TIME: 16.7 SECONDS (ref 11.6–14.5)
RBC # BLD AUTO: 2.45 MILLION/UL (ref 3.88–5.62)
SODIUM SERPL-SCNC: 129 MMOL/L (ref 136–145)
SODIUM SERPL-SCNC: 130 MMOL/L (ref 136–145)
VANCOMYCIN SERPL-MCNC: 18 UG/ML
WBC # BLD AUTO: 5.53 THOUSAND/UL (ref 4.31–10.16)

## 2020-11-03 PROCEDURE — 87040 BLOOD CULTURE FOR BACTERIA: CPT | Performed by: PHYSICIAN ASSISTANT

## 2020-11-03 PROCEDURE — 85018 HEMOGLOBIN: CPT | Performed by: PHYSICIAN ASSISTANT

## 2020-11-03 PROCEDURE — 85610 PROTHROMBIN TIME: CPT | Performed by: NURSE PRACTITIONER

## 2020-11-03 PROCEDURE — 85018 HEMOGLOBIN: CPT | Performed by: NURSE PRACTITIONER

## 2020-11-03 PROCEDURE — P9040 RBC LEUKOREDUCED IRRADIATED: HCPCS

## 2020-11-03 PROCEDURE — 85384 FIBRINOGEN ACTIVITY: CPT | Performed by: NURSE PRACTITIONER

## 2020-11-03 PROCEDURE — 74018 RADEX ABDOMEN 1 VIEW: CPT

## 2020-11-03 PROCEDURE — 99233 SBSQ HOSP IP/OBS HIGH 50: CPT | Performed by: INTERNAL MEDICINE

## 2020-11-03 PROCEDURE — C9113 INJ PANTOPRAZOLE SODIUM, VIA: HCPCS | Performed by: NURSE PRACTITIONER

## 2020-11-03 PROCEDURE — 80048 BASIC METABOLIC PNL TOTAL CA: CPT | Performed by: NURSE PRACTITIONER

## 2020-11-03 PROCEDURE — 99291 CRITICAL CARE FIRST HOUR: CPT | Performed by: PHYSICIAN ASSISTANT

## 2020-11-03 PROCEDURE — 84145 PROCALCITONIN (PCT): CPT | Performed by: NURSE PRACTITIONER

## 2020-11-03 PROCEDURE — 99233 SBSQ HOSP IP/OBS HIGH 50: CPT | Performed by: NURSE PRACTITIONER

## 2020-11-03 PROCEDURE — 99356 PR PROLONGED SVC I/P OR OBS SETTING 1ST HOUR: CPT | Performed by: NURSE PRACTITIONER

## 2020-11-03 PROCEDURE — 83690 ASSAY OF LIPASE: CPT | Performed by: PHYSICIAN ASSISTANT

## 2020-11-03 PROCEDURE — 99292 CRITICAL CARE ADDL 30 MIN: CPT | Performed by: INTERNAL MEDICINE

## 2020-11-03 PROCEDURE — 84145 PROCALCITONIN (PCT): CPT | Performed by: PHYSICIAN ASSISTANT

## 2020-11-03 PROCEDURE — 80202 ASSAY OF VANCOMYCIN: CPT | Performed by: INTERNAL MEDICINE

## 2020-11-03 PROCEDURE — 80053 COMPREHEN METABOLIC PANEL: CPT | Performed by: NURSE PRACTITIONER

## 2020-11-03 PROCEDURE — 85025 COMPLETE CBC W/AUTO DIFF WBC: CPT | Performed by: NURSE PRACTITIONER

## 2020-11-03 PROCEDURE — 85014 HEMATOCRIT: CPT | Performed by: NURSE PRACTITIONER

## 2020-11-03 PROCEDURE — 83605 ASSAY OF LACTIC ACID: CPT | Performed by: NURSE PRACTITIONER

## 2020-11-03 PROCEDURE — 83735 ASSAY OF MAGNESIUM: CPT | Performed by: PHYSICIAN ASSISTANT

## 2020-11-03 PROCEDURE — 99223 1ST HOSP IP/OBS HIGH 75: CPT | Performed by: INTERNAL MEDICINE

## 2020-11-03 RX ORDER — SIMETHICONE 20 MG/.3ML
40 EMULSION ORAL EVERY 6 HOURS PRN
Status: DISCONTINUED | OUTPATIENT
Start: 2020-11-03 | End: 2020-11-08 | Stop reason: HOSPADM

## 2020-11-03 RX ORDER — POTASSIUM CHLORIDE 29.8 MG/ML
40 INJECTION INTRAVENOUS ONCE
Status: DISCONTINUED | OUTPATIENT
Start: 2020-11-03 | End: 2020-11-04

## 2020-11-03 RX ORDER — POTASSIUM CHLORIDE 29.8 MG/ML
40 INJECTION INTRAVENOUS ONCE
Status: DISCONTINUED | OUTPATIENT
Start: 2020-11-03 | End: 2020-11-03

## 2020-11-03 RX ORDER — POTASSIUM CHLORIDE 29.8 MG/ML
40 INJECTION INTRAVENOUS ONCE
Status: COMPLETED | OUTPATIENT
Start: 2020-11-03 | End: 2020-11-04

## 2020-11-03 RX ORDER — VANCOMYCIN HYDROCHLORIDE 1 G/200ML
1000 INJECTION, SOLUTION INTRAVENOUS ONCE
Status: COMPLETED | OUTPATIENT
Start: 2020-11-03 | End: 2020-11-04

## 2020-11-03 RX ORDER — SODIUM CHLORIDE, SODIUM GLUCONATE, SODIUM ACETATE, POTASSIUM CHLORIDE, MAGNESIUM CHLORIDE, SODIUM PHOSPHATE, DIBASIC, AND POTASSIUM PHOSPHATE .53; .5; .37; .037; .03; .012; .00082 G/100ML; G/100ML; G/100ML; G/100ML; G/100ML; G/100ML; G/100ML
125 INJECTION, SOLUTION INTRAVENOUS CONTINUOUS
Status: DISCONTINUED | OUTPATIENT
Start: 2020-11-03 | End: 2020-11-04

## 2020-11-03 RX ORDER — SODIUM CHLORIDE, SODIUM GLUCONATE, SODIUM ACETATE, POTASSIUM CHLORIDE, MAGNESIUM CHLORIDE, SODIUM PHOSPHATE, DIBASIC, AND POTASSIUM PHOSPHATE .53; .5; .37; .037; .03; .012; .00082 G/100ML; G/100ML; G/100ML; G/100ML; G/100ML; G/100ML; G/100ML
500 INJECTION, SOLUTION INTRAVENOUS ONCE
Status: COMPLETED | OUTPATIENT
Start: 2020-11-03 | End: 2020-11-03

## 2020-11-03 RX ORDER — ALBUMIN (HUMAN) 12.5 G/50ML
25 SOLUTION INTRAVENOUS ONCE
Status: COMPLETED | OUTPATIENT
Start: 2020-11-03 | End: 2020-11-03

## 2020-11-03 RX ORDER — SODIUM CHLORIDE, SODIUM GLUCONATE, SODIUM ACETATE, POTASSIUM CHLORIDE, MAGNESIUM CHLORIDE, SODIUM PHOSPHATE, DIBASIC, AND POTASSIUM PHOSPHATE .53; .5; .37; .037; .03; .012; .00082 G/100ML; G/100ML; G/100ML; G/100ML; G/100ML; G/100ML; G/100ML
1000 INJECTION, SOLUTION INTRAVENOUS ONCE
Status: COMPLETED | OUTPATIENT
Start: 2020-11-03 | End: 2020-11-03

## 2020-11-03 RX ORDER — MAGNESIUM SULFATE HEPTAHYDRATE 40 MG/ML
2 INJECTION, SOLUTION INTRAVENOUS ONCE
Status: COMPLETED | OUTPATIENT
Start: 2020-11-03 | End: 2020-11-03

## 2020-11-03 RX ORDER — BUPROPION HYDROCHLORIDE 75 MG/1
150 TABLET ORAL 2 TIMES DAILY
Status: DISCONTINUED | OUTPATIENT
Start: 2020-11-03 | End: 2020-11-08 | Stop reason: HOSPADM

## 2020-11-03 RX ADMIN — SUCRALFATE 1 G: 1 TABLET ORAL at 11:57

## 2020-11-03 RX ADMIN — CLONAZEPAM 1 MG: 1 TABLET ORAL at 16:19

## 2020-11-03 RX ADMIN — OXYCODONE HYDROCHLORIDE 20 MG: 10 TABLET ORAL at 17:56

## 2020-11-03 RX ADMIN — SUCRALFATE 1 G: 1 TABLET ORAL at 23:00

## 2020-11-03 RX ADMIN — ALBUMIN (HUMAN) 25 G: 0.25 INJECTION, SOLUTION INTRAVENOUS at 01:34

## 2020-11-03 RX ADMIN — Medication 8 MG/HR: at 13:14

## 2020-11-03 RX ADMIN — ALBUMIN (HUMAN) 25 G: 0.25 INJECTION, SOLUTION INTRAVENOUS at 02:38

## 2020-11-03 RX ADMIN — SODIUM CHLORIDE, SODIUM GLUCONATE, SODIUM ACETATE, POTASSIUM CHLORIDE, MAGNESIUM CHLORIDE, SODIUM PHOSPHATE, DIBASIC, AND POTASSIUM PHOSPHATE 100 ML/HR: .53; .5; .37; .037; .03; .012; .00082 INJECTION, SOLUTION INTRAVENOUS at 08:26

## 2020-11-03 RX ADMIN — ACETAMINOPHEN 650 MG: 650 SUSPENSION ORAL at 00:51

## 2020-11-03 RX ADMIN — AMPICILLIN SODIUM 2000 MG: 2 INJECTION, POWDER, FOR SOLUTION INTRAMUSCULAR; INTRAVENOUS at 11:42

## 2020-11-03 RX ADMIN — POTASSIUM CHLORIDE 40 MEQ: 29.8 INJECTION, SOLUTION INTRAVENOUS at 08:49

## 2020-11-03 RX ADMIN — Medication 8 MG/HR: at 21:17

## 2020-11-03 RX ADMIN — SODIUM CHLORIDE, SODIUM GLUCONATE, SODIUM ACETATE, POTASSIUM CHLORIDE, MAGNESIUM CHLORIDE, SODIUM PHOSPHATE, DIBASIC, AND POTASSIUM PHOSPHATE 500 ML: .53; .5; .37; .037; .03; .012; .00082 INJECTION, SOLUTION INTRAVENOUS at 21:15

## 2020-11-03 RX ADMIN — SODIUM CHLORIDE, SODIUM LACTATE, POTASSIUM CHLORIDE, AND CALCIUM CHLORIDE 500 ML: .6; .31; .03; .02 INJECTION, SOLUTION INTRAVENOUS at 02:39

## 2020-11-03 RX ADMIN — MORPHINE SULFATE 2 MG: 2 INJECTION, SOLUTION INTRAMUSCULAR; INTRAVENOUS at 08:17

## 2020-11-03 RX ADMIN — SUCRALFATE 1 G: 1 TABLET ORAL at 16:19

## 2020-11-03 RX ADMIN — SODIUM CHLORIDE, SODIUM GLUCONATE, SODIUM ACETATE, POTASSIUM CHLORIDE, MAGNESIUM CHLORIDE, SODIUM PHOSPHATE, DIBASIC, AND POTASSIUM PHOSPHATE 1000 ML: .53; .5; .37; .037; .03; .012; .00082 INJECTION, SOLUTION INTRAVENOUS at 05:02

## 2020-11-03 RX ADMIN — SODIUM CHLORIDE, SODIUM GLUCONATE, SODIUM ACETATE, POTASSIUM CHLORIDE, MAGNESIUM CHLORIDE, SODIUM PHOSPHATE, DIBASIC, AND POTASSIUM PHOSPHATE 100 ML/HR: .53; .5; .37; .037; .03; .012; .00082 INJECTION, SOLUTION INTRAVENOUS at 15:04

## 2020-11-03 RX ADMIN — VANCOMYCIN HYDROCHLORIDE 1000 MG: 1 INJECTION, SOLUTION INTRAVENOUS at 08:17

## 2020-11-03 RX ADMIN — AMPICILLIN SODIUM 2000 MG: 2 INJECTION, POWDER, FOR SOLUTION INTRAMUSCULAR; INTRAVENOUS at 19:06

## 2020-11-03 RX ADMIN — AMPICILLIN SODIUM 2000 MG: 2 INJECTION, POWDER, FOR SOLUTION INTRAMUSCULAR; INTRAVENOUS at 23:09

## 2020-11-03 RX ADMIN — POTASSIUM CHLORIDE 40 MEQ: 29.8 INJECTION, SOLUTION INTRAVENOUS at 04:51

## 2020-11-03 RX ADMIN — OXYCODONE HYDROCHLORIDE 20 MG: 10 TABLET ORAL at 11:56

## 2020-11-03 RX ADMIN — BUPROPION HYDROCHLORIDE 150 MG: 75 TABLET ORAL at 17:53

## 2020-11-03 RX ADMIN — MAGNESIUM SULFATE IN WATER 2 G: 40 INJECTION, SOLUTION INTRAVENOUS at 08:50

## 2020-11-04 LAB
ABO GROUP BLD BPU: NORMAL
ANION GAP SERPL CALCULATED.3IONS-SCNC: 12 MMOL/L (ref 4–13)
ANION GAP SERPL CALCULATED.3IONS-SCNC: 7 MMOL/L (ref 4–13)
BASOPHILS # BLD AUTO: 0.01 THOUSANDS/ΜL (ref 0–0.1)
BASOPHILS NFR BLD AUTO: 0 % (ref 0–1)
BPU ID: NORMAL
BUN SERPL-MCNC: 12 MG/DL (ref 5–25)
BUN SERPL-MCNC: 15 MG/DL (ref 5–25)
CALCIUM SERPL-MCNC: 6.6 MG/DL (ref 8.3–10.1)
CALCIUM SERPL-MCNC: 8.1 MG/DL (ref 8.3–10.1)
CHLORIDE SERPL-SCNC: 101 MMOL/L (ref 100–108)
CHLORIDE SERPL-SCNC: 108 MMOL/L (ref 100–108)
CO2 SERPL-SCNC: 18 MMOL/L (ref 21–32)
CO2 SERPL-SCNC: 24 MMOL/L (ref 21–32)
CREAT SERPL-MCNC: 1.66 MG/DL (ref 0.6–1.3)
CREAT SERPL-MCNC: 2.26 MG/DL (ref 0.6–1.3)
CROSSMATCH: NORMAL
EOSINOPHIL # BLD AUTO: 0.14 THOUSAND/ΜL (ref 0–0.61)
EOSINOPHIL NFR BLD AUTO: 2 % (ref 0–6)
ERYTHROCYTE [DISTWIDTH] IN BLOOD BY AUTOMATED COUNT: 15.3 % (ref 11.6–15.1)
GFR SERPL CREATININE-BSD FRML MDRD: 30 ML/MIN/1.73SQ M
GFR SERPL CREATININE-BSD FRML MDRD: 44 ML/MIN/1.73SQ M
GLUCOSE SERPL-MCNC: 101 MG/DL (ref 65–140)
GLUCOSE SERPL-MCNC: 131 MG/DL (ref 65–140)
HCT VFR BLD AUTO: 25.5 % (ref 36.5–49.3)
HCT VFR BLD AUTO: 27.1 % (ref 36.5–49.3)
HCT VFR BLD AUTO: 28.3 % (ref 36.5–49.3)
HGB BLD-MCNC: 8.7 G/DL (ref 12–17)
HGB BLD-MCNC: 9.1 G/DL (ref 12–17)
HGB BLD-MCNC: 9.5 G/DL (ref 12–17)
IMM GRANULOCYTES # BLD AUTO: 0.03 THOUSAND/UL (ref 0–0.2)
IMM GRANULOCYTES NFR BLD AUTO: 1 % (ref 0–2)
INR PPP: 1.42 (ref 0.84–1.19)
LYMPHOCYTES # BLD AUTO: 0.39 THOUSANDS/ΜL (ref 0.6–4.47)
LYMPHOCYTES NFR BLD AUTO: 6 % (ref 14–44)
MAGNESIUM SERPL-MCNC: 2.3 MG/DL (ref 1.6–2.6)
MCH RBC QN AUTO: 28.2 PG (ref 26.8–34.3)
MCHC RBC AUTO-ENTMCNC: 34.1 G/DL (ref 31.4–37.4)
MCV RBC AUTO: 83 FL (ref 82–98)
MONOCYTES # BLD AUTO: 0.63 THOUSAND/ΜL (ref 0.17–1.22)
MONOCYTES NFR BLD AUTO: 10 % (ref 4–12)
NEUTROPHILS # BLD AUTO: 5.25 THOUSANDS/ΜL (ref 1.85–7.62)
NEUTS SEG NFR BLD AUTO: 81 % (ref 43–75)
NRBC BLD AUTO-RTO: 0 /100 WBCS
PLATELET # BLD AUTO: 104 THOUSANDS/UL (ref 149–390)
PMV BLD AUTO: 11 FL (ref 8.9–12.7)
POTASSIUM SERPL-SCNC: 2.8 MMOL/L (ref 3.5–5.3)
POTASSIUM SERPL-SCNC: 3.6 MMOL/L (ref 3.5–5.3)
POTASSIUM SERPL-SCNC: 4 MMOL/L (ref 3.5–5.3)
PROCALCITONIN SERPL-MCNC: 6.39 NG/ML
PROTHROMBIN TIME: 17.4 SECONDS (ref 11.6–14.5)
RBC # BLD AUTO: 3.08 MILLION/UL (ref 3.88–5.62)
SODIUM SERPL-SCNC: 132 MMOL/L (ref 136–145)
SODIUM SERPL-SCNC: 138 MMOL/L (ref 136–145)
UNIT DISPENSE STATUS: NORMAL
UNIT PRODUCT CODE: NORMAL
UNIT RH: NORMAL
WBC # BLD AUTO: 6.45 THOUSAND/UL (ref 4.31–10.16)

## 2020-11-04 PROCEDURE — 85014 HEMATOCRIT: CPT | Performed by: NURSE PRACTITIONER

## 2020-11-04 PROCEDURE — 99232 SBSQ HOSP IP/OBS MODERATE 35: CPT | Performed by: INTERNAL MEDICINE

## 2020-11-04 PROCEDURE — 99221 1ST HOSP IP/OBS SF/LOW 40: CPT | Performed by: NURSE PRACTITIONER

## 2020-11-04 PROCEDURE — 85025 COMPLETE CBC W/AUTO DIFF WBC: CPT | Performed by: INTERNAL MEDICINE

## 2020-11-04 PROCEDURE — 85018 HEMOGLOBIN: CPT | Performed by: NURSE PRACTITIONER

## 2020-11-04 PROCEDURE — 99233 SBSQ HOSP IP/OBS HIGH 50: CPT | Performed by: NURSE PRACTITIONER

## 2020-11-04 PROCEDURE — C9113 INJ PANTOPRAZOLE SODIUM, VIA: HCPCS | Performed by: NURSE PRACTITIONER

## 2020-11-04 PROCEDURE — 80048 BASIC METABOLIC PNL TOTAL CA: CPT | Performed by: NURSE PRACTITIONER

## 2020-11-04 PROCEDURE — 85610 PROTHROMBIN TIME: CPT | Performed by: NURSE PRACTITIONER

## 2020-11-04 PROCEDURE — 80048 BASIC METABOLIC PNL TOTAL CA: CPT | Performed by: INTERNAL MEDICINE

## 2020-11-04 PROCEDURE — 83735 ASSAY OF MAGNESIUM: CPT | Performed by: INTERNAL MEDICINE

## 2020-11-04 PROCEDURE — 84145 PROCALCITONIN (PCT): CPT | Performed by: INTERNAL MEDICINE

## 2020-11-04 PROCEDURE — 99291 CRITICAL CARE FIRST HOUR: CPT | Performed by: NURSE PRACTITIONER

## 2020-11-04 PROCEDURE — 84132 ASSAY OF SERUM POTASSIUM: CPT | Performed by: NURSE PRACTITIONER

## 2020-11-04 PROCEDURE — 99233 SBSQ HOSP IP/OBS HIGH 50: CPT | Performed by: INTERNAL MEDICINE

## 2020-11-04 PROCEDURE — NC001 PR NO CHARGE: Performed by: INTERNAL MEDICINE

## 2020-11-04 RX ORDER — HYOSCYAMINE SULFATE 0.12 MG/ML
0.12 LIQUID ORAL EVERY 4 HOURS PRN
Status: DISCONTINUED | OUTPATIENT
Start: 2020-11-04 | End: 2020-11-08 | Stop reason: HOSPADM

## 2020-11-04 RX ORDER — ALBUMIN (HUMAN) 12.5 G/50ML
25 SOLUTION INTRAVENOUS EVERY 8 HOURS
Status: COMPLETED | OUTPATIENT
Start: 2020-11-04 | End: 2020-11-05

## 2020-11-04 RX ORDER — CLONAZEPAM 0.5 MG/1
0.25 TABLET ORAL 2 TIMES DAILY PRN
Status: DISCONTINUED | OUTPATIENT
Start: 2020-11-04 | End: 2020-11-08 | Stop reason: HOSPADM

## 2020-11-04 RX ORDER — MIDODRINE HYDROCHLORIDE 5 MG/1
5 TABLET ORAL
Status: DISCONTINUED | OUTPATIENT
Start: 2020-11-04 | End: 2020-11-05

## 2020-11-04 RX ORDER — POTASSIUM CHLORIDE 20MEQ/15ML
40 LIQUID (ML) ORAL ONCE
Status: COMPLETED | OUTPATIENT
Start: 2020-11-04 | End: 2020-11-04

## 2020-11-04 RX ORDER — POTASSIUM CHLORIDE 29.8 MG/ML
40 INJECTION INTRAVENOUS ONCE
Status: COMPLETED | OUTPATIENT
Start: 2020-11-04 | End: 2020-11-04

## 2020-11-04 RX ORDER — CLONAZEPAM 0.5 MG/1
0.5 TABLET ORAL
Status: DISCONTINUED | OUTPATIENT
Start: 2020-11-04 | End: 2020-11-08 | Stop reason: HOSPADM

## 2020-11-04 RX ORDER — POTASSIUM CHLORIDE 14.9 MG/ML
20 INJECTION INTRAVENOUS ONCE
Status: COMPLETED | OUTPATIENT
Start: 2020-11-04 | End: 2020-11-05

## 2020-11-04 RX ORDER — PANTOPRAZOLE SODIUM 40 MG/1
40 INJECTION, POWDER, FOR SOLUTION INTRAVENOUS EVERY 12 HOURS SCHEDULED
Status: DISCONTINUED | OUTPATIENT
Start: 2020-11-04 | End: 2020-11-08 | Stop reason: HOSPADM

## 2020-11-04 RX ADMIN — AMPICILLIN SODIUM 2000 MG: 2 INJECTION, POWDER, FOR SOLUTION INTRAMUSCULAR; INTRAVENOUS at 16:07

## 2020-11-04 RX ADMIN — OXYCODONE HYDROCHLORIDE 20 MG: 10 TABLET ORAL at 09:55

## 2020-11-04 RX ADMIN — MIDODRINE HYDROCHLORIDE 5 MG: 5 TABLET ORAL at 15:28

## 2020-11-04 RX ADMIN — POTASSIUM CHLORIDE 40 MEQ: 20 SOLUTION ORAL at 05:21

## 2020-11-04 RX ADMIN — POTASSIUM CHLORIDE 40 MEQ: 20 SOLUTION ORAL at 17:00

## 2020-11-04 RX ADMIN — TRAZODONE HYDROCHLORIDE 150 MG: 100 TABLET ORAL at 21:05

## 2020-11-04 RX ADMIN — SODIUM BICARBONATE 75 ML/HR: 84 INJECTION, SOLUTION INTRAVENOUS at 19:21

## 2020-11-04 RX ADMIN — CLONAZEPAM 0.5 MG: 0.5 TABLET ORAL at 21:05

## 2020-11-04 RX ADMIN — SUCRALFATE 1 G: 1 TABLET ORAL at 07:49

## 2020-11-04 RX ADMIN — ALBUMIN (HUMAN) 25 G: 0.25 INJECTION, SOLUTION INTRAVENOUS at 17:34

## 2020-11-04 RX ADMIN — BUPROPION HYDROCHLORIDE 150 MG: 75 TABLET ORAL at 09:31

## 2020-11-04 RX ADMIN — SUCRALFATE 1 G: 1 TABLET ORAL at 15:28

## 2020-11-04 RX ADMIN — PANTOPRAZOLE SODIUM 40 MG: 40 INJECTION, POWDER, FOR SOLUTION INTRAVENOUS at 10:19

## 2020-11-04 RX ADMIN — AMPICILLIN SODIUM 2000 MG: 2 INJECTION, POWDER, FOR SOLUTION INTRAMUSCULAR; INTRAVENOUS at 10:23

## 2020-11-04 RX ADMIN — SIMETHICONE 40 MG: 20 EMULSION ORAL at 13:07

## 2020-11-04 RX ADMIN — ALBUMIN (HUMAN) 25 G: 0.25 INJECTION, SOLUTION INTRAVENOUS at 10:20

## 2020-11-04 RX ADMIN — MIDODRINE HYDROCHLORIDE 5 MG: 5 TABLET ORAL at 10:38

## 2020-11-04 RX ADMIN — AMPICILLIN SODIUM 2000 MG: 2 INJECTION, POWDER, FOR SOLUTION INTRAMUSCULAR; INTRAVENOUS at 23:32

## 2020-11-04 RX ADMIN — SODIUM CHLORIDE, SODIUM GLUCONATE, SODIUM ACETATE, POTASSIUM CHLORIDE, MAGNESIUM CHLORIDE, SODIUM PHOSPHATE, DIBASIC, AND POTASSIUM PHOSPHATE 125 ML/HR: .53; .5; .37; .037; .03; .012; .00082 INJECTION, SOLUTION INTRAVENOUS at 05:03

## 2020-11-04 RX ADMIN — OXYCODONE HYDROCHLORIDE 20 MG: 10 TABLET ORAL at 16:08

## 2020-11-04 RX ADMIN — HYOSCYAMINE SULFATE 0.12 MG: 0.12 SOLUTION/ DROPS ORAL at 13:07

## 2020-11-04 RX ADMIN — POTASSIUM CHLORIDE 40 MEQ: 29.8 INJECTION, SOLUTION INTRAVENOUS at 17:01

## 2020-11-04 RX ADMIN — SUCRALFATE 1 G: 1 TABLET ORAL at 21:05

## 2020-11-04 RX ADMIN — PANTOPRAZOLE SODIUM 40 MG: 40 INJECTION, POWDER, FOR SOLUTION INTRAVENOUS at 21:05

## 2020-11-04 RX ADMIN — BUPROPION HYDROCHLORIDE 150 MG: 75 TABLET ORAL at 17:10

## 2020-11-04 RX ADMIN — CLONAZEPAM 0.5 MG: 1 TABLET ORAL at 09:54

## 2020-11-04 RX ADMIN — AMPICILLIN SODIUM 2000 MG: 2 INJECTION, POWDER, FOR SOLUTION INTRAMUSCULAR; INTRAVENOUS at 04:28

## 2020-11-04 RX ADMIN — SUCRALFATE 1 G: 1 TABLET ORAL at 11:52

## 2020-11-04 RX ADMIN — POTASSIUM CHLORIDE 20 MEQ: 14.9 INJECTION, SOLUTION INTRAVENOUS at 21:04

## 2020-11-04 RX ADMIN — Medication 8 MG/HR: at 09:00

## 2020-11-05 LAB
ABO GROUP BLD BPU: NORMAL
ABO GROUP BLD BPU: NORMAL
ANION GAP SERPL CALCULATED.3IONS-SCNC: 10 MMOL/L (ref 4–13)
ANION GAP SERPL CALCULATED.3IONS-SCNC: 8 MMOL/L (ref 4–13)
BASOPHILS # BLD AUTO: 0.01 THOUSANDS/ΜL (ref 0–0.1)
BASOPHILS # BLD AUTO: 0.02 THOUSANDS/ΜL (ref 0–0.1)
BASOPHILS NFR BLD AUTO: 0 % (ref 0–1)
BASOPHILS NFR BLD AUTO: 0 % (ref 0–1)
BPU ID: NORMAL
BPU ID: NORMAL
BUN SERPL-MCNC: 13 MG/DL (ref 5–25)
BUN SERPL-MCNC: 13 MG/DL (ref 5–25)
CALCIUM SERPL-MCNC: 7.8 MG/DL (ref 8.3–10.1)
CALCIUM SERPL-MCNC: 8.3 MG/DL (ref 8.3–10.1)
CHLORIDE SERPL-SCNC: 102 MMOL/L (ref 100–108)
CHLORIDE SERPL-SCNC: 99 MMOL/L (ref 100–108)
CO2 SERPL-SCNC: 23 MMOL/L (ref 21–32)
CO2 SERPL-SCNC: 24 MMOL/L (ref 21–32)
CREAT SERPL-MCNC: 2.02 MG/DL (ref 0.6–1.3)
CREAT SERPL-MCNC: 2.17 MG/DL (ref 0.6–1.3)
CROSSMATCH: NORMAL
CROSSMATCH: NORMAL
EOSINOPHIL # BLD AUTO: 0.12 THOUSAND/ΜL (ref 0–0.61)
EOSINOPHIL # BLD AUTO: 0.22 THOUSAND/ΜL (ref 0–0.61)
EOSINOPHIL NFR BLD AUTO: 2 % (ref 0–6)
EOSINOPHIL NFR BLD AUTO: 3 % (ref 0–6)
ERYTHROCYTE [DISTWIDTH] IN BLOOD BY AUTOMATED COUNT: 16.1 % (ref 11.6–15.1)
ERYTHROCYTE [DISTWIDTH] IN BLOOD BY AUTOMATED COUNT: 16.2 % (ref 11.6–15.1)
FERRITIN SERPL-MCNC: 441 NG/ML (ref 8–388)
GFR SERPL CREATININE-BSD FRML MDRD: 32 ML/MIN/1.73SQ M
GFR SERPL CREATININE-BSD FRML MDRD: 35 ML/MIN/1.73SQ M
GLUCOSE SERPL-MCNC: 115 MG/DL (ref 65–140)
GLUCOSE SERPL-MCNC: 118 MG/DL (ref 65–140)
HCT VFR BLD AUTO: 24.1 % (ref 36.5–49.3)
HCT VFR BLD AUTO: 27.5 % (ref 36.5–49.3)
HGB BLD-MCNC: 7.9 G/DL (ref 12–17)
HGB BLD-MCNC: 9 G/DL (ref 12–17)
IMM GRANULOCYTES # BLD AUTO: 0.03 THOUSAND/UL (ref 0–0.2)
IMM GRANULOCYTES # BLD AUTO: 0.05 THOUSAND/UL (ref 0–0.2)
IMM GRANULOCYTES NFR BLD AUTO: 1 % (ref 0–2)
IMM GRANULOCYTES NFR BLD AUTO: 1 % (ref 0–2)
INR PPP: 1.41 (ref 0.84–1.19)
IRON SATN MFR SERPL: 10 %
IRON SERPL-MCNC: 13 UG/DL (ref 65–175)
LYMPHOCYTES # BLD AUTO: 0.41 THOUSANDS/ΜL (ref 0.6–4.47)
LYMPHOCYTES # BLD AUTO: 0.49 THOUSANDS/ΜL (ref 0.6–4.47)
LYMPHOCYTES NFR BLD AUTO: 7 % (ref 14–44)
LYMPHOCYTES NFR BLD AUTO: 8 % (ref 14–44)
MCH RBC QN AUTO: 27.4 PG (ref 26.8–34.3)
MCH RBC QN AUTO: 27.7 PG (ref 26.8–34.3)
MCHC RBC AUTO-ENTMCNC: 32.7 G/DL (ref 31.4–37.4)
MCHC RBC AUTO-ENTMCNC: 32.8 G/DL (ref 31.4–37.4)
MCV RBC AUTO: 84 FL (ref 82–98)
MCV RBC AUTO: 85 FL (ref 82–98)
MONOCYTES # BLD AUTO: 0.62 THOUSAND/ΜL (ref 0.17–1.22)
MONOCYTES # BLD AUTO: 0.64 THOUSAND/ΜL (ref 0.17–1.22)
MONOCYTES NFR BLD AUTO: 10 % (ref 4–12)
MONOCYTES NFR BLD AUTO: 11 % (ref 4–12)
NEUTROPHILS # BLD AUTO: 4.41 THOUSANDS/ΜL (ref 1.85–7.62)
NEUTROPHILS # BLD AUTO: 5.1 THOUSANDS/ΜL (ref 1.85–7.62)
NEUTS SEG NFR BLD AUTO: 78 % (ref 43–75)
NEUTS SEG NFR BLD AUTO: 79 % (ref 43–75)
NRBC BLD AUTO-RTO: 0 /100 WBCS
NRBC BLD AUTO-RTO: 0 /100 WBCS
PLATELET # BLD AUTO: 107 THOUSANDS/UL (ref 149–390)
PLATELET # BLD AUTO: 143 THOUSANDS/UL (ref 149–390)
PMV BLD AUTO: 11.1 FL (ref 8.9–12.7)
PMV BLD AUTO: 11.1 FL (ref 8.9–12.7)
POTASSIUM SERPL-SCNC: 3.9 MMOL/L (ref 3.5–5.3)
POTASSIUM SERPL-SCNC: 4.2 MMOL/L (ref 3.5–5.3)
PROTHROMBIN TIME: 17.4 SECONDS (ref 11.6–14.5)
RBC # BLD AUTO: 2.88 MILLION/UL (ref 3.88–5.62)
RBC # BLD AUTO: 3.25 MILLION/UL (ref 3.88–5.62)
SODIUM SERPL-SCNC: 132 MMOL/L (ref 136–145)
SODIUM SERPL-SCNC: 134 MMOL/L (ref 136–145)
TIBC SERPL-MCNC: 132 UG/DL (ref 250–450)
UNIT DISPENSE STATUS: NORMAL
UNIT DISPENSE STATUS: NORMAL
UNIT PRODUCT CODE: NORMAL
UNIT PRODUCT CODE: NORMAL
UNIT RH: NORMAL
UNIT RH: NORMAL
WBC # BLD AUTO: 5.62 THOUSAND/UL (ref 4.31–10.16)
WBC # BLD AUTO: 6.5 THOUSAND/UL (ref 4.31–10.16)

## 2020-11-05 PROCEDURE — 85025 COMPLETE CBC W/AUTO DIFF WBC: CPT | Performed by: INTERNAL MEDICINE

## 2020-11-05 PROCEDURE — 99233 SBSQ HOSP IP/OBS HIGH 50: CPT | Performed by: INTERNAL MEDICINE

## 2020-11-05 PROCEDURE — 99232 SBSQ HOSP IP/OBS MODERATE 35: CPT | Performed by: INTERNAL MEDICINE

## 2020-11-05 PROCEDURE — 99232 SBSQ HOSP IP/OBS MODERATE 35: CPT | Performed by: NURSE PRACTITIONER

## 2020-11-05 PROCEDURE — 85610 PROTHROMBIN TIME: CPT | Performed by: INTERNAL MEDICINE

## 2020-11-05 PROCEDURE — 82728 ASSAY OF FERRITIN: CPT | Performed by: INTERNAL MEDICINE

## 2020-11-05 PROCEDURE — C9113 INJ PANTOPRAZOLE SODIUM, VIA: HCPCS | Performed by: NURSE PRACTITIONER

## 2020-11-05 PROCEDURE — 83540 ASSAY OF IRON: CPT | Performed by: INTERNAL MEDICINE

## 2020-11-05 PROCEDURE — 83010 ASSAY OF HAPTOGLOBIN QUANT: CPT | Performed by: INTERNAL MEDICINE

## 2020-11-05 PROCEDURE — 80048 BASIC METABOLIC PNL TOTAL CA: CPT | Performed by: INTERNAL MEDICINE

## 2020-11-05 PROCEDURE — 83550 IRON BINDING TEST: CPT | Performed by: INTERNAL MEDICINE

## 2020-11-05 RX ORDER — ALBUMIN (HUMAN) 12.5 G/50ML
25 SOLUTION INTRAVENOUS ONCE
Status: COMPLETED | OUTPATIENT
Start: 2020-11-05 | End: 2020-11-05

## 2020-11-05 RX ORDER — MIDODRINE HYDROCHLORIDE 5 MG/1
10 TABLET ORAL
Status: DISCONTINUED | OUTPATIENT
Start: 2020-11-05 | End: 2020-11-08 | Stop reason: HOSPADM

## 2020-11-05 RX ADMIN — AMPICILLIN SODIUM 2000 MG: 2 INJECTION, POWDER, FOR SOLUTION INTRAMUSCULAR; INTRAVENOUS at 16:18

## 2020-11-05 RX ADMIN — AMPICILLIN SODIUM 2000 MG: 2 INJECTION, POWDER, FOR SOLUTION INTRAMUSCULAR; INTRAVENOUS at 11:00

## 2020-11-05 RX ADMIN — SUCRALFATE 1 G: 1 TABLET ORAL at 22:33

## 2020-11-05 RX ADMIN — CLONAZEPAM 0.25 MG: 0.5 TABLET ORAL at 12:29

## 2020-11-05 RX ADMIN — Medication 125 MG: at 10:00

## 2020-11-05 RX ADMIN — ALBUMIN (HUMAN) 25 G: 0.25 INJECTION, SOLUTION INTRAVENOUS at 19:00

## 2020-11-05 RX ADMIN — PANTOPRAZOLE SODIUM 40 MG: 40 INJECTION, POWDER, FOR SOLUTION INTRAVENOUS at 09:09

## 2020-11-05 RX ADMIN — FENTANYL TRANSDERMAL 1 PATCH: 100 PATCH, EXTENDED RELEASE TRANSDERMAL at 09:08

## 2020-11-05 RX ADMIN — MIDODRINE HYDROCHLORIDE 5 MG: 5 TABLET ORAL at 07:36

## 2020-11-05 RX ADMIN — AMPICILLIN SODIUM 2000 MG: 2 INJECTION, POWDER, FOR SOLUTION INTRAMUSCULAR; INTRAVENOUS at 22:33

## 2020-11-05 RX ADMIN — CLONAZEPAM 0.5 MG: 0.5 TABLET ORAL at 22:33

## 2020-11-05 RX ADMIN — ALBUMIN (HUMAN) 25 G: 0.25 INJECTION, SOLUTION INTRAVENOUS at 04:21

## 2020-11-05 RX ADMIN — OXYCODONE HYDROCHLORIDE 20 MG: 10 TABLET ORAL at 10:54

## 2020-11-05 RX ADMIN — PANTOPRAZOLE SODIUM 40 MG: 40 INJECTION, POWDER, FOR SOLUTION INTRAVENOUS at 22:33

## 2020-11-05 RX ADMIN — SUCRALFATE 1 G: 1 TABLET ORAL at 16:13

## 2020-11-05 RX ADMIN — BUPROPION HYDROCHLORIDE 150 MG: 75 TABLET ORAL at 09:09

## 2020-11-05 RX ADMIN — SIMETHICONE 40 MG: 20 EMULSION ORAL at 12:23

## 2020-11-05 RX ADMIN — OXYCODONE HYDROCHLORIDE 20 MG: 10 TABLET ORAL at 00:29

## 2020-11-05 RX ADMIN — ALBUMIN (HUMAN) 25 G: 0.25 INJECTION, SOLUTION INTRAVENOUS at 09:00

## 2020-11-05 RX ADMIN — AMPICILLIN SODIUM 2000 MG: 2 INJECTION, POWDER, FOR SOLUTION INTRAMUSCULAR; INTRAVENOUS at 05:08

## 2020-11-05 RX ADMIN — RIVAROXABAN 10 MG: 10 TABLET, FILM COATED ORAL at 16:13

## 2020-11-05 RX ADMIN — HYOSCYAMINE SULFATE 0.12 MG: 0.12 SOLUTION/ DROPS ORAL at 12:22

## 2020-11-05 RX ADMIN — Medication 125 MG: at 22:33

## 2020-11-05 RX ADMIN — MIDODRINE HYDROCHLORIDE 10 MG: 5 TABLET ORAL at 10:54

## 2020-11-05 RX ADMIN — SUCRALFATE 1 G: 1 TABLET ORAL at 07:36

## 2020-11-05 RX ADMIN — IRON SUCROSE 300 MG: 20 INJECTION, SOLUTION INTRAVENOUS at 16:13

## 2020-11-05 RX ADMIN — BUPROPION HYDROCHLORIDE 150 MG: 75 TABLET ORAL at 17:21

## 2020-11-05 RX ADMIN — MIDODRINE HYDROCHLORIDE 10 MG: 5 TABLET ORAL at 16:13

## 2020-11-05 RX ADMIN — FENTANYL 50 MCG: 50 PATCH TRANSDERMAL at 09:17

## 2020-11-05 RX ADMIN — TRAZODONE HYDROCHLORIDE 150 MG: 100 TABLET ORAL at 22:33

## 2020-11-05 RX ADMIN — SUCRALFATE 1 G: 1 TABLET ORAL at 10:55

## 2020-11-06 ENCOUNTER — APPOINTMENT (INPATIENT)
Dept: RADIOLOGY | Facility: HOSPITAL | Age: 60
DRG: 374 | End: 2020-11-06
Payer: MEDICARE

## 2020-11-06 DIAGNOSIS — Z86.711 HISTORY OF PULMONARY EMBOLISM: Primary | Chronic | ICD-10-CM

## 2020-11-06 DIAGNOSIS — F32.9 REACTIVE DEPRESSION: ICD-10-CM

## 2020-11-06 LAB
ANION GAP SERPL CALCULATED.3IONS-SCNC: 6 MMOL/L (ref 4–13)
ANION GAP SERPL CALCULATED.3IONS-SCNC: 9 MMOL/L (ref 4–13)
BACTERIA BLD CULT: NORMAL
BASOPHILS # BLD AUTO: 0.02 THOUSANDS/ΜL (ref 0–0.1)
BASOPHILS NFR BLD AUTO: 0 % (ref 0–1)
BUN SERPL-MCNC: 12 MG/DL (ref 5–25)
BUN SERPL-MCNC: 14 MG/DL (ref 5–25)
CALCIUM SERPL-MCNC: 7.9 MG/DL (ref 8.3–10.1)
CALCIUM SERPL-MCNC: 8.3 MG/DL (ref 8.3–10.1)
CHLORIDE SERPL-SCNC: 100 MMOL/L (ref 100–108)
CHLORIDE SERPL-SCNC: 102 MMOL/L (ref 100–108)
CO2 SERPL-SCNC: 23 MMOL/L (ref 21–32)
CO2 SERPL-SCNC: 26 MMOL/L (ref 21–32)
CREAT SERPL-MCNC: 1.87 MG/DL (ref 0.6–1.3)
CREAT SERPL-MCNC: 1.93 MG/DL (ref 0.6–1.3)
EOSINOPHIL # BLD AUTO: 0.24 THOUSAND/ΜL (ref 0–0.61)
EOSINOPHIL NFR BLD AUTO: 4 % (ref 0–6)
ERYTHROCYTE [DISTWIDTH] IN BLOOD BY AUTOMATED COUNT: 16.6 % (ref 11.6–15.1)
GFR SERPL CREATININE-BSD FRML MDRD: 37 ML/MIN/1.73SQ M
GFR SERPL CREATININE-BSD FRML MDRD: 38 ML/MIN/1.73SQ M
GLUCOSE SERPL-MCNC: 109 MG/DL (ref 65–140)
GLUCOSE SERPL-MCNC: 130 MG/DL (ref 65–140)
HAPTOGLOB SERPL-MCNC: 167 MG/DL (ref 29–370)
HCT VFR BLD AUTO: 26.1 % (ref 36.5–49.3)
HGB BLD-MCNC: 8.5 G/DL (ref 12–17)
IMM GRANULOCYTES # BLD AUTO: 0.11 THOUSAND/UL (ref 0–0.2)
IMM GRANULOCYTES NFR BLD AUTO: 2 % (ref 0–2)
LYMPHOCYTES # BLD AUTO: 0.45 THOUSANDS/ΜL (ref 0.6–4.47)
LYMPHOCYTES NFR BLD AUTO: 8 % (ref 14–44)
MAGNESIUM SERPL-MCNC: 1.7 MG/DL (ref 1.6–2.6)
MAGNESIUM SERPL-MCNC: 2.1 MG/DL (ref 1.6–2.6)
MCH RBC QN AUTO: 28 PG (ref 26.8–34.3)
MCHC RBC AUTO-ENTMCNC: 32.6 G/DL (ref 31.4–37.4)
MCV RBC AUTO: 86 FL (ref 82–98)
MONOCYTES # BLD AUTO: 0.69 THOUSAND/ΜL (ref 0.17–1.22)
MONOCYTES NFR BLD AUTO: 12 % (ref 4–12)
NEUTROPHILS # BLD AUTO: 4.38 THOUSANDS/ΜL (ref 1.85–7.62)
NEUTS SEG NFR BLD AUTO: 74 % (ref 43–75)
NRBC BLD AUTO-RTO: 0 /100 WBCS
PHOSPHATE SERPL-MCNC: 2.4 MG/DL (ref 2.3–4.1)
PLATELET # BLD AUTO: 149 THOUSANDS/UL (ref 149–390)
PMV BLD AUTO: 10.8 FL (ref 8.9–12.7)
POTASSIUM SERPL-SCNC: 3.7 MMOL/L (ref 3.5–5.3)
POTASSIUM SERPL-SCNC: 3.8 MMOL/L (ref 3.5–5.3)
RBC # BLD AUTO: 3.04 MILLION/UL (ref 3.88–5.62)
SODIUM SERPL-SCNC: 132 MMOL/L (ref 136–145)
SODIUM SERPL-SCNC: 134 MMOL/L (ref 136–145)
WBC # BLD AUTO: 5.89 THOUSAND/UL (ref 4.31–10.16)

## 2020-11-06 PROCEDURE — 99232 SBSQ HOSP IP/OBS MODERATE 35: CPT | Performed by: INTERNAL MEDICINE

## 2020-11-06 PROCEDURE — 99232 SBSQ HOSP IP/OBS MODERATE 35: CPT | Performed by: NURSE PRACTITIONER

## 2020-11-06 PROCEDURE — 94760 N-INVAS EAR/PLS OXIMETRY 1: CPT

## 2020-11-06 PROCEDURE — 99233 SBSQ HOSP IP/OBS HIGH 50: CPT | Performed by: INTERNAL MEDICINE

## 2020-11-06 PROCEDURE — 80048 BASIC METABOLIC PNL TOTAL CA: CPT | Performed by: INTERNAL MEDICINE

## 2020-11-06 PROCEDURE — 83735 ASSAY OF MAGNESIUM: CPT | Performed by: INTERNAL MEDICINE

## 2020-11-06 PROCEDURE — 94640 AIRWAY INHALATION TREATMENT: CPT

## 2020-11-06 PROCEDURE — 80048 BASIC METABOLIC PNL TOTAL CA: CPT | Performed by: PHYSICIAN ASSISTANT

## 2020-11-06 PROCEDURE — 94002 VENT MGMT INPAT INIT DAY: CPT

## 2020-11-06 PROCEDURE — C9113 INJ PANTOPRAZOLE SODIUM, VIA: HCPCS | Performed by: NURSE PRACTITIONER

## 2020-11-06 PROCEDURE — 84100 ASSAY OF PHOSPHORUS: CPT | Performed by: INTERNAL MEDICINE

## 2020-11-06 PROCEDURE — 94664 DEMO&/EVAL PT USE INHALER: CPT

## 2020-11-06 PROCEDURE — 71045 X-RAY EXAM CHEST 1 VIEW: CPT

## 2020-11-06 PROCEDURE — 94762 N-INVAS EAR/PLS OXIMTRY CONT: CPT

## 2020-11-06 PROCEDURE — 85025 COMPLETE CBC W/AUTO DIFF WBC: CPT | Performed by: INTERNAL MEDICINE

## 2020-11-06 PROCEDURE — 83735 ASSAY OF MAGNESIUM: CPT | Performed by: PHYSICIAN ASSISTANT

## 2020-11-06 RX ORDER — POTASSIUM CHLORIDE 20 MEQ/1
20 TABLET, EXTENDED RELEASE ORAL ONCE
Status: DISCONTINUED | OUTPATIENT
Start: 2020-11-06 | End: 2020-11-06

## 2020-11-06 RX ORDER — MAGNESIUM SULFATE HEPTAHYDRATE 40 MG/ML
2 INJECTION, SOLUTION INTRAVENOUS ONCE
Status: COMPLETED | OUTPATIENT
Start: 2020-11-06 | End: 2020-11-07

## 2020-11-06 RX ORDER — LACTULOSE 20 G/30ML
20 SOLUTION ORAL ONCE
Status: COMPLETED | OUTPATIENT
Start: 2020-11-06 | End: 2020-11-06

## 2020-11-06 RX ORDER — FUROSEMIDE 10 MG/ML
40 INJECTION INTRAMUSCULAR; INTRAVENOUS ONCE
Status: COMPLETED | OUTPATIENT
Start: 2020-11-06 | End: 2020-11-06

## 2020-11-06 RX ORDER — POTASSIUM CHLORIDE 20 MEQ/1
40 TABLET, EXTENDED RELEASE ORAL ONCE
Status: COMPLETED | OUTPATIENT
Start: 2020-11-06 | End: 2020-11-06

## 2020-11-06 RX ORDER — SODIUM CHLORIDE FOR INHALATION 0.9 %
VIAL, NEBULIZER (ML) INHALATION
Status: DISPENSED
Start: 2020-11-06 | End: 2020-11-07

## 2020-11-06 RX ORDER — LEVALBUTEROL 1.25 MG/.5ML
1.25 SOLUTION, CONCENTRATE RESPIRATORY (INHALATION) EVERY 6 HOURS PRN
Status: DISCONTINUED | OUTPATIENT
Start: 2020-11-06 | End: 2020-11-08 | Stop reason: HOSPADM

## 2020-11-06 RX ORDER — BUPROPION HYDROCHLORIDE 100 MG/1
150 TABLET ORAL 2 TIMES DAILY
Qty: 90 TABLET | Refills: 0 | Status: SHIPPED | OUTPATIENT
Start: 2020-11-06 | End: 2020-11-10

## 2020-11-06 RX ORDER — MAGNESIUM SULFATE HEPTAHYDRATE 40 MG/ML
2 INJECTION, SOLUTION INTRAVENOUS ONCE
Status: DISCONTINUED | OUTPATIENT
Start: 2020-11-06 | End: 2020-11-08 | Stop reason: HOSPADM

## 2020-11-06 RX ORDER — PROCHLORPERAZINE MALEATE 10 MG
5 TABLET ORAL ONCE
Status: COMPLETED | OUTPATIENT
Start: 2020-11-06 | End: 2020-11-06

## 2020-11-06 RX ADMIN — CLONAZEPAM 0.5 MG: 0.5 TABLET ORAL at 22:04

## 2020-11-06 RX ADMIN — SUCRALFATE 1 G: 1 TABLET ORAL at 16:00

## 2020-11-06 RX ADMIN — LACTULOSE 20 G: 10 SOLUTION ORAL at 18:07

## 2020-11-06 RX ADMIN — AMPICILLIN SODIUM 2000 MG: 2 INJECTION, POWDER, FOR SOLUTION INTRAMUSCULAR; INTRAVENOUS at 05:43

## 2020-11-06 RX ADMIN — PANTOPRAZOLE SODIUM 40 MG: 40 INJECTION, POWDER, FOR SOLUTION INTRAVENOUS at 09:17

## 2020-11-06 RX ADMIN — MIDODRINE HYDROCHLORIDE 10 MG: 5 TABLET ORAL at 09:16

## 2020-11-06 RX ADMIN — MIDODRINE HYDROCHLORIDE 10 MG: 5 TABLET ORAL at 16:00

## 2020-11-06 RX ADMIN — BUPROPION HYDROCHLORIDE 150 MG: 75 TABLET ORAL at 09:17

## 2020-11-06 RX ADMIN — LACTULOSE 20 G: 10 SOLUTION ORAL at 12:46

## 2020-11-06 RX ADMIN — Medication 125 MG: at 09:17

## 2020-11-06 RX ADMIN — LEVALBUTEROL HYDROCHLORIDE 1.25 MG: 1.25 SOLUTION, CONCENTRATE RESPIRATORY (INHALATION) at 16:28

## 2020-11-06 RX ADMIN — FUROSEMIDE 40 MG: 10 INJECTION, SOLUTION INTRAMUSCULAR; INTRAVENOUS at 03:17

## 2020-11-06 RX ADMIN — AMPICILLIN SODIUM 2000 MG: 2 INJECTION, POWDER, FOR SOLUTION INTRAMUSCULAR; INTRAVENOUS at 22:45

## 2020-11-06 RX ADMIN — OXYCODONE HYDROCHLORIDE 20 MG: 10 TABLET ORAL at 22:03

## 2020-11-06 RX ADMIN — FUROSEMIDE 40 MG: 10 INJECTION, SOLUTION INTRAMUSCULAR; INTRAVENOUS at 15:59

## 2020-11-06 RX ADMIN — POTASSIUM CHLORIDE 40 MEQ: 1500 TABLET, EXTENDED RELEASE ORAL at 12:54

## 2020-11-06 RX ADMIN — AMPICILLIN SODIUM 2000 MG: 2 INJECTION, POWDER, FOR SOLUTION INTRAMUSCULAR; INTRAVENOUS at 15:59

## 2020-11-06 RX ADMIN — OXYCODONE HYDROCHLORIDE 20 MG: 10 TABLET ORAL at 16:07

## 2020-11-06 RX ADMIN — LEVALBUTEROL HYDROCHLORIDE 1.25 MG: 1.25 SOLUTION, CONCENTRATE RESPIRATORY (INHALATION) at 02:48

## 2020-11-06 RX ADMIN — OXYCODONE HYDROCHLORIDE 20 MG: 10 TABLET ORAL at 05:43

## 2020-11-06 RX ADMIN — MIDODRINE HYDROCHLORIDE 10 MG: 5 TABLET ORAL at 13:00

## 2020-11-06 RX ADMIN — POTASSIUM CHLORIDE 40 MEQ: 1500 TABLET, EXTENDED RELEASE ORAL at 15:59

## 2020-11-06 RX ADMIN — MAGNESIUM SULFATE HEPTAHYDRATE 2 G: 40 INJECTION, SOLUTION INTRAVENOUS at 12:53

## 2020-11-06 RX ADMIN — TRAZODONE HYDROCHLORIDE 150 MG: 100 TABLET ORAL at 22:04

## 2020-11-06 RX ADMIN — PANTOPRAZOLE SODIUM 40 MG: 40 INJECTION, POWDER, FOR SOLUTION INTRAVENOUS at 22:00

## 2020-11-06 RX ADMIN — AMPICILLIN SODIUM 2000 MG: 2 INJECTION, POWDER, FOR SOLUTION INTRAMUSCULAR; INTRAVENOUS at 12:52

## 2020-11-06 RX ADMIN — Medication 125 MG: at 21:00

## 2020-11-06 RX ADMIN — SUCRALFATE 1 G: 1 TABLET ORAL at 22:04

## 2020-11-06 RX ADMIN — PROCHLORPERAZINE MALEATE 5 MG: 10 TABLET ORAL at 19:15

## 2020-11-06 RX ADMIN — RIVAROXABAN 10 MG: 10 TABLET, FILM COATED ORAL at 16:00

## 2020-11-06 RX ADMIN — BUPROPION HYDROCHLORIDE 150 MG: 75 TABLET ORAL at 18:07

## 2020-11-06 RX ADMIN — SUCRALFATE 1 G: 1 TABLET ORAL at 12:54

## 2020-11-06 RX ADMIN — LEVALBUTEROL HYDROCHLORIDE 1.25 MG: 1.25 SOLUTION, CONCENTRATE RESPIRATORY (INHALATION) at 22:57

## 2020-11-06 RX ADMIN — ONDANSETRON 4 MG: 2 INJECTION INTRAMUSCULAR; INTRAVENOUS at 17:44

## 2020-11-06 RX ADMIN — SUCRALFATE 1 G: 1 TABLET ORAL at 09:16

## 2020-11-07 ENCOUNTER — APPOINTMENT (INPATIENT)
Dept: RADIOLOGY | Facility: HOSPITAL | Age: 60
DRG: 374 | End: 2020-11-07
Payer: MEDICARE

## 2020-11-07 LAB
ANION GAP SERPL CALCULATED.3IONS-SCNC: 11 MMOL/L (ref 4–13)
BUN SERPL-MCNC: 15 MG/DL (ref 5–25)
CALCIUM SERPL-MCNC: 8.4 MG/DL (ref 8.3–10.1)
CHLORIDE SERPL-SCNC: 104 MMOL/L (ref 100–108)
CO2 SERPL-SCNC: 23 MMOL/L (ref 21–32)
CREAT SERPL-MCNC: 1.96 MG/DL (ref 0.6–1.3)
ERYTHROCYTE [DISTWIDTH] IN BLOOD BY AUTOMATED COUNT: 17.2 % (ref 11.6–15.1)
GFR SERPL CREATININE-BSD FRML MDRD: 36 ML/MIN/1.73SQ M
GLUCOSE SERPL-MCNC: 129 MG/DL (ref 65–140)
HCT VFR BLD AUTO: 28.2 % (ref 36.5–49.3)
HGB BLD-MCNC: 8.8 G/DL (ref 12–17)
MAGNESIUM SERPL-MCNC: 1.8 MG/DL (ref 1.6–2.6)
MCH RBC QN AUTO: 28.6 PG (ref 26.8–34.3)
MCHC RBC AUTO-ENTMCNC: 31.2 G/DL (ref 31.4–37.4)
MCV RBC AUTO: 92 FL (ref 82–98)
PHOSPHATE SERPL-MCNC: 2.9 MG/DL (ref 2.3–4.1)
PLATELET # BLD AUTO: 195 THOUSANDS/UL (ref 149–390)
PMV BLD AUTO: 10.6 FL (ref 8.9–12.7)
POTASSIUM SERPL-SCNC: 3.9 MMOL/L (ref 3.5–5.3)
RBC # BLD AUTO: 3.08 MILLION/UL (ref 3.88–5.62)
SODIUM SERPL-SCNC: 138 MMOL/L (ref 136–145)
WBC # BLD AUTO: 7.33 THOUSAND/UL (ref 4.31–10.16)

## 2020-11-07 PROCEDURE — 83735 ASSAY OF MAGNESIUM: CPT | Performed by: PHYSICIAN ASSISTANT

## 2020-11-07 PROCEDURE — 94003 VENT MGMT INPAT SUBQ DAY: CPT

## 2020-11-07 PROCEDURE — 99233 SBSQ HOSP IP/OBS HIGH 50: CPT | Performed by: INTERNAL MEDICINE

## 2020-11-07 PROCEDURE — 71045 X-RAY EXAM CHEST 1 VIEW: CPT

## 2020-11-07 PROCEDURE — 94640 AIRWAY INHALATION TREATMENT: CPT

## 2020-11-07 PROCEDURE — 94760 N-INVAS EAR/PLS OXIMETRY 1: CPT

## 2020-11-07 PROCEDURE — 85027 COMPLETE CBC AUTOMATED: CPT | Performed by: PHYSICIAN ASSISTANT

## 2020-11-07 PROCEDURE — C9113 INJ PANTOPRAZOLE SODIUM, VIA: HCPCS | Performed by: NURSE PRACTITIONER

## 2020-11-07 PROCEDURE — 80048 BASIC METABOLIC PNL TOTAL CA: CPT | Performed by: PHYSICIAN ASSISTANT

## 2020-11-07 PROCEDURE — 84100 ASSAY OF PHOSPHORUS: CPT | Performed by: PHYSICIAN ASSISTANT

## 2020-11-07 RX ORDER — POTASSIUM CHLORIDE 20 MEQ/1
20 TABLET, EXTENDED RELEASE ORAL ONCE
Status: COMPLETED | OUTPATIENT
Start: 2020-11-07 | End: 2020-11-07

## 2020-11-07 RX ORDER — FUROSEMIDE 10 MG/ML
60 INJECTION INTRAMUSCULAR; INTRAVENOUS ONCE
Status: DISCONTINUED | OUTPATIENT
Start: 2020-11-07 | End: 2020-11-08 | Stop reason: HOSPADM

## 2020-11-07 RX ORDER — ALBUMIN (HUMAN) 12.5 G/50ML
25 SOLUTION INTRAVENOUS ONCE
Status: COMPLETED | OUTPATIENT
Start: 2020-11-07 | End: 2020-11-07

## 2020-11-07 RX ADMIN — OXYCODONE HYDROCHLORIDE 20 MG: 10 TABLET ORAL at 17:19

## 2020-11-07 RX ADMIN — OXYCODONE HYDROCHLORIDE 20 MG: 10 TABLET ORAL at 23:50

## 2020-11-07 RX ADMIN — ALBUMIN (HUMAN) 25 G: 0.25 INJECTION, SOLUTION INTRAVENOUS at 07:42

## 2020-11-07 RX ADMIN — AMPICILLIN SODIUM 2000 MG: 2 INJECTION, POWDER, FOR SOLUTION INTRAMUSCULAR; INTRAVENOUS at 04:36

## 2020-11-07 RX ADMIN — HYOSCYAMINE SULFATE 0.12 MG: 0.12 SOLUTION/ DROPS ORAL at 13:12

## 2020-11-07 RX ADMIN — SUCRALFATE 1 G: 1 TABLET ORAL at 13:11

## 2020-11-07 RX ADMIN — OXYCODONE HYDROCHLORIDE 20 MG: 10 TABLET ORAL at 04:36

## 2020-11-07 RX ADMIN — MIDODRINE HYDROCHLORIDE 10 MG: 5 TABLET ORAL at 07:42

## 2020-11-07 RX ADMIN — TRAZODONE HYDROCHLORIDE 150 MG: 100 TABLET ORAL at 21:32

## 2020-11-07 RX ADMIN — MIDODRINE HYDROCHLORIDE 10 MG: 5 TABLET ORAL at 17:24

## 2020-11-07 RX ADMIN — POTASSIUM CHLORIDE 20 MEQ: 1500 TABLET, EXTENDED RELEASE ORAL at 13:11

## 2020-11-07 RX ADMIN — LEVALBUTEROL HYDROCHLORIDE 1.25 MG: 1.25 SOLUTION, CONCENTRATE RESPIRATORY (INHALATION) at 05:07

## 2020-11-07 RX ADMIN — Medication 125 MG: at 21:40

## 2020-11-07 RX ADMIN — AMPICILLIN SODIUM 2000 MG: 2 INJECTION, POWDER, FOR SOLUTION INTRAMUSCULAR; INTRAVENOUS at 13:10

## 2020-11-07 RX ADMIN — MIDODRINE HYDROCHLORIDE 10 MG: 5 TABLET ORAL at 13:11

## 2020-11-07 RX ADMIN — BUPROPION HYDROCHLORIDE 150 MG: 75 TABLET ORAL at 18:47

## 2020-11-07 RX ADMIN — AMPICILLIN SODIUM 2000 MG: 2 INJECTION, POWDER, FOR SOLUTION INTRAMUSCULAR; INTRAVENOUS at 18:47

## 2020-11-07 RX ADMIN — SIMETHICONE 40 MG: 20 EMULSION ORAL at 13:12

## 2020-11-07 RX ADMIN — BUPROPION HYDROCHLORIDE 150 MG: 75 TABLET ORAL at 08:53

## 2020-11-07 RX ADMIN — PANTOPRAZOLE SODIUM 40 MG: 40 INJECTION, POWDER, FOR SOLUTION INTRAVENOUS at 08:54

## 2020-11-07 RX ADMIN — SUCRALFATE 1 G: 1 TABLET ORAL at 07:42

## 2020-11-07 RX ADMIN — RIVAROXABAN 10 MG: 10 TABLET, FILM COATED ORAL at 17:24

## 2020-11-07 RX ADMIN — PANTOPRAZOLE SODIUM 40 MG: 40 INJECTION, POWDER, FOR SOLUTION INTRAVENOUS at 21:32

## 2020-11-07 RX ADMIN — ONDANSETRON 4 MG: 2 INJECTION INTRAMUSCULAR; INTRAVENOUS at 04:36

## 2020-11-07 RX ADMIN — AMPICILLIN SODIUM 2000 MG: 2 INJECTION, POWDER, FOR SOLUTION INTRAMUSCULAR; INTRAVENOUS at 23:50

## 2020-11-07 RX ADMIN — SUCRALFATE 1 G: 1 TABLET ORAL at 21:32

## 2020-11-07 RX ADMIN — SUCRALFATE 1 G: 1 TABLET ORAL at 17:24

## 2020-11-07 RX ADMIN — Medication 125 MG: at 08:53

## 2020-11-07 RX ADMIN — CLONAZEPAM 0.5 MG: 0.5 TABLET ORAL at 21:32

## 2020-11-08 ENCOUNTER — APPOINTMENT (INPATIENT)
Dept: RADIOLOGY | Facility: HOSPITAL | Age: 60
DRG: 374 | End: 2020-11-08
Payer: MEDICARE

## 2020-11-08 VITALS
WEIGHT: 169.6 LBS | RESPIRATION RATE: 16 BRPM | BODY MASS INDEX: 22.97 KG/M2 | SYSTOLIC BLOOD PRESSURE: 107 MMHG | OXYGEN SATURATION: 97 % | TEMPERATURE: 98 F | DIASTOLIC BLOOD PRESSURE: 54 MMHG | HEIGHT: 72 IN | HEART RATE: 71 BPM

## 2020-11-08 PROBLEM — R78.81 GRAM-POSITIVE BACTEREMIA: Status: RESOLVED | Noted: 2020-11-01 | Resolved: 2020-11-08

## 2020-11-08 PROBLEM — N17.0 ACUTE KIDNEY INJURY (AKI) WITH ACUTE TUBULAR NECROSIS (ATN) (HCC): Status: ACTIVE | Noted: 2020-11-08

## 2020-11-08 PROBLEM — J45.20 MILD INTERMITTENT ASTHMA WITHOUT COMPLICATION: Status: ACTIVE | Noted: 2020-11-08

## 2020-11-08 PROBLEM — B95.2 ENTEROCOCCUS FAECALIS INFECTION: Status: ACTIVE | Noted: 2020-11-08

## 2020-11-08 LAB
ALBUMIN SERPL BCP-MCNC: 2.4 G/DL (ref 3.5–5)
ALP SERPL-CCNC: 103 U/L (ref 46–116)
ALT SERPL W P-5'-P-CCNC: 19 U/L (ref 12–78)
ANION GAP SERPL CALCULATED.3IONS-SCNC: 6 MMOL/L (ref 4–13)
AST SERPL W P-5'-P-CCNC: 8 U/L (ref 5–45)
BACTERIA BLD CULT: NORMAL
BACTERIA BLD CULT: NORMAL
BASOPHILS # BLD AUTO: 0.03 THOUSANDS/ΜL (ref 0–0.1)
BASOPHILS NFR BLD AUTO: 0 % (ref 0–1)
BILIRUB SERPL-MCNC: 0.67 MG/DL (ref 0.2–1)
BUN SERPL-MCNC: 14 MG/DL (ref 5–25)
CALCIUM ALBUM COR SERPL-MCNC: 9.5 MG/DL (ref 8.3–10.1)
CALCIUM SERPL-MCNC: 8.2 MG/DL (ref 8.3–10.1)
CHLORIDE SERPL-SCNC: 102 MMOL/L (ref 100–108)
CO2 SERPL-SCNC: 27 MMOL/L (ref 21–32)
CREAT SERPL-MCNC: 1.55 MG/DL (ref 0.6–1.3)
EOSINOPHIL # BLD AUTO: 0.27 THOUSAND/ΜL (ref 0–0.61)
EOSINOPHIL NFR BLD AUTO: 3 % (ref 0–6)
ERYTHROCYTE [DISTWIDTH] IN BLOOD BY AUTOMATED COUNT: 17.1 % (ref 11.6–15.1)
GFR SERPL CREATININE-BSD FRML MDRD: 48 ML/MIN/1.73SQ M
GLUCOSE SERPL-MCNC: 90 MG/DL (ref 65–140)
HCT VFR BLD AUTO: 25.9 % (ref 36.5–49.3)
HGB BLD-MCNC: 8.4 G/DL (ref 12–17)
IMM GRANULOCYTES # BLD AUTO: 0.08 THOUSAND/UL (ref 0–0.2)
IMM GRANULOCYTES NFR BLD AUTO: 1 % (ref 0–2)
LYMPHOCYTES # BLD AUTO: 0.85 THOUSANDS/ΜL (ref 0.6–4.47)
LYMPHOCYTES NFR BLD AUTO: 11 % (ref 14–44)
MAGNESIUM SERPL-MCNC: 1.5 MG/DL (ref 1.6–2.6)
MCH RBC QN AUTO: 27.9 PG (ref 26.8–34.3)
MCHC RBC AUTO-ENTMCNC: 32.4 G/DL (ref 31.4–37.4)
MCV RBC AUTO: 86 FL (ref 82–98)
MONOCYTES # BLD AUTO: 0.64 THOUSAND/ΜL (ref 0.17–1.22)
MONOCYTES NFR BLD AUTO: 8 % (ref 4–12)
NEUTROPHILS # BLD AUTO: 5.99 THOUSANDS/ΜL (ref 1.85–7.62)
NEUTS SEG NFR BLD AUTO: 77 % (ref 43–75)
NRBC BLD AUTO-RTO: 0 /100 WBCS
PHOSPHATE SERPL-MCNC: 3.5 MG/DL (ref 2.3–4.1)
PLATELET # BLD AUTO: 256 THOUSANDS/UL (ref 149–390)
PMV BLD AUTO: 10.2 FL (ref 8.9–12.7)
POTASSIUM SERPL-SCNC: 4.1 MMOL/L (ref 3.5–5.3)
PROT SERPL-MCNC: 5.1 G/DL (ref 6.4–8.2)
RBC # BLD AUTO: 3.01 MILLION/UL (ref 3.88–5.62)
SODIUM SERPL-SCNC: 135 MMOL/L (ref 136–145)
WBC # BLD AUTO: 7.86 THOUSAND/UL (ref 4.31–10.16)

## 2020-11-08 PROCEDURE — 83735 ASSAY OF MAGNESIUM: CPT | Performed by: PHYSICIAN ASSISTANT

## 2020-11-08 PROCEDURE — 80053 COMPREHEN METABOLIC PANEL: CPT | Performed by: PHYSICIAN ASSISTANT

## 2020-11-08 PROCEDURE — 85025 COMPLETE CBC W/AUTO DIFF WBC: CPT | Performed by: PHYSICIAN ASSISTANT

## 2020-11-08 PROCEDURE — 99233 SBSQ HOSP IP/OBS HIGH 50: CPT | Performed by: INTERNAL MEDICINE

## 2020-11-08 PROCEDURE — 94003 VENT MGMT INPAT SUBQ DAY: CPT

## 2020-11-08 PROCEDURE — NC001 PR NO CHARGE: Performed by: PHYSICIAN ASSISTANT

## 2020-11-08 PROCEDURE — 94760 N-INVAS EAR/PLS OXIMETRY 1: CPT

## 2020-11-08 PROCEDURE — 84100 ASSAY OF PHOSPHORUS: CPT | Performed by: PHYSICIAN ASSISTANT

## 2020-11-08 PROCEDURE — C9113 INJ PANTOPRAZOLE SODIUM, VIA: HCPCS | Performed by: NURSE PRACTITIONER

## 2020-11-08 PROCEDURE — 71045 X-RAY EXAM CHEST 1 VIEW: CPT

## 2020-11-08 PROCEDURE — 99239 HOSP IP/OBS DSCHRG MGMT >30: CPT | Performed by: PHYSICIAN ASSISTANT

## 2020-11-08 RX ORDER — ALBUTEROL SULFATE 2.5 MG/3ML
2.5 SOLUTION RESPIRATORY (INHALATION) EVERY 6 HOURS PRN
Qty: 90 VIAL | Refills: 0 | Status: SHIPPED | OUTPATIENT
Start: 2020-11-08 | End: 2020-11-08 | Stop reason: SDUPTHER

## 2020-11-08 RX ORDER — CLONAZEPAM 0.5 MG/1
0.5 TABLET ORAL
Qty: 10 TABLET | Refills: 0 | Status: SHIPPED | OUTPATIENT
Start: 2020-11-08 | End: 2020-11-09

## 2020-11-08 RX ORDER — CLONAZEPAM 0.5 MG/1
0.25 TABLET ORAL 2 TIMES DAILY PRN
Qty: 14 TABLET | Refills: 0 | Status: SHIPPED | OUTPATIENT
Start: 2020-11-08 | End: 2020-11-09

## 2020-11-08 RX ORDER — MIDODRINE HYDROCHLORIDE 10 MG/1
10 TABLET ORAL
Qty: 30 TABLET | Refills: 0 | Status: SHIPPED | OUTPATIENT
Start: 2020-11-08 | End: 2020-11-17 | Stop reason: SDUPTHER

## 2020-11-08 RX ORDER — ALBUTEROL SULFATE 2.5 MG/3ML
2.5 SOLUTION RESPIRATORY (INHALATION) EVERY 6 HOURS PRN
Qty: 90 VIAL | Refills: 0 | Status: SHIPPED | OUTPATIENT
Start: 2020-11-08 | End: 2020-12-08

## 2020-11-08 RX ADMIN — CLONAZEPAM 0.25 MG: 0.5 TABLET ORAL at 04:45

## 2020-11-08 RX ADMIN — Medication 125 MG: at 10:10

## 2020-11-08 RX ADMIN — AMPICILLIN SODIUM 2000 MG: 2 INJECTION, POWDER, FOR SOLUTION INTRAMUSCULAR; INTRAVENOUS at 06:05

## 2020-11-08 RX ADMIN — FENTANYL TRANSDERMAL 1 PATCH: 100 PATCH, EXTENDED RELEASE TRANSDERMAL at 10:08

## 2020-11-08 RX ADMIN — SUCRALFATE 1 G: 1 TABLET ORAL at 10:07

## 2020-11-08 RX ADMIN — FENTANYL 50 MCG: 50 PATCH TRANSDERMAL at 10:08

## 2020-11-08 RX ADMIN — OXYCODONE HYDROCHLORIDE 20 MG: 10 TABLET ORAL at 06:03

## 2020-11-08 RX ADMIN — MIDODRINE HYDROCHLORIDE 10 MG: 5 TABLET ORAL at 10:07

## 2020-11-08 RX ADMIN — PANTOPRAZOLE SODIUM 40 MG: 40 INJECTION, POWDER, FOR SOLUTION INTRAVENOUS at 10:05

## 2020-11-08 RX ADMIN — BUPROPION HYDROCHLORIDE 150 MG: 75 TABLET ORAL at 10:07

## 2020-11-09 ENCOUNTER — TELEPHONE (OUTPATIENT)
Dept: PALLIATIVE MEDICINE | Facility: CLINIC | Age: 60
End: 2020-11-09

## 2020-11-09 ENCOUNTER — TELEPHONE (OUTPATIENT)
Dept: INTERNAL MEDICINE CLINIC | Facility: CLINIC | Age: 60
End: 2020-11-09

## 2020-11-09 DIAGNOSIS — B95.2 ENTEROCOCCUS FAECALIS INFECTION: Primary | ICD-10-CM

## 2020-11-09 DIAGNOSIS — F41.9 ANXIETY: ICD-10-CM

## 2020-11-09 PROBLEM — F32.A ANXIETY AND DEPRESSION: Status: ACTIVE | Noted: 2020-11-09

## 2020-11-09 PROBLEM — E87.70 FLUID OVERLOAD: Status: ACTIVE | Noted: 2020-11-09

## 2020-11-09 PROBLEM — I95.9 HYPOTENSION: Status: ACTIVE | Noted: 2020-11-09

## 2020-11-09 PROBLEM — G89.3 CANCER RELATED PAIN: Status: ACTIVE | Noted: 2020-11-09

## 2020-11-09 PROBLEM — E46 PROTEIN CALORIE MALNUTRITION (HCC): Status: ACTIVE | Noted: 2020-11-02

## 2020-11-09 RX ORDER — CLONAZEPAM 0.5 MG/1
TABLET ORAL
Qty: 30 TABLET | Refills: 0 | Status: SHIPPED | OUTPATIENT
Start: 2020-11-09 | End: 2020-11-20 | Stop reason: SDUPTHER

## 2020-11-10 ENCOUNTER — OFFICE VISIT (OUTPATIENT)
Dept: INTERNAL MEDICINE CLINIC | Facility: CLINIC | Age: 60
End: 2020-11-10
Payer: MEDICARE

## 2020-11-10 ENCOUNTER — TRANSITIONAL CARE MANAGEMENT (OUTPATIENT)
Dept: INTERNAL MEDICINE CLINIC | Facility: CLINIC | Age: 60
End: 2020-11-10

## 2020-11-10 VITALS
DIASTOLIC BLOOD PRESSURE: 58 MMHG | HEIGHT: 72 IN | WEIGHT: 170 LBS | SYSTOLIC BLOOD PRESSURE: 90 MMHG | OXYGEN SATURATION: 99 % | HEART RATE: 90 BPM | BODY MASS INDEX: 23.03 KG/M2 | TEMPERATURE: 98.1 F

## 2020-11-10 DIAGNOSIS — N18.2 STAGE 2 CHRONIC KIDNEY DISEASE: ICD-10-CM

## 2020-11-10 DIAGNOSIS — K14.0 GLOSSITIS: ICD-10-CM

## 2020-11-10 DIAGNOSIS — I95.0 IDIOPATHIC HYPOTENSION: ICD-10-CM

## 2020-11-10 DIAGNOSIS — G89.3 CANCER RELATED PAIN: ICD-10-CM

## 2020-11-10 DIAGNOSIS — F32.9 REACTIVE DEPRESSION: ICD-10-CM

## 2020-11-10 DIAGNOSIS — E87.79 OTHER HYPERVOLEMIA: ICD-10-CM

## 2020-11-10 DIAGNOSIS — N17.0 ACUTE KIDNEY INJURY (AKI) WITH ACUTE TUBULAR NECROSIS (ATN) (HCC): Primary | ICD-10-CM

## 2020-11-10 DIAGNOSIS — F32.A ANXIETY AND DEPRESSION: ICD-10-CM

## 2020-11-10 DIAGNOSIS — N25.0 RENAL OSTEODYSTROPHY: ICD-10-CM

## 2020-11-10 DIAGNOSIS — F41.9 ANXIETY AND DEPRESSION: ICD-10-CM

## 2020-11-10 DIAGNOSIS — D62 ACUTE BLOOD LOSS ANEMIA: ICD-10-CM

## 2020-11-10 DIAGNOSIS — K83.09 ACUTE CHOLANGITIS: ICD-10-CM

## 2020-11-10 PROBLEM — N18.9 CKD (CHRONIC KIDNEY DISEASE): Status: ACTIVE | Noted: 2020-11-10

## 2020-11-10 PROBLEM — R33.9 URINARY RETENTION: Status: ACTIVE | Noted: 2020-11-10

## 2020-11-10 PROCEDURE — 99496 TRANSJ CARE MGMT HIGH F2F 7D: CPT | Performed by: INTERNAL MEDICINE

## 2020-11-10 RX ORDER — OXYCODONE HCL 20 MG/1
20 TABLET, FILM COATED, EXTENDED RELEASE ORAL EVERY 6 HOURS PRN
COMMUNITY
End: 2020-11-20 | Stop reason: CLARIF

## 2020-11-10 RX ORDER — BUPROPION HYDROCHLORIDE 150 MG/1
150 TABLET, EXTENDED RELEASE ORAL 3 TIMES DAILY
Qty: 90 TABLET | Refills: 3 | Status: SHIPPED | OUTPATIENT
Start: 2020-11-10 | End: 2021-02-05 | Stop reason: ALTCHOICE

## 2020-11-10 RX ORDER — BUPROPION HYDROCHLORIDE 150 MG/1
150 TABLET, EXTENDED RELEASE ORAL 3 TIMES DAILY
COMMUNITY
End: 2020-11-10 | Stop reason: SDUPTHER

## 2020-11-10 RX ORDER — FUROSEMIDE 40 MG/1
40 TABLET ORAL DAILY
Qty: 30 TABLET | Refills: 2 | Status: SHIPPED | OUTPATIENT
Start: 2020-11-10

## 2020-11-10 RX ORDER — POTASSIUM CHLORIDE 750 MG/1
10 TABLET, EXTENDED RELEASE ORAL DAILY
Qty: 30 TABLET | Refills: 5 | Status: SHIPPED | OUTPATIENT
Start: 2020-11-10 | End: 2021-02-05 | Stop reason: ALTCHOICE

## 2020-11-11 ENCOUNTER — TELEPHONE (OUTPATIENT)
Dept: INTERNAL MEDICINE CLINIC | Facility: CLINIC | Age: 60
End: 2020-11-11

## 2020-11-12 ENCOUNTER — LAB (OUTPATIENT)
Dept: LAB | Facility: CLINIC | Age: 60
End: 2020-11-12
Payer: MEDICARE

## 2020-11-12 ENCOUNTER — TRANSCRIBE ORDERS (OUTPATIENT)
Dept: LAB | Facility: CLINIC | Age: 60
End: 2020-11-12

## 2020-11-12 DIAGNOSIS — G89.3 CANCER RELATED PAIN: ICD-10-CM

## 2020-11-12 DIAGNOSIS — E87.79 OTHER HYPERVOLEMIA: ICD-10-CM

## 2020-11-12 DIAGNOSIS — D62 ACUTE BLOOD LOSS ANEMIA: ICD-10-CM

## 2020-11-12 DIAGNOSIS — B95.2 ENTEROCOCCUS FAECALIS INFECTION: ICD-10-CM

## 2020-11-12 DIAGNOSIS — R78.81 GRAM-POSITIVE BACTEREMIA: ICD-10-CM

## 2020-11-12 DIAGNOSIS — N17.0 ACUTE KIDNEY INJURY (AKI) WITH ACUTE TUBULAR NECROSIS (ATN) (HCC): ICD-10-CM

## 2020-11-12 DIAGNOSIS — I95.0 IDIOPATHIC HYPOTENSION: ICD-10-CM

## 2020-11-12 DIAGNOSIS — K14.0 GLOSSITIS: ICD-10-CM

## 2020-11-12 LAB
ALBUMIN SERPL BCP-MCNC: 2.3 G/DL (ref 3.5–5)
ALP SERPL-CCNC: 105 U/L (ref 46–116)
ALT SERPL W P-5'-P-CCNC: 10 U/L (ref 12–78)
ANION GAP SERPL CALCULATED.3IONS-SCNC: 4 MMOL/L (ref 4–13)
AST SERPL W P-5'-P-CCNC: 15 U/L (ref 5–45)
BASOPHILS # BLD AUTO: 0.03 THOUSANDS/ΜL (ref 0–0.1)
BASOPHILS NFR BLD AUTO: 0 % (ref 0–1)
BILIRUB SERPL-MCNC: 0.46 MG/DL (ref 0.2–1)
BUN SERPL-MCNC: 9 MG/DL (ref 5–25)
CALCIUM ALBUM COR SERPL-MCNC: 9.2 MG/DL (ref 8.3–10.1)
CALCIUM SERPL-MCNC: 7.8 MG/DL (ref 8.3–10.1)
CHLORIDE SERPL-SCNC: 107 MMOL/L (ref 100–108)
CO2 SERPL-SCNC: 29 MMOL/L (ref 21–32)
CREAT SERPL-MCNC: 1.05 MG/DL (ref 0.6–1.3)
EOSINOPHIL # BLD AUTO: 0.15 THOUSAND/ΜL (ref 0–0.61)
EOSINOPHIL NFR BLD AUTO: 2 % (ref 0–6)
ERYTHROCYTE [DISTWIDTH] IN BLOOD BY AUTOMATED COUNT: 16.6 % (ref 11.6–15.1)
FERRITIN SERPL-MCNC: 433 NG/ML (ref 8–388)
FOLATE SERPL-MCNC: 11 NG/ML (ref 3.1–17.5)
GFR SERPL CREATININE-BSD FRML MDRD: 77 ML/MIN/1.73SQ M
GLUCOSE SERPL-MCNC: 115 MG/DL (ref 65–140)
HCT VFR BLD AUTO: 29.1 % (ref 36.5–49.3)
HGB BLD-MCNC: 8.9 G/DL (ref 12–17)
IMM GRANULOCYTES # BLD AUTO: 0.05 THOUSAND/UL (ref 0–0.2)
IMM GRANULOCYTES NFR BLD AUTO: 1 % (ref 0–2)
IRON SATN MFR SERPL: 32 %
IRON SERPL-MCNC: 25 UG/DL (ref 65–175)
LYMPHOCYTES # BLD AUTO: 0.59 THOUSANDS/ΜL (ref 0.6–4.47)
LYMPHOCYTES NFR BLD AUTO: 6 % (ref 14–44)
MAGNESIUM SERPL-MCNC: 1.6 MG/DL (ref 1.6–2.6)
MCH RBC QN AUTO: 27.1 PG (ref 26.8–34.3)
MCHC RBC AUTO-ENTMCNC: 30.6 G/DL (ref 31.4–37.4)
MCV RBC AUTO: 88 FL (ref 82–98)
MONOCYTES # BLD AUTO: 0.6 THOUSAND/ΜL (ref 0.17–1.22)
MONOCYTES NFR BLD AUTO: 6 % (ref 4–12)
NEUTROPHILS # BLD AUTO: 8.37 THOUSANDS/ΜL (ref 1.85–7.62)
NEUTS SEG NFR BLD AUTO: 85 % (ref 43–75)
NRBC BLD AUTO-RTO: 0 /100 WBCS
PLATELET # BLD AUTO: 349 THOUSANDS/UL (ref 149–390)
PMV BLD AUTO: 11.1 FL (ref 8.9–12.7)
POTASSIUM SERPL-SCNC: 3.5 MMOL/L (ref 3.5–5.3)
PROT SERPL-MCNC: 5.5 G/DL (ref 6.4–8.2)
RBC # BLD AUTO: 3.29 MILLION/UL (ref 3.88–5.62)
SODIUM SERPL-SCNC: 140 MMOL/L (ref 136–145)
TIBC SERPL-MCNC: 77 UG/DL (ref 250–450)
VIT B12 SERPL-MCNC: 874 PG/ML (ref 100–900)
WBC # BLD AUTO: 9.79 THOUSAND/UL (ref 4.31–10.16)

## 2020-11-12 PROCEDURE — 82746 ASSAY OF FOLIC ACID SERUM: CPT

## 2020-11-12 PROCEDURE — 83540 ASSAY OF IRON: CPT

## 2020-11-12 PROCEDURE — 36415 COLL VENOUS BLD VENIPUNCTURE: CPT

## 2020-11-12 PROCEDURE — 83735 ASSAY OF MAGNESIUM: CPT

## 2020-11-12 PROCEDURE — 85025 COMPLETE CBC W/AUTO DIFF WBC: CPT

## 2020-11-12 PROCEDURE — 80053 COMPREHEN METABOLIC PANEL: CPT

## 2020-11-12 PROCEDURE — 82728 ASSAY OF FERRITIN: CPT

## 2020-11-12 PROCEDURE — 83550 IRON BINDING TEST: CPT

## 2020-11-12 PROCEDURE — 82607 VITAMIN B-12: CPT

## 2020-11-13 ENCOUNTER — TELEPHONE (OUTPATIENT)
Dept: OTHER | Facility: HOSPITAL | Age: 60
End: 2020-11-13

## 2020-11-16 ENCOUNTER — TELEPHONE (OUTPATIENT)
Dept: OTHER | Facility: HOSPITAL | Age: 60
End: 2020-11-16

## 2020-11-16 ENCOUNTER — LAB (OUTPATIENT)
Dept: LAB | Facility: CLINIC | Age: 60
End: 2020-11-16
Payer: MEDICARE

## 2020-11-16 LAB — CORTIS AM PEAK SERPL-MCNC: 23.4 UG/DL (ref 4.2–22.4)

## 2020-11-16 PROCEDURE — 82533 TOTAL CORTISOL: CPT

## 2020-11-17 ENCOUNTER — TELEMEDICINE (OUTPATIENT)
Dept: INTERNAL MEDICINE CLINIC | Facility: CLINIC | Age: 60
End: 2020-11-17
Payer: MEDICARE

## 2020-11-17 ENCOUNTER — TELEPHONE (OUTPATIENT)
Dept: OTHER | Facility: HOSPITAL | Age: 60
End: 2020-11-17

## 2020-11-17 DIAGNOSIS — K14.0 GLOSSITIS: ICD-10-CM

## 2020-11-17 DIAGNOSIS — D62 ACUTE BLOOD LOSS ANEMIA: ICD-10-CM

## 2020-11-17 DIAGNOSIS — R33.9 URINARY RETENTION: ICD-10-CM

## 2020-11-17 DIAGNOSIS — E43 SEVERE PROTEIN-CALORIE MALNUTRITION (HCC): ICD-10-CM

## 2020-11-17 DIAGNOSIS — B95.2 ENTEROCOCCUS FAECALIS INFECTION: ICD-10-CM

## 2020-11-17 DIAGNOSIS — R78.81 GRAM-POSITIVE BACTEREMIA: ICD-10-CM

## 2020-11-17 DIAGNOSIS — K83.09 ACUTE CHOLANGITIS: Primary | ICD-10-CM

## 2020-11-17 DIAGNOSIS — E87.79 OTHER HYPERVOLEMIA: ICD-10-CM

## 2020-11-17 DIAGNOSIS — N17.0 ACUTE KIDNEY INJURY (AKI) WITH ACUTE TUBULAR NECROSIS (ATN) (HCC): ICD-10-CM

## 2020-11-17 DIAGNOSIS — I95.0 IDIOPATHIC HYPOTENSION: ICD-10-CM

## 2020-11-17 PROCEDURE — 99214 OFFICE O/P EST MOD 30 MIN: CPT | Performed by: INTERNAL MEDICINE

## 2020-11-18 ENCOUNTER — PROCEDURE VISIT (OUTPATIENT)
Dept: UROLOGY | Facility: CLINIC | Age: 60
End: 2020-11-18
Payer: MEDICARE

## 2020-11-18 VITALS — RESPIRATION RATE: 18 BRPM | HEIGHT: 72 IN | BODY MASS INDEX: 21.54 KG/M2 | TEMPERATURE: 98.3 F | WEIGHT: 159 LBS

## 2020-11-18 DIAGNOSIS — R33.9 URINARY RETENTION: Primary | ICD-10-CM

## 2020-11-18 LAB — POST-VOID RESIDUAL VOLUME, ML POC: 605 ML

## 2020-11-18 PROCEDURE — 51702 INSERT TEMP BLADDER CATH: CPT

## 2020-11-18 PROCEDURE — 51798 US URINE CAPACITY MEASURE: CPT

## 2020-11-19 RX ORDER — MIDODRINE HYDROCHLORIDE 10 MG/1
10 TABLET ORAL
Qty: 90 TABLET | Refills: 0 | Status: SHIPPED | OUTPATIENT
Start: 2020-11-19 | End: 2020-12-14

## 2020-11-20 ENCOUNTER — OFFICE VISIT (OUTPATIENT)
Dept: PALLIATIVE MEDICINE | Facility: CLINIC | Age: 60
End: 2020-11-20
Payer: MEDICARE

## 2020-11-20 VITALS
OXYGEN SATURATION: 95 % | BODY MASS INDEX: 20.51 KG/M2 | RESPIRATION RATE: 18 BRPM | HEIGHT: 72 IN | SYSTOLIC BLOOD PRESSURE: 98 MMHG | HEART RATE: 68 BPM | TEMPERATURE: 96.5 F | DIASTOLIC BLOOD PRESSURE: 60 MMHG | WEIGHT: 151.46 LBS

## 2020-11-20 DIAGNOSIS — R11.0 NAUSEA: ICD-10-CM

## 2020-11-20 DIAGNOSIS — C22.1 CHOLANGIOCARCINOMA (HCC): ICD-10-CM

## 2020-11-20 DIAGNOSIS — G89.3 NEOPLASM RELATED PAIN: Primary | ICD-10-CM

## 2020-11-20 DIAGNOSIS — F41.9 ANXIETY DISORDER, UNSPECIFIED TYPE: ICD-10-CM

## 2020-11-20 DIAGNOSIS — F41.9 ANXIETY: ICD-10-CM

## 2020-11-20 DIAGNOSIS — K31.1 GASTRIC OUTLET OBSTRUCTION: ICD-10-CM

## 2020-11-20 PROCEDURE — 99205 OFFICE O/P NEW HI 60 MIN: CPT | Performed by: FAMILY MEDICINE

## 2020-11-20 RX ORDER — NALOXONE HYDROCHLORIDE 4 MG/.1ML
SPRAY NASAL
Qty: 1 EACH | Refills: 1 | Status: SHIPPED | OUTPATIENT
Start: 2020-11-20

## 2020-11-20 RX ORDER — HALOPERIDOL 0.5 MG/1
0.5 TABLET ORAL EVERY 6 HOURS PRN
Qty: 28 TABLET | Refills: 0 | Status: SHIPPED | OUTPATIENT
Start: 2020-11-20 | End: 2020-12-04 | Stop reason: SDUPTHER

## 2020-11-20 RX ORDER — CLONAZEPAM 0.5 MG/1
TABLET ORAL
Qty: 60 TABLET | Refills: 0 | Status: SHIPPED | OUTPATIENT
Start: 2020-11-20 | End: 2020-12-04 | Stop reason: SDUPTHER

## 2020-11-20 RX ORDER — LIDOCAINE 50 MG/G
1 PATCH TOPICAL DAILY
Qty: 6 PATCH | Refills: 0 | Status: SHIPPED | OUTPATIENT
Start: 2020-11-20 | End: 2020-12-04 | Stop reason: ALTCHOICE

## 2020-11-20 RX ORDER — OXYCODONE HYDROCHLORIDE 20 MG/1
20 TABLET ORAL EVERY 4 HOURS PRN
Qty: 180 TABLET | Refills: 0 | Status: SHIPPED | OUTPATIENT
Start: 2020-11-20 | End: 2020-12-04 | Stop reason: SDUPTHER

## 2020-11-23 ENCOUNTER — TELEPHONE (OUTPATIENT)
Dept: PALLIATIVE MEDICINE | Facility: CLINIC | Age: 60
End: 2020-11-23

## 2020-11-23 ENCOUNTER — TELEMEDICINE (OUTPATIENT)
Dept: INFECTIOUS DISEASES | Facility: CLINIC | Age: 60
End: 2020-11-23
Payer: MEDICARE

## 2020-11-23 ENCOUNTER — LAB (OUTPATIENT)
Dept: LAB | Facility: CLINIC | Age: 60
End: 2020-11-23
Payer: MEDICARE

## 2020-11-23 DIAGNOSIS — B95.2 ENTEROCOCCUS FAECALIS INFECTION: Primary | ICD-10-CM

## 2020-11-23 DIAGNOSIS — C22.1 CHOLANGIOCARCINOMA (HCC): Primary | ICD-10-CM

## 2020-11-23 DIAGNOSIS — D62 ACUTE BLOOD LOSS ANEMIA: ICD-10-CM

## 2020-11-23 DIAGNOSIS — K14.0 GLOSSITIS: ICD-10-CM

## 2020-11-23 DIAGNOSIS — C22.1 CHOLANGIOCARCINOMA (HCC): ICD-10-CM

## 2020-11-23 DIAGNOSIS — K83.09 ACUTE CHOLANGITIS: ICD-10-CM

## 2020-11-23 DIAGNOSIS — B95.2 ENTEROCOCCUS FAECALIS INFECTION: ICD-10-CM

## 2020-11-23 DIAGNOSIS — G89.3 NEOPLASM RELATED PAIN: ICD-10-CM

## 2020-11-23 DIAGNOSIS — Z95.828 PORT-A-CATH IN PLACE: ICD-10-CM

## 2020-11-23 DIAGNOSIS — N17.0 ACUTE KIDNEY INJURY (AKI) WITH ACUTE TUBULAR NECROSIS (ATN) (HCC): ICD-10-CM

## 2020-11-23 DIAGNOSIS — K31.1 GASTRIC OUTLET OBSTRUCTION: ICD-10-CM

## 2020-11-23 LAB — MAGNESIUM SERPL-MCNC: 2.1 MG/DL (ref 1.6–2.6)

## 2020-11-23 PROCEDURE — 83735 ASSAY OF MAGNESIUM: CPT

## 2020-11-23 PROCEDURE — 99213 OFFICE O/P EST LOW 20 MIN: CPT | Performed by: INTERNAL MEDICINE

## 2020-11-23 RX ORDER — FENTANYL 75 UG/H
2 PATCH TRANSDERMAL
Qty: 10 PATCH | Refills: 0 | Status: SHIPPED | OUTPATIENT
Start: 2020-11-23 | End: 2020-11-24 | Stop reason: SDUPTHER

## 2020-11-24 ENCOUNTER — TELEMEDICINE (OUTPATIENT)
Dept: INTERNAL MEDICINE CLINIC | Facility: CLINIC | Age: 60
End: 2020-11-24
Payer: MEDICARE

## 2020-11-24 DIAGNOSIS — C22.1 CHOLANGIOCARCINOMA (HCC): Primary | ICD-10-CM

## 2020-11-24 DIAGNOSIS — K31.1 GASTRIC OUTLET OBSTRUCTION: ICD-10-CM

## 2020-11-24 DIAGNOSIS — C22.1 CHOLANGIOCARCINOMA (HCC): ICD-10-CM

## 2020-11-24 DIAGNOSIS — E87.79 OTHER HYPERVOLEMIA: ICD-10-CM

## 2020-11-24 DIAGNOSIS — R74.8 BLOOD ALKALINE PHOSPHATASE INCREASED COMPARED WITH PRIOR MEASUREMENT: ICD-10-CM

## 2020-11-24 DIAGNOSIS — E43 SEVERE PROTEIN-CALORIE MALNUTRITION (HCC): ICD-10-CM

## 2020-11-24 DIAGNOSIS — R33.9 URINARY RETENTION: ICD-10-CM

## 2020-11-24 DIAGNOSIS — N17.0 ACUTE KIDNEY INJURY (AKI) WITH ACUTE TUBULAR NECROSIS (ATN) (HCC): Primary | ICD-10-CM

## 2020-11-24 DIAGNOSIS — I95.0 IDIOPATHIC HYPOTENSION: ICD-10-CM

## 2020-11-24 DIAGNOSIS — G89.3 NEOPLASM RELATED PAIN: ICD-10-CM

## 2020-11-24 DIAGNOSIS — C16.9 MALIGNANT NEOPLASM OF STOMACH, UNSPECIFIED LOCATION (HCC): ICD-10-CM

## 2020-11-24 DIAGNOSIS — G89.3 CANCER RELATED PAIN: ICD-10-CM

## 2020-11-24 DIAGNOSIS — K14.0 GLOSSITIS: ICD-10-CM

## 2020-11-24 PROCEDURE — 99214 OFFICE O/P EST MOD 30 MIN: CPT | Performed by: INTERNAL MEDICINE

## 2020-11-24 RX ORDER — FENTANYL 50 UG/H
1 PATCH TRANSDERMAL
Qty: 5 PATCH | Refills: 0 | Status: SHIPPED | OUTPATIENT
Start: 2020-11-24 | End: 2020-12-04 | Stop reason: SDUPTHER

## 2020-12-01 ENCOUNTER — LAB (OUTPATIENT)
Dept: LAB | Facility: CLINIC | Age: 60
End: 2020-12-01
Payer: MEDICARE

## 2020-12-01 ENCOUNTER — TELEMEDICINE (OUTPATIENT)
Dept: INTERNAL MEDICINE CLINIC | Facility: CLINIC | Age: 60
End: 2020-12-01
Payer: MEDICARE

## 2020-12-01 DIAGNOSIS — I95.0 IDIOPATHIC HYPOTENSION: ICD-10-CM

## 2020-12-01 DIAGNOSIS — E87.79 OTHER HYPERVOLEMIA: ICD-10-CM

## 2020-12-01 DIAGNOSIS — N17.0 ACUTE KIDNEY INJURY (AKI) WITH ACUTE TUBULAR NECROSIS (ATN) (HCC): ICD-10-CM

## 2020-12-01 DIAGNOSIS — B95.2 ENTEROCOCCUS FAECALIS INFECTION: ICD-10-CM

## 2020-12-01 DIAGNOSIS — R19.7 DIARRHEA, UNSPECIFIED TYPE: ICD-10-CM

## 2020-12-01 DIAGNOSIS — D62 ACUTE BLOOD LOSS ANEMIA: Primary | ICD-10-CM

## 2020-12-01 DIAGNOSIS — K83.09 ACUTE CHOLANGITIS: ICD-10-CM

## 2020-12-01 DIAGNOSIS — R78.81 GRAM-POSITIVE BACTEREMIA: ICD-10-CM

## 2020-12-01 DIAGNOSIS — D62 ACUTE BLOOD LOSS ANEMIA: ICD-10-CM

## 2020-12-01 DIAGNOSIS — R74.8 BLOOD ALKALINE PHOSPHATASE INCREASED COMPARED WITH PRIOR MEASUREMENT: ICD-10-CM

## 2020-12-01 LAB
ALBUMIN SERPL BCP-MCNC: 2.5 G/DL (ref 3.5–5)
ALP SERPL-CCNC: 355 U/L (ref 46–116)
ALT SERPL W P-5'-P-CCNC: 21 U/L (ref 12–78)
ANION GAP SERPL CALCULATED.3IONS-SCNC: 5 MMOL/L (ref 4–13)
AST SERPL W P-5'-P-CCNC: 46 U/L (ref 5–45)
BASOPHILS # BLD AUTO: 0.03 THOUSANDS/ΜL (ref 0–0.1)
BASOPHILS NFR BLD AUTO: 1 % (ref 0–1)
BILIRUB SERPL-MCNC: 0.37 MG/DL (ref 0.2–1)
BUN SERPL-MCNC: 6 MG/DL (ref 5–25)
CALCIUM ALBUM COR SERPL-MCNC: 9.8 MG/DL (ref 8.3–10.1)
CALCIUM SERPL-MCNC: 8.6 MG/DL (ref 8.3–10.1)
CHLORIDE SERPL-SCNC: 105 MMOL/L (ref 100–108)
CO2 SERPL-SCNC: 28 MMOL/L (ref 21–32)
CREAT SERPL-MCNC: 0.81 MG/DL (ref 0.6–1.3)
EOSINOPHIL # BLD AUTO: 0.14 THOUSAND/ΜL (ref 0–0.61)
EOSINOPHIL NFR BLD AUTO: 2 % (ref 0–6)
ERYTHROCYTE [DISTWIDTH] IN BLOOD BY AUTOMATED COUNT: 17.9 % (ref 11.6–15.1)
FERRITIN SERPL-MCNC: 506 NG/ML (ref 8–388)
GFR SERPL CREATININE-BSD FRML MDRD: 97 ML/MIN/1.73SQ M
GLUCOSE SERPL-MCNC: 99 MG/DL (ref 65–140)
HCT VFR BLD AUTO: 29.2 % (ref 36.5–49.3)
HGB BLD-MCNC: 9.2 G/DL (ref 12–17)
IMM GRANULOCYTES # BLD AUTO: 0.02 THOUSAND/UL (ref 0–0.2)
IMM GRANULOCYTES NFR BLD AUTO: 0 % (ref 0–2)
IRON SATN MFR SERPL: 37 %
IRON SERPL-MCNC: 36 UG/DL (ref 65–175)
LYMPHOCYTES # BLD AUTO: 0.81 THOUSANDS/ΜL (ref 0.6–4.47)
LYMPHOCYTES NFR BLD AUTO: 12 % (ref 14–44)
MCH RBC QN AUTO: 27.6 PG (ref 26.8–34.3)
MCHC RBC AUTO-ENTMCNC: 31.5 G/DL (ref 31.4–37.4)
MCV RBC AUTO: 88 FL (ref 82–98)
MONOCYTES # BLD AUTO: 0.59 THOUSAND/ΜL (ref 0.17–1.22)
MONOCYTES NFR BLD AUTO: 9 % (ref 4–12)
NEUTROPHILS # BLD AUTO: 5.06 THOUSANDS/ΜL (ref 1.85–7.62)
NEUTS SEG NFR BLD AUTO: 76 % (ref 43–75)
NRBC BLD AUTO-RTO: 0 /100 WBCS
PLATELET # BLD AUTO: 260 THOUSANDS/UL (ref 149–390)
PMV BLD AUTO: 11.7 FL (ref 8.9–12.7)
POTASSIUM SERPL-SCNC: 4.1 MMOL/L (ref 3.5–5.3)
PROT SERPL-MCNC: 5.8 G/DL (ref 6.4–8.2)
RBC # BLD AUTO: 3.33 MILLION/UL (ref 3.88–5.62)
SODIUM SERPL-SCNC: 138 MMOL/L (ref 136–145)
TIBC SERPL-MCNC: 98 UG/DL (ref 250–450)
WBC # BLD AUTO: 6.65 THOUSAND/UL (ref 4.31–10.16)

## 2020-12-01 PROCEDURE — 80053 COMPREHEN METABOLIC PANEL: CPT

## 2020-12-01 PROCEDURE — 85025 COMPLETE CBC W/AUTO DIFF WBC: CPT

## 2020-12-01 PROCEDURE — 99214 OFFICE O/P EST MOD 30 MIN: CPT | Performed by: INTERNAL MEDICINE

## 2020-12-01 PROCEDURE — 82728 ASSAY OF FERRITIN: CPT

## 2020-12-01 PROCEDURE — 83540 ASSAY OF IRON: CPT

## 2020-12-01 PROCEDURE — 36415 COLL VENOUS BLD VENIPUNCTURE: CPT

## 2020-12-01 PROCEDURE — 83550 IRON BINDING TEST: CPT

## 2020-12-03 ENCOUNTER — TELEPHONE (OUTPATIENT)
Dept: INTERNAL MEDICINE CLINIC | Facility: CLINIC | Age: 60
End: 2020-12-03

## 2020-12-03 DIAGNOSIS — C16.9 MALIGNANT NEOPLASM OF STOMACH, UNSPECIFIED LOCATION (HCC): ICD-10-CM

## 2020-12-03 DIAGNOSIS — C22.1 CHOLANGIOCARCINOMA (HCC): Primary | ICD-10-CM

## 2020-12-04 ENCOUNTER — OFFICE VISIT (OUTPATIENT)
Dept: PALLIATIVE MEDICINE | Facility: CLINIC | Age: 60
End: 2020-12-04
Payer: COMMERCIAL

## 2020-12-04 VITALS
SYSTOLIC BLOOD PRESSURE: 90 MMHG | HEART RATE: 83 BPM | BODY MASS INDEX: 19.68 KG/M2 | RESPIRATION RATE: 18 BRPM | HEIGHT: 72 IN | OXYGEN SATURATION: 97 % | TEMPERATURE: 97.4 F | DIASTOLIC BLOOD PRESSURE: 50 MMHG | WEIGHT: 145.28 LBS

## 2020-12-04 DIAGNOSIS — G89.3 NEOPLASM RELATED PAIN: ICD-10-CM

## 2020-12-04 DIAGNOSIS — R11.0 NAUSEA: ICD-10-CM

## 2020-12-04 DIAGNOSIS — F41.9 ANXIETY: ICD-10-CM

## 2020-12-04 DIAGNOSIS — C22.1 CHOLANGIOCARCINOMA (HCC): ICD-10-CM

## 2020-12-04 DIAGNOSIS — K31.1 GASTRIC OUTLET OBSTRUCTION: ICD-10-CM

## 2020-12-04 DIAGNOSIS — R63.0 ANOREXIA: Primary | ICD-10-CM

## 2020-12-04 PROCEDURE — 99215 OFFICE O/P EST HI 40 MIN: CPT | Performed by: FAMILY MEDICINE

## 2020-12-04 RX ORDER — HALOPERIDOL 0.5 MG/1
0.5 TABLET ORAL EVERY 6 HOURS PRN
Qty: 90 TABLET | Refills: 0 | Status: SHIPPED | OUTPATIENT
Start: 2020-12-04 | End: 2020-12-30

## 2020-12-04 RX ORDER — CLONAZEPAM 0.5 MG/1
TABLET ORAL
Qty: 60 TABLET | Refills: 0 | Status: SHIPPED | OUTPATIENT
Start: 2020-12-19 | End: 2021-02-05 | Stop reason: ALTCHOICE

## 2020-12-04 RX ORDER — OXYCODONE HYDROCHLORIDE 20 MG/1
20 TABLET ORAL EVERY 4 HOURS PRN
Qty: 180 TABLET | Refills: 0 | Status: SHIPPED | OUTPATIENT
Start: 2020-12-19 | End: 2021-01-18

## 2020-12-04 RX ORDER — DEXAMETHASONE 2 MG/1
2 TABLET ORAL 2 TIMES DAILY WITH MEALS
Qty: 14 TABLET | Refills: 0 | Status: SHIPPED | OUTPATIENT
Start: 2020-12-04 | End: 2020-12-11

## 2020-12-04 RX ORDER — FENTANYL 50 UG/H
1 PATCH TRANSDERMAL
Qty: 10 PATCH | Refills: 0 | Status: SHIPPED | OUTPATIENT
Start: 2020-12-09 | End: 2021-01-06 | Stop reason: SDUPTHER

## 2020-12-04 RX ORDER — FENTANYL 100 UG/H
1 PATCH TRANSDERMAL
Qty: 10 PATCH | Refills: 0 | Status: SHIPPED | OUTPATIENT
Start: 2020-12-09 | End: 2021-01-06 | Stop reason: SDUPTHER

## 2020-12-07 ENCOUNTER — LAB (OUTPATIENT)
Dept: LAB | Facility: CLINIC | Age: 60
End: 2020-12-07
Payer: MEDICARE

## 2020-12-07 DIAGNOSIS — C22.1 CHOLANGIOCARCINOMA (HCC): ICD-10-CM

## 2020-12-07 DIAGNOSIS — R74.8 BLOOD ALKALINE PHOSPHATASE INCREASED COMPARED WITH PRIOR MEASUREMENT: ICD-10-CM

## 2020-12-07 LAB
ALBUMIN SERPL BCP-MCNC: 2.7 G/DL (ref 3.5–5)
ALP SERPL-CCNC: 474 U/L (ref 46–116)
ALT SERPL W P-5'-P-CCNC: 27 U/L (ref 12–78)
ANION GAP SERPL CALCULATED.3IONS-SCNC: 3 MMOL/L (ref 4–13)
AST SERPL W P-5'-P-CCNC: 54 U/L (ref 5–45)
BILIRUB SERPL-MCNC: 0.52 MG/DL (ref 0.2–1)
BUN SERPL-MCNC: 11 MG/DL (ref 5–25)
CALCIUM ALBUM COR SERPL-MCNC: 10.3 MG/DL (ref 8.3–10.1)
CALCIUM SERPL-MCNC: 9.3 MG/DL (ref 8.3–10.1)
CHLORIDE SERPL-SCNC: 102 MMOL/L (ref 100–108)
CO2 SERPL-SCNC: 29 MMOL/L (ref 21–32)
CREAT SERPL-MCNC: 0.83 MG/DL (ref 0.6–1.3)
GFR SERPL CREATININE-BSD FRML MDRD: 96 ML/MIN/1.73SQ M
GLUCOSE SERPL-MCNC: 96 MG/DL (ref 65–140)
POTASSIUM SERPL-SCNC: 5.1 MMOL/L (ref 3.5–5.3)
PROT SERPL-MCNC: 6.2 G/DL (ref 6.4–8.2)
SODIUM SERPL-SCNC: 134 MMOL/L (ref 136–145)

## 2020-12-07 PROCEDURE — 80053 COMPREHEN METABOLIC PANEL: CPT

## 2020-12-07 PROCEDURE — 36415 COLL VENOUS BLD VENIPUNCTURE: CPT

## 2020-12-07 PROCEDURE — 84630 ASSAY OF ZINC: CPT

## 2020-12-08 DIAGNOSIS — C22.1 CHOLANGIOCARCINOMA (HCC): Primary | ICD-10-CM

## 2020-12-08 DIAGNOSIS — K31.1 GASTRIC OUTLET OBSTRUCTION: ICD-10-CM

## 2020-12-09 ENCOUNTER — HOSPITAL ENCOUNTER (OUTPATIENT)
Dept: RADIOLOGY | Facility: HOSPITAL | Age: 60
Discharge: HOME | End: 2020-12-09
Attending: INTERNAL MEDICINE
Payer: MEDICARE

## 2020-12-09 DIAGNOSIS — C22.1 CHOLANGIOCARCINOMA (HCC): ICD-10-CM

## 2020-12-09 PROCEDURE — G1004 CDSM NDSC: HCPCS

## 2020-12-09 PROCEDURE — 74181 MRI ABDOMEN W/O CONTRAST: CPT

## 2020-12-12 LAB — ZINC SERPL-MCNC: 64 UG/DL (ref 56–134)

## 2020-12-14 ENCOUNTER — LAB (OUTPATIENT)
Dept: LAB | Facility: CLINIC | Age: 60
End: 2020-12-14
Payer: MEDICARE

## 2020-12-14 DIAGNOSIS — R78.81 GRAM-POSITIVE BACTEREMIA: ICD-10-CM

## 2020-12-14 DIAGNOSIS — K83.09 ACUTE CHOLANGITIS: ICD-10-CM

## 2020-12-14 DIAGNOSIS — R74.8 BLOOD ALKALINE PHOSPHATASE INCREASED COMPARED WITH PRIOR MEASUREMENT: ICD-10-CM

## 2020-12-14 DIAGNOSIS — N17.0 ACUTE KIDNEY INJURY (AKI) WITH ACUTE TUBULAR NECROSIS (ATN) (HCC): ICD-10-CM

## 2020-12-14 DIAGNOSIS — B95.2 ENTEROCOCCUS FAECALIS INFECTION: ICD-10-CM

## 2020-12-14 DIAGNOSIS — D62 ACUTE BLOOD LOSS ANEMIA: ICD-10-CM

## 2020-12-14 LAB
ALBUMIN SERPL BCP-MCNC: 2.5 G/DL (ref 3.5–5)
ALP SERPL-CCNC: 371 U/L (ref 46–116)
ALT SERPL W P-5'-P-CCNC: 22 U/L (ref 12–78)
ANION GAP SERPL CALCULATED.3IONS-SCNC: 8 MMOL/L (ref 4–13)
AST SERPL W P-5'-P-CCNC: 43 U/L (ref 5–45)
BASOPHILS # BLD AUTO: 0.03 THOUSANDS/ΜL (ref 0–0.1)
BASOPHILS NFR BLD AUTO: 0 % (ref 0–1)
BILIRUB SERPL-MCNC: 0.52 MG/DL (ref 0.2–1)
BUN SERPL-MCNC: 15 MG/DL (ref 5–25)
CALCIUM ALBUM COR SERPL-MCNC: 10.2 MG/DL (ref 8.3–10.1)
CALCIUM SERPL-MCNC: 9 MG/DL (ref 8.3–10.1)
CHLORIDE SERPL-SCNC: 101 MMOL/L (ref 100–108)
CO2 SERPL-SCNC: 26 MMOL/L (ref 21–32)
CREAT SERPL-MCNC: 1.24 MG/DL (ref 0.6–1.3)
EOSINOPHIL # BLD AUTO: 0.1 THOUSAND/ΜL (ref 0–0.61)
EOSINOPHIL NFR BLD AUTO: 1 % (ref 0–6)
ERYTHROCYTE [DISTWIDTH] IN BLOOD BY AUTOMATED COUNT: 17.9 % (ref 11.6–15.1)
GFR SERPL CREATININE-BSD FRML MDRD: 63 ML/MIN/1.73SQ M
GLUCOSE P FAST SERPL-MCNC: 112 MG/DL (ref 65–99)
HCT VFR BLD AUTO: 30.5 % (ref 36.5–49.3)
HGB BLD-MCNC: 9.4 G/DL (ref 12–17)
IMM GRANULOCYTES # BLD AUTO: 0.04 THOUSAND/UL (ref 0–0.2)
IMM GRANULOCYTES NFR BLD AUTO: 0 % (ref 0–2)
LYMPHOCYTES # BLD AUTO: 0.72 THOUSANDS/ΜL (ref 0.6–4.47)
LYMPHOCYTES NFR BLD AUTO: 8 % (ref 14–44)
MCH RBC QN AUTO: 27.4 PG (ref 26.8–34.3)
MCHC RBC AUTO-ENTMCNC: 30.8 G/DL (ref 31.4–37.4)
MCV RBC AUTO: 89 FL (ref 82–98)
MONOCYTES # BLD AUTO: 0.7 THOUSAND/ΜL (ref 0.17–1.22)
MONOCYTES NFR BLD AUTO: 8 % (ref 4–12)
NEUTROPHILS # BLD AUTO: 7.4 THOUSANDS/ΜL (ref 1.85–7.62)
NEUTS SEG NFR BLD AUTO: 83 % (ref 43–75)
NRBC BLD AUTO-RTO: 0 /100 WBCS
PLATELET # BLD AUTO: 217 THOUSANDS/UL (ref 149–390)
PMV BLD AUTO: 10.5 FL (ref 8.9–12.7)
POTASSIUM SERPL-SCNC: 4.5 MMOL/L (ref 3.5–5.3)
PROT SERPL-MCNC: 6.4 G/DL (ref 6.4–8.2)
RBC # BLD AUTO: 3.43 MILLION/UL (ref 3.88–5.62)
SODIUM SERPL-SCNC: 135 MMOL/L (ref 136–145)
WBC # BLD AUTO: 8.99 THOUSAND/UL (ref 4.31–10.16)

## 2020-12-14 PROCEDURE — 82977 ASSAY OF GGT: CPT

## 2020-12-14 PROCEDURE — 36415 COLL VENOUS BLD VENIPUNCTURE: CPT

## 2020-12-14 PROCEDURE — 87040 BLOOD CULTURE FOR BACTERIA: CPT

## 2020-12-14 PROCEDURE — 80053 COMPREHEN METABOLIC PANEL: CPT

## 2020-12-14 PROCEDURE — 85025 COMPLETE CBC W/AUTO DIFF WBC: CPT

## 2020-12-14 RX ORDER — MIDODRINE HYDROCHLORIDE 10 MG/1
10 TABLET ORAL
Qty: 90 TABLET | Refills: 0 | Status: SHIPPED | OUTPATIENT
Start: 2020-12-14 | End: 2021-01-29 | Stop reason: SDUPTHER

## 2020-12-15 ENCOUNTER — TELEMEDICINE (OUTPATIENT)
Dept: INTERNAL MEDICINE CLINIC | Facility: CLINIC | Age: 60
End: 2020-12-15
Payer: COMMERCIAL

## 2020-12-15 ENCOUNTER — TELEPHONE (OUTPATIENT)
Dept: GASTROENTEROLOGY | Facility: CLINIC | Age: 60
End: 2020-12-15

## 2020-12-15 DIAGNOSIS — I95.0 IDIOPATHIC HYPOTENSION: ICD-10-CM

## 2020-12-15 DIAGNOSIS — K83.09 ACUTE CHOLANGITIS: ICD-10-CM

## 2020-12-15 DIAGNOSIS — D62 ACUTE BLOOD LOSS ANEMIA: ICD-10-CM

## 2020-12-15 DIAGNOSIS — E87.79 OTHER HYPERVOLEMIA: ICD-10-CM

## 2020-12-15 DIAGNOSIS — R74.8 BLOOD ALKALINE PHOSPHATASE INCREASED COMPARED WITH PRIOR MEASUREMENT: Primary | ICD-10-CM

## 2020-12-15 LAB — GGT SERPL-CCNC: 108 U/L (ref 5–85)

## 2020-12-15 PROCEDURE — 99214 OFFICE O/P EST MOD 30 MIN: CPT | Performed by: INTERNAL MEDICINE

## 2020-12-19 LAB — BACTERIA BLD CULT: NORMAL

## 2020-12-21 ENCOUNTER — LAB (OUTPATIENT)
Dept: LAB | Facility: CLINIC | Age: 60
End: 2020-12-21
Payer: MEDICARE

## 2020-12-21 DIAGNOSIS — K83.09 ACUTE CHOLANGITIS: ICD-10-CM

## 2020-12-21 DIAGNOSIS — R74.8 BLOOD ALKALINE PHOSPHATASE INCREASED COMPARED WITH PRIOR MEASUREMENT: ICD-10-CM

## 2020-12-21 DIAGNOSIS — D62 ACUTE BLOOD LOSS ANEMIA: ICD-10-CM

## 2020-12-21 LAB
ALBUMIN SERPL BCP-MCNC: 2.7 G/DL (ref 3.5–5)
ALP SERPL-CCNC: 421 U/L (ref 46–116)
ALT SERPL W P-5'-P-CCNC: 26 U/L (ref 12–78)
ANION GAP SERPL CALCULATED.3IONS-SCNC: 5 MMOL/L (ref 4–13)
AST SERPL W P-5'-P-CCNC: 38 U/L (ref 5–45)
BASOPHILS # BLD AUTO: 0.03 THOUSANDS/ΜL (ref 0–0.1)
BASOPHILS NFR BLD AUTO: 0 % (ref 0–1)
BILIRUB SERPL-MCNC: 0.42 MG/DL (ref 0.2–1)
BUN SERPL-MCNC: 13 MG/DL (ref 5–25)
CALCIUM ALBUM COR SERPL-MCNC: 10.6 MG/DL (ref 8.3–10.1)
CALCIUM SERPL-MCNC: 9.6 MG/DL (ref 8.3–10.1)
CHLORIDE SERPL-SCNC: 100 MMOL/L (ref 100–108)
CO2 SERPL-SCNC: 27 MMOL/L (ref 21–32)
CREAT SERPL-MCNC: 1.24 MG/DL (ref 0.6–1.3)
EOSINOPHIL # BLD AUTO: 0.11 THOUSAND/ΜL (ref 0–0.61)
EOSINOPHIL NFR BLD AUTO: 1 % (ref 0–6)
ERYTHROCYTE [DISTWIDTH] IN BLOOD BY AUTOMATED COUNT: 17.3 % (ref 11.6–15.1)
GFR SERPL CREATININE-BSD FRML MDRD: 63 ML/MIN/1.73SQ M
GGT SERPL-CCNC: 125 U/L (ref 5–85)
GLUCOSE SERPL-MCNC: 88 MG/DL (ref 65–140)
HCT VFR BLD AUTO: 30.9 % (ref 36.5–49.3)
HGB BLD-MCNC: 9.7 G/DL (ref 12–17)
IMM GRANULOCYTES # BLD AUTO: 0.04 THOUSAND/UL (ref 0–0.2)
IMM GRANULOCYTES NFR BLD AUTO: 1 % (ref 0–2)
LYMPHOCYTES # BLD AUTO: 0.74 THOUSANDS/ΜL (ref 0.6–4.47)
LYMPHOCYTES NFR BLD AUTO: 8 % (ref 14–44)
MCH RBC QN AUTO: 27.9 PG (ref 26.8–34.3)
MCHC RBC AUTO-ENTMCNC: 31.4 G/DL (ref 31.4–37.4)
MCV RBC AUTO: 89 FL (ref 82–98)
MONOCYTES # BLD AUTO: 0.54 THOUSAND/ΜL (ref 0.17–1.22)
MONOCYTES NFR BLD AUTO: 6 % (ref 4–12)
NEUTROPHILS # BLD AUTO: 7.31 THOUSANDS/ΜL (ref 1.85–7.62)
NEUTS SEG NFR BLD AUTO: 84 % (ref 43–75)
NRBC BLD AUTO-RTO: 0 /100 WBCS
PLATELET # BLD AUTO: 330 THOUSANDS/UL (ref 149–390)
PMV BLD AUTO: 10.1 FL (ref 8.9–12.7)
POTASSIUM SERPL-SCNC: 4.1 MMOL/L (ref 3.5–5.3)
PROT SERPL-MCNC: 6.7 G/DL (ref 6.4–8.2)
RBC # BLD AUTO: 3.48 MILLION/UL (ref 3.88–5.62)
SODIUM SERPL-SCNC: 132 MMOL/L (ref 136–145)
WBC # BLD AUTO: 8.77 THOUSAND/UL (ref 4.31–10.16)

## 2020-12-21 PROCEDURE — 87040 BLOOD CULTURE FOR BACTERIA: CPT

## 2020-12-21 PROCEDURE — 85025 COMPLETE CBC W/AUTO DIFF WBC: CPT

## 2020-12-21 PROCEDURE — 82977 ASSAY OF GGT: CPT

## 2020-12-21 PROCEDURE — 80053 COMPREHEN METABOLIC PANEL: CPT

## 2020-12-21 PROCEDURE — 36415 COLL VENOUS BLD VENIPUNCTURE: CPT

## 2020-12-22 ENCOUNTER — TELEPHONE (OUTPATIENT)
Dept: INTERNAL MEDICINE CLINIC | Facility: CLINIC | Age: 60
End: 2020-12-22

## 2020-12-22 ENCOUNTER — TELEPHONE (OUTPATIENT)
Dept: PALLIATIVE MEDICINE | Facility: CLINIC | Age: 60
End: 2020-12-22

## 2020-12-22 ENCOUNTER — TELEPHONE (OUTPATIENT)
Dept: GASTROENTEROLOGY | Facility: CLINIC | Age: 60
End: 2020-12-22

## 2020-12-22 ENCOUNTER — TELEMEDICINE (OUTPATIENT)
Dept: INTERNAL MEDICINE CLINIC | Facility: CLINIC | Age: 60
End: 2020-12-22
Payer: COMMERCIAL

## 2020-12-22 DIAGNOSIS — E87.1 HYPONATREMIA: ICD-10-CM

## 2020-12-22 DIAGNOSIS — S31.000A WOUND OF SACRAL REGION, INITIAL ENCOUNTER: Primary | ICD-10-CM

## 2020-12-22 DIAGNOSIS — I95.0 IDIOPATHIC HYPOTENSION: ICD-10-CM

## 2020-12-22 DIAGNOSIS — E43 SEVERE PROTEIN-CALORIE MALNUTRITION (HCC): ICD-10-CM

## 2020-12-22 PROBLEM — R29.898 WEAKNESS OF BOTH LEGS: Status: ACTIVE | Noted: 2020-12-22

## 2020-12-22 PROCEDURE — 99443 PR PHYS/QHP TELEPHONE EVALUATION 21-30 MIN: CPT | Performed by: INTERNAL MEDICINE

## 2020-12-22 RX ORDER — SODIUM CHLORIDE 1000 MG
0.5 TABLET, SOLUBLE MISCELLANEOUS 2 TIMES DAILY
Qty: 60 TABLET | Refills: 2 | Status: SHIPPED | OUTPATIENT
Start: 2020-12-22 | End: 2021-02-05 | Stop reason: ALTCHOICE

## 2020-12-23 ENCOUNTER — TELEMEDICINE (OUTPATIENT)
Dept: GASTROENTEROLOGY | Facility: CLINIC | Age: 60
End: 2020-12-23
Payer: COMMERCIAL

## 2020-12-23 ENCOUNTER — TELEPHONE (OUTPATIENT)
Dept: GASTROENTEROLOGY | Facility: CLINIC | Age: 60
End: 2020-12-23

## 2020-12-23 VITALS — HEIGHT: 72 IN | WEIGHT: 134.5 LBS | BODY MASS INDEX: 18.22 KG/M2

## 2020-12-23 DIAGNOSIS — K31.1 GASTRIC OUTLET OBSTRUCTION: ICD-10-CM

## 2020-12-23 DIAGNOSIS — C16.9 MALIGNANT NEOPLASM OF STOMACH, UNSPECIFIED LOCATION (HCC): ICD-10-CM

## 2020-12-23 DIAGNOSIS — C22.1 CHOLANGIOCARCINOMA (HCC): Primary | ICD-10-CM

## 2020-12-23 PROCEDURE — 99214 OFFICE O/P EST MOD 30 MIN: CPT | Performed by: INTERNAL MEDICINE

## 2020-12-24 ENCOUNTER — TELEPHONE (OUTPATIENT)
Dept: INTERNAL MEDICINE CLINIC | Facility: CLINIC | Age: 60
End: 2020-12-24

## 2020-12-24 ENCOUNTER — TELEPHONE (OUTPATIENT)
Dept: PALLIATIVE MEDICINE | Facility: CLINIC | Age: 60
End: 2020-12-24

## 2020-12-26 LAB — BACTERIA BLD CULT: NORMAL

## 2020-12-28 ENCOUNTER — APPOINTMENT (OUTPATIENT)
Dept: LAB | Facility: CLINIC | Age: 60
End: 2020-12-28
Payer: MEDICARE

## 2020-12-28 DIAGNOSIS — G93.40 ACUTE ENCEPHALOPATHY: ICD-10-CM

## 2020-12-28 DIAGNOSIS — E87.1 HYPONATREMIA: ICD-10-CM

## 2020-12-28 DIAGNOSIS — R78.81 GRAM-POSITIVE BACTEREMIA: ICD-10-CM

## 2020-12-28 DIAGNOSIS — B95.2 ENTEROCOCCUS FAECALIS INFECTION: ICD-10-CM

## 2020-12-28 LAB
ALBUMIN SERPL BCP-MCNC: 2.5 G/DL (ref 3.5–5)
ALP SERPL-CCNC: 332 U/L (ref 46–116)
ALT SERPL W P-5'-P-CCNC: 19 U/L (ref 12–78)
ANION GAP SERPL CALCULATED.3IONS-SCNC: 8 MMOL/L (ref 4–13)
AST SERPL W P-5'-P-CCNC: 44 U/L (ref 5–45)
BILIRUB SERPL-MCNC: 0.43 MG/DL (ref 0.2–1)
BUN SERPL-MCNC: 18 MG/DL (ref 5–25)
CALCIUM ALBUM COR SERPL-MCNC: 10.4 MG/DL (ref 8.3–10.1)
CALCIUM SERPL-MCNC: 9.2 MG/DL (ref 8.3–10.1)
CHLORIDE SERPL-SCNC: 97 MMOL/L (ref 100–108)
CO2 SERPL-SCNC: 26 MMOL/L (ref 21–32)
CREAT SERPL-MCNC: 1.09 MG/DL (ref 0.6–1.3)
ERYTHROCYTE [DISTWIDTH] IN BLOOD BY AUTOMATED COUNT: 17.5 % (ref 11.6–15.1)
GFR SERPL CREATININE-BSD FRML MDRD: 73 ML/MIN/1.73SQ M
GLUCOSE SERPL-MCNC: 102 MG/DL (ref 65–140)
HCT VFR BLD AUTO: 29.3 % (ref 36.5–49.3)
HGB BLD-MCNC: 9 G/DL (ref 12–17)
MCH RBC QN AUTO: 27.7 PG (ref 26.8–34.3)
MCHC RBC AUTO-ENTMCNC: 30.7 G/DL (ref 31.4–37.4)
MCV RBC AUTO: 90 FL (ref 82–98)
PLATELET # BLD AUTO: 304 THOUSANDS/UL (ref 149–390)
PMV BLD AUTO: 11.4 FL (ref 8.9–12.7)
POTASSIUM SERPL-SCNC: 4.2 MMOL/L (ref 3.5–5.3)
PROT SERPL-MCNC: 6.7 G/DL (ref 6.4–8.2)
RBC # BLD AUTO: 3.25 MILLION/UL (ref 3.88–5.62)
SODIUM SERPL-SCNC: 131 MMOL/L (ref 136–145)
WBC # BLD AUTO: 10.39 THOUSAND/UL (ref 4.31–10.16)

## 2020-12-28 PROCEDURE — 84443 ASSAY THYROID STIM HORMONE: CPT

## 2020-12-28 PROCEDURE — 36415 COLL VENOUS BLD VENIPUNCTURE: CPT

## 2020-12-28 PROCEDURE — 85027 COMPLETE CBC AUTOMATED: CPT

## 2020-12-28 PROCEDURE — 80053 COMPREHEN METABOLIC PANEL: CPT

## 2020-12-29 ENCOUNTER — TELEMEDICINE (OUTPATIENT)
Dept: INTERNAL MEDICINE CLINIC | Facility: CLINIC | Age: 60
End: 2020-12-29
Payer: COMMERCIAL

## 2020-12-29 DIAGNOSIS — G93.40 ACUTE ENCEPHALOPATHY: ICD-10-CM

## 2020-12-29 DIAGNOSIS — D62 ACUTE BLOOD LOSS ANEMIA: ICD-10-CM

## 2020-12-29 DIAGNOSIS — B95.2 ENTEROCOCCUS FAECALIS INFECTION: ICD-10-CM

## 2020-12-29 DIAGNOSIS — S31.000D WOUND OF SACRAL REGION, SUBSEQUENT ENCOUNTER: ICD-10-CM

## 2020-12-29 DIAGNOSIS — R74.8 BLOOD ALKALINE PHOSPHATASE INCREASED COMPARED WITH PRIOR MEASUREMENT: Primary | ICD-10-CM

## 2020-12-29 DIAGNOSIS — E87.1 HYPONATREMIA: ICD-10-CM

## 2020-12-29 LAB — TSH SERPL DL<=0.05 MIU/L-ACNC: 7.34 UIU/ML (ref 0.36–3.74)

## 2020-12-29 PROCEDURE — 99443 PR PHYS/QHP TELEPHONE EVALUATION 21-30 MIN: CPT | Performed by: INTERNAL MEDICINE

## 2020-12-30 DIAGNOSIS — C22.1 CHOLANGIOCARCINOMA (HCC): ICD-10-CM

## 2020-12-30 DIAGNOSIS — K31.1 GASTRIC OUTLET OBSTRUCTION: ICD-10-CM

## 2020-12-30 DIAGNOSIS — R11.0 NAUSEA: ICD-10-CM

## 2020-12-30 RX ORDER — HALOPERIDOL 0.5 MG/1
TABLET ORAL
Qty: 90 TABLET | Refills: 0 | Status: SHIPPED | OUTPATIENT
Start: 2020-12-30 | End: 2021-01-18

## 2021-01-04 ENCOUNTER — TRANSCRIBE ORDERS (OUTPATIENT)
Dept: ADMINISTRATIVE | Facility: HOSPITAL | Age: 61
End: 2021-01-04

## 2021-01-04 ENCOUNTER — OFFICE VISIT (OUTPATIENT)
Dept: WOUND CARE | Facility: HOSPITAL | Age: 61
End: 2021-01-04
Payer: MEDICARE

## 2021-01-04 VITALS — TEMPERATURE: 97.6 F | HEIGHT: 72 IN | RESPIRATION RATE: 20 BRPM | WEIGHT: 134.8 LBS | BODY MASS INDEX: 18.26 KG/M2

## 2021-01-04 DIAGNOSIS — L89.211 PRESSURE INJURY OF RIGHT HIP, STAGE 1: ICD-10-CM

## 2021-01-04 DIAGNOSIS — R14.0 ABDOMINAL DISTENSION (GASEOUS): ICD-10-CM

## 2021-01-04 DIAGNOSIS — C22.1 MALIGNANT NEOPLASM OF INTRAHEPATIC BILE DUCTS (HCC): ICD-10-CM

## 2021-01-04 DIAGNOSIS — L89.611 PRESSURE ULCER OF RIGHT HEEL, STAGE 1: Primary | ICD-10-CM

## 2021-01-04 DIAGNOSIS — I95.0 IDIOPATHIC HYPOTENSION: ICD-10-CM

## 2021-01-04 DIAGNOSIS — D62 ACUTE POSTHEMORRHAGIC ANEMIA: Primary | ICD-10-CM

## 2021-01-04 DIAGNOSIS — L89.221 PRESSURE INJURY OF LEFT HIP, STAGE 1: ICD-10-CM

## 2021-01-04 DIAGNOSIS — L89.621 PRESSURE ULCER OF LEFT HEEL, STAGE 1: ICD-10-CM

## 2021-01-04 DIAGNOSIS — E43 SEVERE PROTEIN-CALORIE MALNUTRITION (HCC): ICD-10-CM

## 2021-01-04 PROCEDURE — 99213 OFFICE O/P EST LOW 20 MIN: CPT | Performed by: NURSE PRACTITIONER

## 2021-01-04 PROCEDURE — 99203 OFFICE O/P NEW LOW 30 MIN: CPT | Performed by: NURSE PRACTITIONER

## 2021-01-04 NOTE — PROGRESS NOTES
Patient ID: Hany Zaman  is a 61 y o  male Date of Birth 1960     Chief Complaint  Chief Complaint   Patient presents with    New Patient Visit     wound Bilateral hips       Allergies  Amoxicillin-pot clavulanate, Dilaudid [hydromorphone hcl], Hydromorphone, and Nsaids    Assessment:     Diagnoses and all orders for this visit:    Pressure ulcer of right heel, stage 1  -     Wound cleansing and dressings; Future    Pressure ulcer of left heel, stage 1    Pressure injury of right hip, stage 1  -     Wound cleansing and dressings; Future    Pressure injury of left hip, stage 1  -     Wound cleansing and dressings; Future    Severe protein-calorie malnutrition ([facility] [address] )          Plan:  1  Initial visit  Patient does not have any open draining wounds at time of assessment, however patient does have multiple stage 1 pressure ulcers on his bilateral heels and bilateral hips  Recommend that patient's daughter apply hydroguard cream to all bony prominences 2-3 times per day and PRN for protection  She is also to continue to reposition Matt Mendoza frequently to offload pressure  Will try to order patient a Grade 1 gel/foam mattress overlay for mattress, if unable to get approved PCP may need to try to order overlay  Counseled daughter that Prevalon boots to offload patient's heels can be purchased online and a gel/foam seat cushion (not a donut cushion) can also be purchased online to help offload his sacrum and buttocks when he is OOB  Patient is discharged from the wound center today  Can follow up PRN  Wound 01/04/21 Pressure Injury Heel Right (Active)   Wound Image Images linked 01/04/21 1428   Wound Description Clean;Dry; Intact 01/04/21 1425   Pressure Injury Stage 1 01/04/21 1425   Caro-wound Assessment Clean;Dry; Intact 01/04/21 1425   Wound Length (cm) 3 2 cm 01/04/21 1425   Wound Width (cm) 2 7 cm 01/04/21 1425   Wound Depth (cm) 0 cm 01/04/21 1425   Wound Surface Area (cm^2) 8 64 cm^2 01/04/21 1425   Wound Volume (cm^3) 0 cm^3 01/04/21 1425   Calculated Wound Volume (cm^3) 0 cm^3 01/04/21 1425   Drainage Amount None 01/04/21 1425   Non-staged Wound Description Not applicable 07/60/29 8648       Wound 01/04/21 Pressure Injury Heel Left (Active)   Wound Image Images linked 01/04/21 1429   Wound Description Clean;Dry; Intact 01/04/21 1428   Pressure Injury Stage 1 01/04/21 1428   Caro-wound Assessment Clean;Dry; Intact 01/04/21 1428   Wound Length (cm) 0 5 cm 01/04/21 1428   Wound Width (cm) 0 1 cm 01/04/21 1428   Wound Depth (cm) 0 cm 01/04/21 1428   Wound Surface Area (cm^2) 0 05 cm^2 01/04/21 1428   Wound Volume (cm^3) 0 cm^3 01/04/21 1428   Calculated Wound Volume (cm^3) 0 cm^3 01/04/21 1428   Drainage Amount None 01/04/21 1428   Non-staged Wound Description Not applicable 01/70/74 0831       Wound 01/04/21 Pressure Injury Hip Anterior;Proximal;Right (Active)   Wound Image Images linked 01/04/21 1436   Wound Description Clean;Dry; Intact 01/04/21 1436   Pressure Injury Stage 1 01/04/21 1436   Caro-wound Assessment Clean;Dry; Intact 01/04/21 1436   Wound Length (cm) 2 5 cm 01/04/21 1436   Wound Width (cm) 1 2 cm 01/04/21 1436   Wound Depth (cm) 0 cm 01/04/21 1436   Wound Surface Area (cm^2) 3 cm^2 01/04/21 1436   Wound Volume (cm^3) 0 cm^3 01/04/21 1436   Calculated Wound Volume (cm^3) 0 cm^3 01/04/21 1436   Drainage Amount None 01/04/21 1436   Non-staged Wound Description Not applicable 10/41/87 1040       Wound 01/04/21 Pressure Injury Hip Anterior;Left;Proximal (Active)   Wound Image Images linked 01/04/21 1440   Wound Description Clean;Dry; Intact 01/04/21 1440   Pressure Injury Stage 1 01/04/21 1440   Caro-wound Assessment Clean;Dry; Intact 01/04/21 1440   Wound Length (cm) 6 5 cm 01/04/21 1440   Wound Width (cm) 3 5 cm 01/04/21 1440   Wound Depth (cm) 0 cm 01/04/21 1440   Wound Surface Area (cm^2) 22 75 cm^2 01/04/21 1440   Wound Volume (cm^3) 0 cm^3 01/04/21 1440   Calculated Wound Volume (cm^3) 0 cm^3 01/04/21 1440 Drainage Amount None 01/04/21 1440   Non-staged Wound Description Not applicable 66/66/67 3049       Wound 01/04/21 Pressure Injury Heel Right (Active)   Date First Assessed/Time First Assessed: 01/04/21 1424   Primary Wound Type: Pressure Injury  Location: Heel  Wound Location Orientation: Right  Wound Description (Comments): stage 1  Dressing Status: Open to air       Wound 01/04/21 Pressure Injury Heel Left (Active)   Date First Assessed/Time First Assessed: 01/04/21 1427   Pre-Existing Wound: Yes  Primary Wound Type: Pressure Injury  Location: Heel  Wound Location Orientation: Left  Wound Description (Comments): stage 1  Dressing Status: Open to air       Wound 01/04/21 Pressure Injury Hip Anterior;Proximal;Right (Active)   Date First Assessed/Time First Assessed: 01/04/21 1431   Pre-Existing Wound: Yes  Primary Wound Type: Pressure Injury  Location: Hip  Wound Location Orientation: Anterior;Proximal;Right       Wound 01/04/21 Pressure Injury Hip Anterior;Left;Proximal (Active)   Date First Assessed/Time First Assessed: 01/04/21 1438   Pre-Existing Wound: Yes  Primary Wound Type: Pressure Injury  Location: Hip  Wound Location Orientation: Anterior;Left;Proximal  Dressing Status: Open to air       Subjective:     Patient is a 61year old male who presents to the [facility] wound center as a new patient for multiple stage 1 pressure ulcers on his bilateral heels and his bilateral hips  Patient's daughter who is a nurse is present with patient today reports that her father developed these pressure injuries during his last hospitalization  She reports patient was fluid overloaded in the ICU and was not properly repositioned frequently in bed  His pressure injuries were not discovered until after patient was diuresed  She reports she was previously managing his wounds at home with Aquacel and bordered foam dressings which she purchased online   She reports his wounds have improved since being discharged from the hospital  Patient currently has no open/draining wounds  Patient has reddened areas present on his bilateral hips and heels  He denies any pain, fevers, or chills  The following portions of the patient's history were reviewed and updated as appropriate:   He  has a past medical history of Cholangiocarcinoma ([facility] ), Gastric carcinoma ([place] [address] ) (06/2020), SHELLIE (obstructive sleep apnea), Peptic ulcer, and Zollinger-Waddell syndrome  He   Patient Active Problem List    Diagnosis Date Noted    Sacral wound 12/22/2020    Weakness of both legs 12/22/2020    Diarrhea, unspecified 12/01/2020    Blood alkaline phosphatase increased compared with prior measurement 11/24/2020    Gastric cancer ([place] [address] ) 11/24/2020    Glossitis 11/10/2020    Renal osteodystrophy 11/10/2020    CKD (chronic kidney disease) 11/10/2020    Urinary retention 11/10/2020    Fluid overload 11/09/2020    Cancer related pain 11/09/2020    Hypotension 11/09/2020    Anxiety and depression 11/09/2020    Enterococcus faecalis bacteremia 11/08/2020    Acute kidney injury (ALMAS) with acute tubular necrosis (ATN) ([place] [address] ) 11/08/2020    Mild intermittent asthma without complication 66/07/9768    Protein calorie malnutrition ([place] [address] ) 11/02/2020    Acute blood loss anemia 11/01/2020    SIRS (systemic inflammatory response syndrome) ([place] [address] ) 10/31/2020    Acute cholangitis 15/76/4028    Uncomplicated opioid dependence ([place] [address] ) 10/30/2020    Gastric outlet obstruction 10/29/2020    History of pulmonary embolism 07/14/2018    Hyponatremia 07/14/2018    Fever 07/14/2018    Acute encephalopathy 07/14/2018    Cholangiocarcinoma ([place] [address] )     SHELLIE (obstructive sleep apnea)      He  has a past surgical history that includes Stomach surgery; Liver resection; Colonoscopy; Upper gastrointestinal endoscopy; Appendectomy; and Cholecystectomy  His family history includes Alcohol abuse in his father; Arthritis in his mother; Cancer in his mother;  Thyroid disease in his mother; Vision loss in his maternal grandmother and mother  He  reports that he has never smoked  He has never used smokeless tobacco  He reports that he does not drink alcohol or use drugs    Current Outpatient Medications   Medication Sig Dispense Refill    furosemide (LASIX) 40 mg tablet Take 1 tablet (40 mg total) by mouth daily (Patient taking differently: Take 40 mg by mouth as needed ) 30 tablet 2    potassium chloride (K-DUR,KLOR-CON) 10 mEq tablet Take 1 tablet (10 mEq total) by mouth daily (Patient taking differently: Take 10 mEq by mouth as needed ) 30 tablet 5    acamprosate (CAMPRAL) 333 mg tablet Take 666 mg by mouth 3 (three) times a day      albuterol (2 5 mg/3 mL) 0 083 % nebulizer solution Take 1 vial (2 5 mg total) by nebulization every 6 (six) hours as needed for wheezing or shortness of breath 90 vial 0    buPROPion (WELLBUTRIN SR) 150 mg 12 hr tablet Take 1 tablet (150 mg total) by mouth 3 (three) times a day 2 TABS IN AM ONE IN PM 90 tablet 3    clonazePAM (KlonoPIN) 0 5 mg tablet Take 1/2 tablet (0 25 mg) BID PRN for anxiety and 1 tablet (0 5 mg) QHS 60 tablet 0    fentaNYL (DURAGESIC) 100 mcg/hr TD 72 hr patch Place 1 patch on the skin every third dayMax Daily Amount: 1 patch 10 patch 0    fentaNYL (DURAGESIC) 50 mcg/hr Place 1 patch on the skin every third dayMax Daily Amount: 1 patch 10 patch 0    gabapentin (NEURONTIN) 300 mg capsule Take 300 mg by mouth 2 (two) times a day      haloperidol (HALDOL) 0 5 mg tablet TAKE 1 TABLET BY MOUTH EVERY 6 HOURS AS NEEDED FOR NAUSEA AND VOMITTING 90 tablet 0    hyoscyamine (LEVSIN) 0 125 MG/5ML ELIX Take 125 mcg by mouth every 4 (four) hours as needed for bladder spasms      levocetirizine (XYZAL) 5 MG tablet Take 10 mg by mouth daily as needed       midodrine (PROAMATINE) 10 MG tablet TAKE 1 TABLET (10 MG TOTAL) BY MOUTH 3 (THREE) TIMES A DAY BEFORE MEALS 90 tablet 0    milk thistle 175 MG tablet Take 175 mg by mouth 2 (two) times a day      montelukast (SINGULAIR) 10 mg tablet Take 10 mg by mouth daily at bedtime      naloxone (NARCAN) 4 mg/0 1 mL nasal spray Administer 1 spray into a nostril  If no response after 2-3 minutes, give another dose in the other nostril using a new spray  1 each 1    oxyCODONE (ROXICODONE) 20 MG TABS Take 1 tablet (20 mg total) by mouth every 4 (four) hours as needed for moderate pain (Cancer-related pain)Max Daily Amount: 120 mg 180 tablet 0    pantoprazole (PROTONIX) 40 mg tablet Take 80 mg by mouth 2 (two) times a day       rivaroxaban (XARELTO) 10 mg tablet Take 1 tablet (10 mg total) by mouth daily 90 tablet 0    sodium chloride 1 g tablet Take 0 5 tablets (0 5 g total) by mouth 2 (two) times a day Please crush in applesauce when giving hold tablets if weight more than 143 pounds 60 tablet 2    traZODone (DESYREL) 150 mg tablet Take 150 mg by mouth daily at bedtime       No current facility-administered medications for this visit  He is allergic to amoxicillin-pot clavulanate; dilaudid [hydromorphone hcl]; hydromorphone; and nsaids       Review of Systems   Constitutional: Negative  HENT: Negative for ear pain and hearing loss  Eyes: Negative for pain  Respiratory: Negative for chest tightness and shortness of breath  Cardiovascular: Negative for chest pain, palpitations and leg swelling  Gastrointestinal: Negative for diarrhea, nausea and vomiting  Genitourinary: Negative for dysuria  Musculoskeletal: Negative for gait problem  Skin: Positive for wound  Neurological: Negative for tremors and weakness  Psychiatric/Behavioral: Negative for behavioral problems, confusion and suicidal ideas  Objective:       Wound 01/04/21 Pressure Injury Heel Right (Active)   Wound Image Images linked 01/04/21 1428   Wound Description Clean;Dry; Intact 01/04/21 1425   Pressure Injury Stage 1 01/04/21 1425   Caro-wound Assessment Clean;Dry; Intact 01/04/21 1425   Wound Length (cm) 3 2 cm 01/04/21 1425   Wound Width (cm) 2 7 cm 01/04/21 1425   Wound Depth (cm) 0 cm 01/04/21 1425   Wound Surface Area (cm^2) 8 64 cm^2 01/04/21 1425   Wound Volume (cm^3) 0 cm^3 01/04/21 1425   Calculated Wound Volume (cm^3) 0 cm^3 01/04/21 1425   Drainage Amount None 01/04/21 1425   Non-staged Wound Description Not applicable 34/44/26 0175       Wound 01/04/21 Pressure Injury Heel Left (Active)   Wound Image Images linked 01/04/21 1429   Wound Description Clean;Dry; Intact 01/04/21 1428   Pressure Injury Stage 1 01/04/21 1428   Caro-wound Assessment Clean;Dry; Intact 01/04/21 1428   Wound Length (cm) 0 5 cm 01/04/21 1428   Wound Width (cm) 0 1 cm 01/04/21 1428   Wound Depth (cm) 0 cm 01/04/21 1428   Wound Surface Area (cm^2) 0 05 cm^2 01/04/21 1428   Wound Volume (cm^3) 0 cm^3 01/04/21 1428   Calculated Wound Volume (cm^3) 0 cm^3 01/04/21 1428   Drainage Amount None 01/04/21 1428   Non-staged Wound Description Not applicable 02/81/13 4443       Wound 01/04/21 Pressure Injury Hip Anterior;Proximal;Right (Active)   Wound Image Images linked 01/04/21 1436   Wound Description Clean;Dry; Intact 01/04/21 1436   Pressure Injury Stage 1 01/04/21 1436   Caro-wound Assessment Clean;Dry; Intact 01/04/21 1436   Wound Length (cm) 2 5 cm 01/04/21 1436   Wound Width (cm) 1 2 cm 01/04/21 1436   Wound Depth (cm) 0 cm 01/04/21 1436   Wound Surface Area (cm^2) 3 cm^2 01/04/21 1436   Wound Volume (cm^3) 0 cm^3 01/04/21 1436   Calculated Wound Volume (cm^3) 0 cm^3 01/04/21 1436   Drainage Amount None 01/04/21 1436   Non-staged Wound Description Not applicable 22/15/55 2502       Wound 01/04/21 Pressure Injury Hip Anterior;Left;Proximal (Active)   Wound Image Images linked 01/04/21 1440   Wound Description Clean;Dry; Intact 01/04/21 1440   Pressure Injury Stage 1 01/04/21 1440   Caro-wound Assessment Clean;Dry; Intact 01/04/21 1440   Wound Length (cm) 6 5 cm 01/04/21 1440   Wound Width (cm) 3 5 cm 01/04/21 1440   Wound Depth (cm) 0 cm 01/04/21 1440   Wound Surface Area (cm^2) 22 75 cm^2 01/04/21 1440   Wound Volume (cm^3) 0 cm^3 01/04/21 1440   Calculated Wound Volume (cm^3) 0 cm^3 01/04/21 1440   Drainage Amount None 01/04/21 1440   Non-staged Wound Description Not applicable 37/56/50 9696       Temp 97 6 °F (36 4 °C)   Resp 20   Ht 6' (1 829 m)   Wt 61 1 kg (134 lb 12 8 oz)   BMI 18 28 kg/m²     Physical Exam  Constitutional:       General: He is not in acute distress  Appearance: Normal appearance  HENT:      Head: Normocephalic and atraumatic  Eyes:      General:         Right eye: No discharge  Left eye: No discharge  Neck:      Musculoskeletal: Normal range of motion and neck supple  No neck rigidity  Pulmonary:      Effort: Pulmonary effort is normal  No respiratory distress  Musculoskeletal: Normal range of motion  Right lower leg: No edema  Left lower leg: No edema  Skin:     General: Skin is warm and dry  Findings: Erythema present  Comments: Erythema present on bilateral heels and hips from pressure   Neurological:      General: No focal deficit present  Mental Status: He is alert and oriented to person, place, and time  Mental status is at baseline  Psychiatric:         Mood and Affect: Mood normal          Behavior: Behavior normal          Thought Content: Thought content normal          Judgment: Judgment normal            Wound Instructions:  Orders Placed This Encounter   Procedures    Wound cleansing and dressings     All wounds  Wash your hands with soap and water  Remove old dressing, discard into plastic bag and place in trash  Cleanse the wound with soap and water prior to applying a clean dressing  Do not use tissue or cotton balls  Do not scrub the wound  Pat dry using gauze  Shower yes   Apply hydraguard cream to skin surrounding wound  Apply hydraguard cream to the  wound      This was done today     Standing Status:   Future     Standing Expiration Date:   1/4/2022 Diagnosis ICD-10-CM Associated Orders   1  Pressure ulcer of right heel, stage 1  L89 611 Wound cleansing and dressings   2  Pressure ulcer of left heel, stage 1  L89 621    3  Pressure injury of right hip, stage 1  L89 211 Wound cleansing and dressings   4  Pressure injury of left hip, stage 1  L89 221 Wound cleansing and dressings   5   Severe protein[facility]calorie malnutrition ([facility] )  E43

## 2021-01-04 NOTE — PATIENT INSTRUCTIONS
Orders Placed This Encounter   Procedures    Wound cleansing and dressings     All wounds  Wash your hands with soap and water  Remove old dressing, discard into plastic bag and place in trash  Cleanse the wound with soap and water prior to applying a clean dressing  Do not use tissue or cotton balls  Do not scrub the wound  Pat dry using gauze  Shower yes   Apply hydraguard cream to skin surrounding wound  Apply hydraguard cream to the  wound      This was done today     Standing Status:   Future     Standing Expiration Date:   1/4/2022

## 2021-01-05 ENCOUNTER — TELEMEDICINE (OUTPATIENT)
Dept: INTERNAL MEDICINE CLINIC | Facility: CLINIC | Age: 61
End: 2021-01-05
Payer: MEDICARE

## 2021-01-05 DIAGNOSIS — R74.8 BLOOD ALKALINE PHOSPHATASE INCREASED COMPARED WITH PRIOR MEASUREMENT: ICD-10-CM

## 2021-01-05 DIAGNOSIS — E87.1 HYPONATREMIA: ICD-10-CM

## 2021-01-05 DIAGNOSIS — Z20.822 SUSPECTED COVID-19 VIRUS INFECTION: Primary | ICD-10-CM

## 2021-01-05 DIAGNOSIS — Z20.822 SUSPECTED COVID-19 VIRUS INFECTION: ICD-10-CM

## 2021-01-05 PROBLEM — E83.52 HYPERCALCEMIA: Status: ACTIVE | Noted: 2021-01-05

## 2021-01-05 PROCEDURE — 99442 PR PHYS/QHP TELEPHONE EVALUATION 11-20 MIN: CPT | Performed by: INTERNAL MEDICINE

## 2021-01-05 PROCEDURE — U0003 INFECTIOUS AGENT DETECTION BY NUCLEIC ACID (DNA OR RNA); SEVERE ACUTE RESPIRATORY SYNDROME CORONAVIRUS 2 (SARS-COV-2) (CORONAVIRUS DISEASE [COVID-19]), AMPLIFIED PROBE TECHNIQUE, MAKING USE OF HIGH THROUGHPUT TECHNOLOGIES AS DESCRIBED BY CMS-2020-01-R: HCPCS | Performed by: INTERNAL MEDICINE

## 2021-01-05 NOTE — ASSESSMENT & PLAN NOTE
Continue with sodium chloride tablets 1 gram daily depending on level of nausea  His sodium has improved to 133  Can encourage oral hydration with concomitant sodium intake

## 2021-01-05 NOTE — ASSESSMENT & PLAN NOTE
Corrected calcium is 10 8  Certainly there is a history of underlying malignancies that are active but I think the increase may be more related to subtle decreased effective arterial blood volume  Encourage a little more oral hydration  Continue with salt tabs as able to take

## 2021-01-05 NOTE — PROGRESS NOTES
Virtual Brief Visit    Assessment/Plan:    Problem List Items Addressed This Visit        Other    Hyponatremia     Continue with sodium chloride tablets 1 gram daily depending on level of nausea  His sodium has improved to 133  Can encourage oral hydration with concomitant sodium intake  Blood alkaline phosphatase increased compared with prior measurement     The patient his alkaline phosphatase levels had been stable between 300-400  Continue to monitor  Check labs next week  His labs have been stable  He recently saw GI Dr Marisel Pisano - appreciate input  No acute intervention needed right now  Suspected COVID-19 virus infection - Primary     Will check COVID-19 test  The patient and her child will also be tested as well  The only symptom is fatigue  He is at risk for developing worsening symptoms given his other co-morbid conditions  Follow-up on lab results  Relevant Orders    Novel Coronavirus (COVID-19), PCR [facility] Collected at   [facility]  or [facility]            Follow up one week if COVID test is positive will follow-up sooner  Reason for visit is No chief complaint on file  Encounter provider Denise Franks DO    Provider located at [address] [place] [id number]    Recent Visits  Date Type Provider Dept   12/29/20 Al 328, DO  [facility]   Showing recent visits within past 7 days and meeting all other requirements     Today's Visits  Date Type Provider Dept   01/05/21 Al Ang, DO  [facility] [place]   Showing today's visits and meeting all other requirements     Future Appointments  No visits were found meeting these conditions  Showing future appointments within next 150 days and meeting all other requirements        After connecting through telephone, the patient was identified by name and date of birth   [facility]  was informed that this is a telemedicine visit and that the visit is being conducted through telephone  My office door was closed  No one else was in the room  He acknowledged consent and understanding of privacy and security of the platform  The patient has agreed to participate and understands he can discontinue the visit at any time  Patient is aware this is a billable service  Madisyn Myers  is a 61 y o  male     HPI     He has been having increased fatigue the last 2 days as well as increased nausea  He did not have any exertional dyspnea, altered taste or diarrhea  Weight stable at 137 pounds was 138 last week  Concern about possible COVID-19 infection  He has been taking the sodium chloride tablets most days       Past Medical History:   Diagnosis Date    [facility] [address] [facility]     [facility]    SHELLIE (obstructive sleep apnea)     Peptic ulcer     Zollinger-Waddell syndrome        Past Surgical History:   Procedure Laterality Date    APPENDECTOMY      CHOLECYSTECTOMY      COLONOSCOPY      LIVER RESECTION      STOMACH SURGERY      UPPER GASTROINTESTINAL ENDOSCOPY         Current Outpatient Medications   Medication Sig Dispense Refill    acamprosate (CAMPRAL) 333 mg tablet Take 666 mg by mouth 3 (three) times a day      albuterol (2 5 mg/3 mL) 0 083 % nebulizer solution Take 1 vial (2 5 mg total) by nebulization every 6 (six) hours as needed for wheezing or shortness of breath 90 vial 0    buPROPion (WELLBUTRIN SR) 150 mg 12 hr tablet Take 1 tablet (150 mg total) by mouth 3 (three) times a day 2 TABS IN AM ONE IN PM 90 tablet 3    clonazePAM (KlonoPIN) 0 5 mg tablet Take 1/2 tablet (0 25 mg) BID PRN for anxiety and 1 tablet (0 5 mg) QHS 60 tablet 0    fentaNYL (DURAGESIC) 100 mcg/hr TD 72 hr patch Place 1 patch on the skin every third dayMax Daily Amount: 1 patch 10 patch 0    fentaNYL (DURAGESIC) 50 mcg/hr Place 1 patch on the skin every third dayMax Daily Amount: 1 patch 10 patch 0    furosemide (LASIX) 40 mg tablet Take 1 tablet (40 mg total) by mouth daily (Patient taking differently: Take 40 mg by mouth as needed ) 30 tablet 2    gabapentin (NEURONTIN) 300 mg capsule Take 300 mg by mouth 2 (two) times a day      haloperidol (HALDOL) 0 5 mg tablet TAKE 1 TABLET BY MOUTH EVERY 6 HOURS AS NEEDED FOR NAUSEA AND VOMITTING 90 tablet 0    hyoscyamine (LEVSIN) 0 125 MG/5ML ELIX Take 125 mcg by mouth every 4 (four) hours as needed for bladder spasms      levocetirizine (XYZAL) 5 MG tablet Take 10 mg by mouth daily as needed       midodrine (PROAMATINE) 10 MG tablet TAKE 1 TABLET (10 MG TOTAL) BY MOUTH 3 (THREE) TIMES A DAY BEFORE MEALS 90 tablet 0    milk thistle 175 MG tablet Take 175 mg by mouth 2 (two) times a day      montelukast (SINGULAIR) 10 mg tablet Take 10 mg by mouth daily at bedtime      naloxone (NARCAN) 4 mg/0 1 mL nasal spray Administer 1 spray into a nostril  If no response after 2-3 minutes, give another dose in the other nostril using a new spray  1 each 1    oxyCODONE (ROXICODONE) 20 MG TABS Take 1 tablet (20 mg total) by mouth every 4 (four) hours as needed for moderate pain (Cancer-related pain)Max Daily Amount: 120 mg 180 tablet 0    pantoprazole (PROTONIX) 40 mg tablet Take 80 mg by mouth 2 (two) times a day       potassium chloride (K-DUR,KLOR-CON) 10 mEq tablet Take 1 tablet (10 mEq total) by mouth daily (Patient taking differently: Take 10 mEq by mouth as needed ) 30 tablet 5    rivaroxaban (XARELTO) 10 mg tablet Take 1 tablet (10 mg total) by mouth daily 90 tablet 0    sodium chloride 1 g tablet Take 0 5 tablets (0 5 g total) by mouth 2 (two) times a day Please crush in applesauce when giving hold tablets if weight more than 143 pounds 60 tablet 2    traZODone (DESYREL) 150 mg tablet Take 150 mg by mouth daily at bedtime       No current facility-administered medications for this visit           Allergies   Allergen Reactions    Amoxicillin-Pot Clavulanate GI Intolerance    Dilaudid [Hydromorphone Hcl] Other (See Comments)     Patient develops agitation and confusion   Hydromorphone      Severe hallucinations    Nsaids GI Intolerance       Review of Systems no chest pain no sob positive nausea no diarrhea positive fatigue mild leg edema no anosmia no dizziness  There were no vitals filed for this visit  I spent 20 minutes directly with the patient during this visit    [address]  acknowledges that he has consented to an online visit or consultation  He understands that the online visit is based solely on information provided by him, and that, in the absence of a face-to-face physical evaluation by the physician, the diagnosis he receives is both limited and provisional in terms of accuracy and completeness  This is not intended to replace a full medical face-to-face evaluation by the physician  Drake Alberto  understands and accepts these terms

## 2021-01-05 NOTE — ASSESSMENT & PLAN NOTE
The patient his alkaline phosphatase levels had been stable between 300-400  Continue to monitor  Check labs next week  His labs have been stable  He recently saw GI Dr Aba Linton - appreciate input  No acute intervention needed right now

## 2021-01-05 NOTE — ASSESSMENT & PLAN NOTE
Will check COVID-19 test  The patient and her child will also be tested as well  The only symptom is fatigue  He is at risk for developing worsening symptoms given his other co-morbid conditions  Follow-up on lab results

## 2021-01-05 NOTE — PATIENT INSTRUCTIONS
1  Thank you for talking with me on the phone today  2  We will order the COVID test and follow-up on lab results       3  Please maintain quarantine at home, socially distance, wear mask pending the results of the test

## 2021-01-06 ENCOUNTER — TELEPHONE (OUTPATIENT)
Dept: INTERNAL MEDICINE CLINIC | Facility: CLINIC | Age: 61
End: 2021-01-06

## 2021-01-06 DIAGNOSIS — C22.1 CHOLANGIOCARCINOMA (HCC): ICD-10-CM

## 2021-01-06 DIAGNOSIS — K31.1 GASTRIC OUTLET OBSTRUCTION: ICD-10-CM

## 2021-01-06 DIAGNOSIS — G47.00 INSOMNIA, UNSPECIFIED TYPE: Primary | ICD-10-CM

## 2021-01-06 DIAGNOSIS — G89.3 NEOPLASM RELATED PAIN: ICD-10-CM

## 2021-01-06 DIAGNOSIS — Z20.822 SUSPECTED COVID-19 VIRUS INFECTION: Primary | ICD-10-CM

## 2021-01-06 LAB — SARS-COV-2 RNA SPEC QL NAA+PROBE: NOT DETECTED

## 2021-01-06 RX ORDER — FENTANYL 100 UG/H
1 PATCH TRANSDERMAL
Qty: 10 PATCH | Refills: 0 | Status: SHIPPED | OUTPATIENT
Start: 2021-01-06 | End: 2021-02-05 | Stop reason: ALTCHOICE

## 2021-01-06 RX ORDER — TRAZODONE HYDROCHLORIDE 150 MG/1
150 TABLET ORAL
Qty: 90 TABLET | Refills: 0 | Status: SHIPPED | OUTPATIENT
Start: 2021-01-06 | End: 2021-02-05 | Stop reason: ALTCHOICE

## 2021-01-06 RX ORDER — TRAZODONE HYDROCHLORIDE 150 MG/1
150 TABLET ORAL
Qty: 30 TABLET | Refills: 0 | Status: SHIPPED | OUTPATIENT
Start: 2021-01-06 | End: 2021-01-06 | Stop reason: SDUPTHER

## 2021-01-06 RX ORDER — FENTANYL 50 UG/H
1 PATCH TRANSDERMAL
Qty: 10 PATCH | Refills: 0 | Status: SHIPPED | OUTPATIENT
Start: 2021-01-06 | End: 2021-02-05 | Stop reason: ALTCHOICE

## 2021-01-06 NOTE — TELEPHONE ENCOUNTER
Next appt 1/15  George Mccabe, Pt's daughter, calling to request a 90 day supply of Trazadone for insurance purposes

## 2021-01-06 NOTE — TELEPHONE ENCOUNTER
I did follow-up call regarding test results - COVID 19 negative but symptoms began on Sunday  Retest on Friday  I spoke with daughter she is aware for retesting on Friday

## 2021-01-11 ENCOUNTER — TELEPHONE (OUTPATIENT)
Dept: OTHER | Facility: OTHER | Age: 61
End: 2021-01-11

## 2021-01-11 ENCOUNTER — APPOINTMENT (OUTPATIENT)
Dept: LAB | Facility: CLINIC | Age: 61
End: 2021-01-11
Payer: MEDICARE

## 2021-01-11 ENCOUNTER — TELEPHONE (OUTPATIENT)
Dept: OTHER | Facility: HOSPITAL | Age: 61
End: 2021-01-11

## 2021-01-11 DIAGNOSIS — Z20.822 SUSPECTED COVID-19 VIRUS INFECTION: ICD-10-CM

## 2021-01-11 DIAGNOSIS — D62 ACUTE BLOOD LOSS ANEMIA: Primary | ICD-10-CM

## 2021-01-11 DIAGNOSIS — I95.0 IDIOPATHIC HYPOTENSION: ICD-10-CM

## 2021-01-11 DIAGNOSIS — D62 ACUTE BLOOD LOSS ANEMIA: ICD-10-CM

## 2021-01-11 LAB
ALBUMIN SERPL BCP-MCNC: 2.2 G/DL (ref 3.5–5)
ALP SERPL-CCNC: 376 U/L (ref 46–116)
ALT SERPL W P-5'-P-CCNC: 16 U/L (ref 12–78)
ANION GAP SERPL CALCULATED.3IONS-SCNC: 8 MMOL/L (ref 4–13)
AST SERPL W P-5'-P-CCNC: 33 U/L (ref 5–45)
BASOPHILS # BLD AUTO: 0.02 THOUSANDS/ΜL (ref 0–0.1)
BASOPHILS NFR BLD AUTO: 0 % (ref 0–1)
BILIRUB SERPL-MCNC: 0.48 MG/DL (ref 0.2–1)
BUN SERPL-MCNC: 20 MG/DL (ref 5–25)
CALCIUM ALBUM COR SERPL-MCNC: 9.8 MG/DL (ref 8.3–10.1)
CALCIUM SERPL-MCNC: 8.4 MG/DL (ref 8.3–10.1)
CHLORIDE SERPL-SCNC: 97 MMOL/L (ref 100–108)
CO2 SERPL-SCNC: 24 MMOL/L (ref 21–32)
CREAT SERPL-MCNC: 1.2 MG/DL (ref 0.6–1.3)
EOSINOPHIL # BLD AUTO: 0.03 THOUSAND/ΜL (ref 0–0.61)
EOSINOPHIL NFR BLD AUTO: 0 % (ref 0–6)
ERYTHROCYTE [DISTWIDTH] IN BLOOD BY AUTOMATED COUNT: 18.3 % (ref 11.6–15.1)
GFR SERPL CREATININE-BSD FRML MDRD: 65 ML/MIN/1.73SQ M
GLUCOSE SERPL-MCNC: 149 MG/DL (ref 65–140)
HCT VFR BLD AUTO: 24.5 % (ref 36.5–49.3)
HGB BLD-MCNC: 7.5 G/DL (ref 12–17)
IMM GRANULOCYTES # BLD AUTO: 0.07 THOUSAND/UL (ref 0–0.2)
IMM GRANULOCYTES NFR BLD AUTO: 1 % (ref 0–2)
LACTATE SERPL-SCNC: 3.2 MMOL/L (ref 0.5–2)
LYMPHOCYTES # BLD AUTO: 0.63 THOUSANDS/ΜL (ref 0.6–4.47)
LYMPHOCYTES NFR BLD AUTO: 5 % (ref 14–44)
MAGNESIUM SERPL-MCNC: 1.8 MG/DL (ref 1.6–2.6)
MCH RBC QN AUTO: 27.9 PG (ref 26.8–34.3)
MCHC RBC AUTO-ENTMCNC: 30.6 G/DL (ref 31.4–37.4)
MCV RBC AUTO: 91 FL (ref 82–98)
MONOCYTES # BLD AUTO: 0.88 THOUSAND/ΜL (ref 0.17–1.22)
MONOCYTES NFR BLD AUTO: 7 % (ref 4–12)
NEUTROPHILS # BLD AUTO: 10.4 THOUSANDS/ΜL (ref 1.85–7.62)
NEUTS SEG NFR BLD AUTO: 87 % (ref 43–75)
NRBC BLD AUTO-RTO: 0 /100 WBCS
PLATELET # BLD AUTO: 483 THOUSANDS/UL (ref 149–390)
PMV BLD AUTO: 9.9 FL (ref 8.9–12.7)
POTASSIUM SERPL-SCNC: 4 MMOL/L (ref 3.5–5.3)
PROT SERPL-MCNC: 6.1 G/DL (ref 6.4–8.2)
RBC # BLD AUTO: 2.69 MILLION/UL (ref 3.88–5.62)
SODIUM SERPL-SCNC: 129 MMOL/L (ref 136–145)
WBC # BLD AUTO: 12.03 THOUSAND/UL (ref 4.31–10.16)

## 2021-01-11 PROCEDURE — 83605 ASSAY OF LACTIC ACID: CPT

## 2021-01-11 PROCEDURE — 36415 COLL VENOUS BLD VENIPUNCTURE: CPT

## 2021-01-11 PROCEDURE — U0005 INFEC AGEN DETEC AMPLI PROBE: HCPCS | Performed by: INTERNAL MEDICINE

## 2021-01-11 PROCEDURE — 80053 COMPREHEN METABOLIC PANEL: CPT

## 2021-01-11 PROCEDURE — U0003 INFECTIOUS AGENT DETECTION BY NUCLEIC ACID (DNA OR RNA); SEVERE ACUTE RESPIRATORY SYNDROME CORONAVIRUS 2 (SARS-COV-2) (CORONAVIRUS DISEASE [COVID-19]), AMPLIFIED PROBE TECHNIQUE, MAKING USE OF HIGH THROUGHPUT TECHNOLOGIES AS DESCRIBED BY CMS-2020-01-R: HCPCS | Performed by: INTERNAL MEDICINE

## 2021-01-11 PROCEDURE — 83735 ASSAY OF MAGNESIUM: CPT

## 2021-01-11 PROCEDURE — 85025 COMPLETE CBC W/AUTO DIFF WBC: CPT

## 2021-01-12 ENCOUNTER — TELEPHONE (OUTPATIENT)
Dept: INTERNAL MEDICINE CLINIC | Facility: CLINIC | Age: 61
End: 2021-01-12

## 2021-01-12 ENCOUNTER — APPOINTMENT (OUTPATIENT)
Dept: LAB | Facility: CLINIC | Age: 61
End: 2021-01-12
Payer: MEDICARE

## 2021-01-12 ENCOUNTER — TELEMEDICINE (OUTPATIENT)
Dept: INTERNAL MEDICINE CLINIC | Facility: CLINIC | Age: 61
End: 2021-01-12
Payer: MEDICARE

## 2021-01-12 DIAGNOSIS — R14.0 ABDOMINAL DISTENTION: ICD-10-CM

## 2021-01-12 DIAGNOSIS — I95.0 IDIOPATHIC HYPOTENSION: ICD-10-CM

## 2021-01-12 DIAGNOSIS — C22.1 MALIGNANT NEOPLASM OF INTRAHEPATIC BILE DUCTS (HCC): ICD-10-CM

## 2021-01-12 DIAGNOSIS — R14.0 ABDOMINAL DISTENSION (GASEOUS): ICD-10-CM

## 2021-01-12 DIAGNOSIS — C22.1 CHOLANGIOCARCINOMA (HCC): ICD-10-CM

## 2021-01-12 DIAGNOSIS — D62 ACUTE BLOOD LOSS ANEMIA: ICD-10-CM

## 2021-01-12 DIAGNOSIS — D62 ACUTE POSTHEMORRHAGIC ANEMIA: ICD-10-CM

## 2021-01-12 DIAGNOSIS — R74.8 BLOOD ALKALINE PHOSPHATASE INCREASED COMPARED WITH PRIOR MEASUREMENT: ICD-10-CM

## 2021-01-12 DIAGNOSIS — D62 ACUTE BLOOD LOSS ANEMIA: Primary | ICD-10-CM

## 2021-01-12 DIAGNOSIS — E87.1 HYPONATREMIA: ICD-10-CM

## 2021-01-12 LAB
ALBUMIN SERPL BCP-MCNC: 2.1 G/DL (ref 3.5–5)
ALP SERPL-CCNC: 370 U/L (ref 46–116)
ALT SERPL W P-5'-P-CCNC: 12 U/L (ref 12–78)
ANION GAP SERPL CALCULATED.3IONS-SCNC: 7 MMOL/L (ref 4–13)
AST SERPL W P-5'-P-CCNC: 28 U/L (ref 5–45)
BASOPHILS # BLD AUTO: 0.03 THOUSANDS/ΜL (ref 0–0.1)
BASOPHILS NFR BLD AUTO: 0 % (ref 0–1)
BILIRUB SERPL-MCNC: 0.39 MG/DL (ref 0.2–1)
BUN SERPL-MCNC: 21 MG/DL (ref 5–25)
CALCIUM ALBUM COR SERPL-MCNC: 10.5 MG/DL (ref 8.3–10.1)
CALCIUM SERPL-MCNC: 9 MG/DL (ref 8.3–10.1)
CHLORIDE SERPL-SCNC: 97 MMOL/L (ref 100–108)
CO2 SERPL-SCNC: 24 MMOL/L (ref 21–32)
CREAT SERPL-MCNC: 1.23 MG/DL (ref 0.6–1.3)
EOSINOPHIL # BLD AUTO: 0.03 THOUSAND/ΜL (ref 0–0.61)
EOSINOPHIL NFR BLD AUTO: 0 % (ref 0–6)
ERYTHROCYTE [DISTWIDTH] IN BLOOD BY AUTOMATED COUNT: 18.5 % (ref 11.6–15.1)
GFR SERPL CREATININE-BSD FRML MDRD: 63 ML/MIN/1.73SQ M
GLUCOSE SERPL-MCNC: 100 MG/DL (ref 65–140)
HCT VFR BLD AUTO: 24.1 % (ref 36.5–49.3)
HGB BLD-MCNC: 7.4 G/DL (ref 12–17)
IMM GRANULOCYTES # BLD AUTO: 0.05 THOUSAND/UL (ref 0–0.2)
IMM GRANULOCYTES NFR BLD AUTO: 0 % (ref 0–2)
INR PPP: 2.49 (ref 0.84–1.19)
LACTATE SERPL-SCNC: 2.3 MMOL/L (ref 0.5–2)
LYMPHOCYTES # BLD AUTO: 0.53 THOUSANDS/ΜL (ref 0.6–4.47)
LYMPHOCYTES NFR BLD AUTO: 5 % (ref 14–44)
MCH RBC QN AUTO: 27.9 PG (ref 26.8–34.3)
MCHC RBC AUTO-ENTMCNC: 30.7 G/DL (ref 31.4–37.4)
MCV RBC AUTO: 91 FL (ref 82–98)
MONOCYTES # BLD AUTO: 0.89 THOUSAND/ΜL (ref 0.17–1.22)
MONOCYTES NFR BLD AUTO: 8 % (ref 4–12)
NEUTROPHILS # BLD AUTO: 9.85 THOUSANDS/ΜL (ref 1.85–7.62)
NEUTS SEG NFR BLD AUTO: 87 % (ref 43–75)
NRBC BLD AUTO-RTO: 0 /100 WBCS
PLATELET # BLD AUTO: 399 THOUSANDS/UL (ref 149–390)
PMV BLD AUTO: 9.5 FL (ref 8.9–12.7)
POTASSIUM SERPL-SCNC: 4.2 MMOL/L (ref 3.5–5.3)
PROT SERPL-MCNC: 6.2 G/DL (ref 6.4–8.2)
PROTHROMBIN TIME: 27 SECONDS (ref 11.6–14.5)
RBC # BLD AUTO: 2.65 MILLION/UL (ref 3.88–5.62)
SARS-COV-2 RNA SPEC QL NAA+PROBE: NOT DETECTED
SODIUM SERPL-SCNC: 128 MMOL/L (ref 136–145)
WBC # BLD AUTO: 11.38 THOUSAND/UL (ref 4.31–10.16)

## 2021-01-12 PROCEDURE — 36415 COLL VENOUS BLD VENIPUNCTURE: CPT

## 2021-01-12 PROCEDURE — 99442 PR PHYS/QHP TELEPHONE EVALUATION 11-20 MIN: CPT | Performed by: INTERNAL MEDICINE

## 2021-01-12 PROCEDURE — 80053 COMPREHEN METABOLIC PANEL: CPT

## 2021-01-12 PROCEDURE — 85025 COMPLETE CBC W/AUTO DIFF WBC: CPT

## 2021-01-12 PROCEDURE — 83605 ASSAY OF LACTIC ACID: CPT

## 2021-01-12 PROCEDURE — 85610 PROTHROMBIN TIME: CPT

## 2021-01-12 NOTE — TELEPHONE ENCOUNTER
I had spoken with the patient's daughter at length on the phone regarding labs done earlier today  I think and I suspected he was having GI bleeding with decrease in his Hgb to 7 5, increased lactate to 3 2 as well as hyponatremia  He did not have any increase in urine output with lasix which I think is because is somewhat "clamped down" regarding the decreased hgb in the setting of biliary tract cancer and abnormal liver hemodynamics  I related the above to the patient's daughter who is an ICU nurse and I spoke about strong consideration about him being admitted to the hospital for further evaluation  Given the [place] pandemic she wishes to hold off going to the hospital unless absolutely necessary  She wants to see if it is possible to try to do some things as outpatient  He has had tarry stools but this seems to have subsided  Recheck labs tomorrow and will order CT scan as well of the abdomen non contrast to eval for possible retroperitoneal bleeding/? Abdominal trauma/hematoma in the setting of a recent fall on Eliquis  He has a history of provoked PE in the setting of two different malignancies  At this point the daughter and patient wish to pursue full care  Fir 9 am televisit tomorrow  Given continued fatigue, I also repeated covid test that is still pending  If that repeat comes back negative, will have patient visit me in the office

## 2021-01-12 NOTE — ASSESSMENT & PLAN NOTE
Likely due to acute blood loss anemia in setting of increased ADH in setting of cholangiocarcinoma and altered liver hemodynamics  Continue with salt tabs, repeating labs and will transfuse as needed

## 2021-01-12 NOTE — PROGRESS NOTES
Virtual Brief Visit    Assessment/Plan:    Problem List Items Addressed This Visit        Digestive    Cholangiocarcinoma [facility])    Relevant Orders    CBC and differential    Comprehensive metabolic panel    Lactic acid, plasma    CT abdomen pelvis wo contrast    Type and screen       Cardiovascular and Mediastinum    Hypotension     He has baseline hypotension with sbp in the 80s-90s on midodrine due to cholangiocarcinoma with altered liver hemodynamics  This has worsened  In the setting of acute blood loss anemia with hgb decrease from 9 to 7 5 with significant co-morbid illnesses  Plan is as under acute blood loss anemia  Holding diuretics  Relevant Orders    CBC and differential    Comprehensive metabolic panel    Lactic acid, plasma    CT abdomen pelvis wo contrast    Type and screen       Other    Hyponatremia     Likely due to acute blood loss anemia in setting of increased ADH in setting of cholangiocarcinoma and altered liver hemodynamics  Continue with salt tabs, repeating labs and will transfuse as needed  Acute blood loss anemia - Primary     This seems to have dissipated  There is no further blood in the stool  He is having mild diarrhea as per daughter  Trying to at daughter's request do all that we can do safely to keep the patient out of the hospital if at all possible  Will call infusion centers to see if opening if not today then tomorrow to transfuse  Ordering repeat cbc, cmp, lactic acid level and stat CT of the abdomen to see if abdominal distention is due to any type of retroperitoneal bleeding or hematoma/abdominal hematoma  I did say that if the lab work was significantly worse he may need to go into the hospital  Trying to hold off given increased risk of patient to get COVID  Repeat test was negative for this            Relevant Orders    CBC and differential    Comprehensive metabolic panel    Lactic acid, plasma    CT abdomen pelvis wo contrast    Type and screen Protime-INR    Blood alkaline phosphatase increased compared with prior measurement     It is maintained in the high 370s range  Continue to monitor  Other Visit Diagnoses     Abdominal distention        Relevant Orders    CBC and differential    Comprehensive metabolic panel    Lactic acid, plasma    CT abdomen pelvis wo contrast    Type and screen                Reason for visit is No chief complaint on file  Encounter provider Norm Hu DO    Provider located at [address] [facility] Adrián Mcadams [address] [facility]  [phone]    Recent Visits  Date Type Provider Dept   01/11/21 Telephone Norm Hu DO Medicine Physician   01/06/21 Telephone Norm Hu DO [facility] Internal [facility]   01/05/21 Al Ang,  Pg Internal Med aMye Belcher recent visits within past 7 days and meeting all other requirements     Today's Visits  Date Type Provider Dept   01/12/21 Al Ang,  Pg Internal [facility]   Showing today's visits and meeting all other requirements     Future Appointments  No visits were found meeting these conditions  Showing future appointments within next 150 days and meeting all other requirements        After connecting through telephone, the patient was identified by name and date of birth  Drake Alberto  was informed that this is a telemedicine visit and that the visit is being conducted through telephone  My office door was closed  No one else was in the room  He acknowledged consent and understanding of privacy and security of the platform  The patient has agreed to participate and understands he can discontinue the visit at any time  Patient is aware this is a billable service  Subjective    Drkae Alberto  is a 61 y o  male     HPI     I had called the patient last night and spoke with is daughter given abnormal labs   He had fallen several days ago and had a hairline fracture in his foot for which he had seen orthopedics  He has been having more dizziness with ambulation  Last night he had dark tarry stools the day prior this seems to have stabilized  The patient was reporting more weakness  In speaking with his daughter she is trying to keep the patient out of the hospital as much as possible given co-morbidities and increased COVID risk  The patient is on Xarelto cannot come off of it given history of non-provoked PE in the setting of two malignancies  He is not having tarry stools this am, he is having some diarrhea       Past Medical History:   Diagnosis Date    [facility] ([facility] [address] )     [facility]    SHELLIE (obstructive sleep apnea)     Peptic ulcer     Zollinger-Waddell syndrome        Past Surgical History:   Procedure Laterality Date    APPENDECTOMY      CHOLECYSTECTOMY      COLONOSCOPY      LIVER RESECTION      STOMACH SURGERY      UPPER GASTROINTESTINAL ENDOSCOPY         Current Outpatient Medications   Medication Sig Dispense Refill    acamprosate (CAMPRAL) 333 mg tablet Take 666 mg by mouth 3 (three) times a day      albuterol (2 5 mg/3 mL) 0 083 % nebulizer solution Take 1 vial (2 5 mg total) by nebulization every 6 (six) hours as needed for wheezing or shortness of breath 90 vial 0    buPROPion (WELLBUTRIN SR) 150 mg 12 hr tablet Take 1 tablet (150 mg total) by mouth 3 (three) times a day 2 TABS IN AM ONE IN PM 90 tablet 3    clonazePAM (KlonoPIN) 0 5 mg tablet Take 1/2 tablet (0 25 mg) BID PRN for anxiety and 1 tablet (0 5 mg) QHS 60 tablet 0    fentaNYL (DURAGESIC) 100 mcg/hr TD 72 hr patch Place 1 patch on the skin every third dayMax Daily Amount: 1 patch 10 patch 0    fentaNYL (DURAGESIC) 50 mcg/hr Place 1 patch on the skin every third dayMax Daily Amount: 1 patch 10 patch 0    furosemide (LASIX) 40 mg tablet Take 1 tablet (40 mg total) by mouth daily (Patient taking differently: Take 40 mg by mouth as needed ) 30 tablet 2  gabapentin (NEURONTIN) 300 mg capsule Take 300 mg by mouth 2 (two) times a day      haloperidol (HALDOL) 0 5 mg tablet TAKE 1 TABLET BY MOUTH EVERY 6 HOURS AS NEEDED FOR NAUSEA AND VOMITTING 90 tablet 0    hyoscyamine (LEVSIN) 0 125 MG/5ML ELIX Take 125 mcg by mouth every 4 (four) hours as needed for bladder spasms      levocetirizine (XYZAL) 5 MG tablet Take 10 mg by mouth daily as needed       midodrine (PROAMATINE) 10 MG tablet TAKE 1 TABLET (10 MG TOTAL) BY MOUTH 3 (THREE) TIMES A DAY BEFORE MEALS 90 tablet 0    milk thistle 175 MG tablet Take 175 mg by mouth 2 (two) times a day      montelukast (SINGULAIR) 10 mg tablet Take 10 mg by mouth daily at bedtime      naloxone (NARCAN) 4 mg/0 1 mL nasal spray Administer 1 spray into a nostril  If no response after 2-3 minutes, give another dose in the other nostril using a new spray  1 each 1    oxyCODONE (ROXICODONE) 20 MG TABS Take 1 tablet (20 mg total) by mouth every 4 (four) hours as needed for moderate pain (Cancer-related pain)Max Daily Amount: 120 mg 180 tablet 0    pantoprazole (PROTONIX) 40 mg tablet Take 80 mg by mouth 2 (two) times a day       potassium chloride (K-DUR,KLOR-CON) 10 mEq tablet Take 1 tablet (10 mEq total) by mouth daily (Patient taking differently: Take 10 mEq by mouth as needed ) 30 tablet 5    rivaroxaban (XARELTO) 10 mg tablet Take 1 tablet (10 mg total) by mouth daily 90 tablet 0    sodium chloride 1 g tablet Take 0 5 tablets (0 5 g total) by mouth 2 (two) times a day Please crush in applesauce when giving hold tablets if weight more than 143 pounds 60 tablet 2    traZODone (DESYREL) 150 mg tablet Take 1 tablet (150 mg total) by mouth daily at bedtime 90 tablet 0     No current facility-administered medications for this visit  Allergies   Allergen Reactions    Amoxicillin-Pot Clavulanate GI Intolerance    Dilaudid [Hydromorphone Hcl] Other (See Comments)     Patient develops agitation and confusion      Hydromorphone      Severe hallucinations    Nsaids GI Intolerance       Review of Systems positive fatigue no nausea increased abdominal distention no leg edema  Positive dark tarry stool positive dizziness and lightheadedness, positive recent fall, positive exertional dyspnea  There were no vitals filed for this visit  I spent 15 minutes directly with the patient during this visit    [address]  acknowledges that he has consented to an online visit or consultation  He understands that the online visit is based solely on information provided by him, and that, in the absence of a face-to-face physical evaluation by the physician, the diagnosis he receives is both limited and provisional in terms of accuracy and completeness  This is not intended to replace a full medical face-to-face evaluation by the physician  Tiffanie Kimble  understands and accepts these terms

## 2021-01-12 NOTE — PATIENT INSTRUCTIONS
1  Thank you for talking with me this am     2  We will set up tentative appoint next week  3  Will follow-up on labs and CT scan today

## 2021-01-12 NOTE — ASSESSMENT & PLAN NOTE
He has baseline hypotension with sbp in the 80s-90s on midodrine due to cholangiocarcinoma with altered liver hemodynamics  This has worsened  In the setting of acute blood loss anemia with hgb decrease from 9 to 7 5 with significant co-morbid illnesses  Plan is as under acute blood loss anemia  Holding diuretics

## 2021-01-12 NOTE — ASSESSMENT & PLAN NOTE
This seems to have dissipated  There is no further blood in the stool  He is having mild diarrhea as per daughter  Trying to at daughter's request do all that we can do safely to keep the patient out of the hospital if at all possible  Will call infusion centers to see if opening if not today then tomorrow to transfuse  Ordering repeat cbc, cmp, lactic acid level and stat CT of the abdomen to see if abdominal distention is due to any type of retroperitoneal bleeding or hematoma/abdominal hematoma  I did say that if the lab work was significantly worse he may need to go into the hospital  Trying to hold off given increased risk of patient to get COVID  Repeat test was negative for this

## 2021-01-13 ENCOUNTER — TELEPHONE (OUTPATIENT)
Dept: OTHER | Facility: HOSPITAL | Age: 61
End: 2021-01-13

## 2021-01-13 ENCOUNTER — HOSPITAL ENCOUNTER (OUTPATIENT)
Dept: RADIOLOGY | Facility: HOSPITAL | Age: 61
Discharge: HOME | End: 2021-01-13
Attending: INTERNAL MEDICINE
Payer: MEDICARE

## 2021-01-13 ENCOUNTER — HOSPITAL ENCOUNTER (OUTPATIENT)
Dept: INFUSION CENTER | Facility: HOSPITAL | Age: 61
Discharge: HOME | End: 2021-01-13
Payer: MEDICARE

## 2021-01-13 ENCOUNTER — APPOINTMENT (OUTPATIENT)
Dept: LAB | Facility: HOSPITAL | Age: 61
End: 2021-01-13
Payer: MEDICARE

## 2021-01-13 ENCOUNTER — TELEMEDICINE (OUTPATIENT)
Dept: INTERNAL MEDICINE CLINIC | Facility: CLINIC | Age: 61
End: 2021-01-13
Payer: MEDICARE

## 2021-01-13 VITALS
TEMPERATURE: 97.9 F | HEART RATE: 68 BPM | RESPIRATION RATE: 16 BRPM | SYSTOLIC BLOOD PRESSURE: 105 MMHG | DIASTOLIC BLOOD PRESSURE: 57 MMHG

## 2021-01-13 DIAGNOSIS — C22.1 CHOLANGIOCARCINOMA (HCC): ICD-10-CM

## 2021-01-13 DIAGNOSIS — E87.1 HYPONATREMIA: ICD-10-CM

## 2021-01-13 DIAGNOSIS — R18.8 OTHER ASCITES: ICD-10-CM

## 2021-01-13 DIAGNOSIS — I95.0 IDIOPATHIC HYPOTENSION: ICD-10-CM

## 2021-01-13 DIAGNOSIS — D62 ACUTE BLOOD LOSS ANEMIA: ICD-10-CM

## 2021-01-13 DIAGNOSIS — D62 ACUTE BLOOD LOSS ANEMIA: Primary | ICD-10-CM

## 2021-01-13 DIAGNOSIS — R14.0 ABDOMINAL DISTENTION: ICD-10-CM

## 2021-01-13 LAB
ABO GROUP BLD: NORMAL
BLD GP AB SCN SERPL QL: NEGATIVE
OSMOLALITY UR: 213 MMOL/KG
RH BLD: POSITIVE
SODIUM 24H UR-SCNC: 6 MOL/L
SPECIMEN EXPIRATION DATE: NORMAL

## 2021-01-13 PROCEDURE — 96374 THER/PROPH/DIAG INJ IV PUSH: CPT

## 2021-01-13 PROCEDURE — 83935 ASSAY OF URINE OSMOLALITY: CPT | Performed by: INTERNAL MEDICINE

## 2021-01-13 PROCEDURE — 74176 CT ABD & PELVIS W/O CONTRAST: CPT

## 2021-01-13 PROCEDURE — 86850 RBC ANTIBODY SCREEN: CPT | Performed by: INTERNAL MEDICINE

## 2021-01-13 PROCEDURE — G1004 CDSM NDSC: HCPCS

## 2021-01-13 PROCEDURE — 36430 TRANSFUSION BLD/BLD COMPNT: CPT

## 2021-01-13 PROCEDURE — 86901 BLOOD TYPING SEROLOGIC RH(D): CPT | Performed by: INTERNAL MEDICINE

## 2021-01-13 PROCEDURE — 84300 ASSAY OF URINE SODIUM: CPT | Performed by: INTERNAL MEDICINE

## 2021-01-13 PROCEDURE — 99442 PR PHYS/QHP TELEPHONE EVALUATION 11-20 MIN: CPT | Performed by: INTERNAL MEDICINE

## 2021-01-13 PROCEDURE — 86900 BLOOD TYPING SEROLOGIC ABO: CPT | Performed by: INTERNAL MEDICINE

## 2021-01-13 PROCEDURE — 86920 COMPATIBILITY TEST SPIN: CPT

## 2021-01-13 PROCEDURE — P9016 RBC LEUKOCYTES REDUCED: HCPCS

## 2021-01-13 RX ORDER — SODIUM CHLORIDE 9 MG/ML
20 INJECTION, SOLUTION INTRAVENOUS ONCE
Status: COMPLETED | OUTPATIENT
Start: 2021-01-13 | End: 2021-01-13

## 2021-01-13 RX ORDER — SODIUM CHLORIDE 9 MG/ML
20 INJECTION, SOLUTION INTRAVENOUS ONCE
Status: CANCELLED | OUTPATIENT
Start: 2021-01-13

## 2021-01-13 RX ORDER — FUROSEMIDE 10 MG/ML
20 INJECTION INTRAMUSCULAR; INTRAVENOUS ONCE
Status: CANCELLED | OUTPATIENT
Start: 2021-01-13

## 2021-01-13 RX ORDER — FUROSEMIDE 10 MG/ML
20 INJECTION INTRAMUSCULAR; INTRAVENOUS ONCE
Status: COMPLETED | OUTPATIENT
Start: 2021-01-13 | End: 2021-01-13

## 2021-01-13 RX ADMIN — FUROSEMIDE 20 MG: 10 INJECTION, SOLUTION INTRAMUSCULAR; INTRAVENOUS at 13:39

## 2021-01-13 RX ADMIN — SODIUM CHLORIDE 20 ML/HR: 0.9 INJECTION, SOLUTION INTRAVENOUS at 11:22

## 2021-01-13 NOTE — TELEPHONE ENCOUNTER
I spoke with the patient's daughter this am  Hgb 7 4 yesterday  I obtained consent for blood transfusion over the phone  I had placed orders for blood transfusion of 2 units of PRBCs at [facility]

## 2021-01-13 NOTE — PLAN OF CARE
Problem: Potential for Falls  Goal: Patient will remain free of falls  Description: INTERVENTIONS:  - Assess patient frequently for physical needs  -  Identify cognitive and physical deficits and behaviors that affect risk of falls    -  [place] fall precautions as indicated by assessment   - Educate patient/family on patient safety including physical limitations  - Instruct patient to call for assistance with activity based on assessment  - Modify environment to reduce risk of injury  - Consider OT/PT consult to assist with strengthening/mobility  Outcome: Progressing

## 2021-01-14 ENCOUNTER — TRANSCRIBE ORDERS (OUTPATIENT)
Dept: INTERVENTIONAL RADIOLOGY/VASCULAR | Facility: HOSPITAL | Age: 61
End: 2021-01-14

## 2021-01-14 ENCOUNTER — TELEPHONE (OUTPATIENT)
Dept: GASTROENTEROLOGY | Facility: AMBULARY SURGERY CENTER | Age: 61
End: 2021-01-14

## 2021-01-14 DIAGNOSIS — R18.8 OTHER ASCITES: Primary | ICD-10-CM

## 2021-01-14 LAB
ABO GROUP BLD BPU: NORMAL
ABO GROUP BLD BPU: NORMAL
BPU ID: NORMAL
BPU ID: NORMAL
CROSSMATCH: NORMAL
CROSSMATCH: NORMAL
UNIT DISPENSE STATUS: NORMAL
UNIT DISPENSE STATUS: NORMAL
UNIT PRODUCT CODE: NORMAL
UNIT PRODUCT CODE: NORMAL
UNIT RH: NORMAL
UNIT RH: NORMAL

## 2021-01-14 NOTE — ASSESSMENT & PLAN NOTE
The patient is s/p 2 units of packed red cells today at the outpatient infusion center  The patient is feeling better less dizzy  For right now continue to monitor Hgb levels  CT scan done did not show any evidence of any retroperitoneal bleed or hematoma  I reviewed the results of the CT scan with the patient's daughter

## 2021-01-14 NOTE — H&P (VIEW-ONLY)
Virtual Brief Visit    Assessment/Plan:    Problem List Items Addressed This Visit        Other    Hyponatremia     I think this is due to decreased EABV in the setting of acute blood loss anemia, with altered splanchnic hemodynamics, and sodium avidity in the setting of cholangiocarcinoma and gastric adenocarcinoma  Patient was given Lasix 20 mg IV post blood transfusion today  Check labs next week         Acute blood loss anemia - Primary     The patient is s/p 2 units of packed red cells today at the outpatient infusion center  The patient is feeling better less dizzy  For right now continue to monitor Hgb levels  CT scan done did not show any evidence of any retroperitoneal bleed or hematoma  I reviewed the results of the CT scan with the patient's daughter  Other ascites     The patient's abdominal distention is due to ascites as per CT scan of the abdomen and pelvis without contrast  I am not sure if this is malignant ascites or if it is due to cholangiocarcinoma affecting increased portal venous pressures, ie ascites with high-albumin gradient  Will consult IR for ascites and check ascites total protein, albumin, cell count and gram stain to evaluate SAAG  Recheck labs next week                     Reason for visit is No chief complaint on file         Encounter provider Cheng Dawn DO    Provider located at [address] [place] [address]  [phone]    Recent Visits  Date Type Provider Dept   01/12/21 Telephone Chuy Jarrell RN [facility]   01/12/21 Al Ang DO [facility]   01/11/21 Telephone Cheng Dawn DO Medicine Physician   01/06/21 Telephone Cheng Dawn DO [facility] Mariela Lozano recent visits within past 7 days and meeting all other requirements     Today's Visits  Date Type Provider Dept   01/13/21 Al Ang DO [facility]   01/13/21 Telephone Jeanie Orozco DO Medicine Physician   Showing today's visits and meeting all other requirements     Future Appointments  No visits were found meeting these conditions  Showing future appointments within next 150 days and meeting all other requirements        After connecting through telephone, the patient was identified by name and date of birth  Gianfranco Gabriel  was informed that this is a telemedicine visit and that the visit is being conducted through telephone and patient was informed that this is a secure, HIPAA-compliant platform  He agrees to proceed     My office door was closed  No one else was in the room  He acknowledged consent and understanding of privacy and security of the platform  The patient has agreed to participate and understands he can discontinue the visit at any time  Patient is aware this is a billable service  Subjective    Gianfranco Gabriel  is a 61 y o  male     HPI     I had done a follow-up phone visit regarding the events of the day  The patient was transfused 2 units PRBCs this am and also went stat CT scan of the abdomen which showed abdominal and pelvic ascites  He was also given Lasix IV post transfusion  He feels a little better although he has been having SOB due to abdominal distention due to worsening ascites       Past Medical History:   Diagnosis Date    [facility]     Gastric carcinoma ([facility] ) 06/2020    SHELLIE (obstructive sleep apnea)     Peptic ulcer     Zollinger-Waddell syndrome        Past Surgical History:   Procedure Laterality Date    APPENDECTOMY      CHOLECYSTECTOMY      COLONOSCOPY      LIVER RESECTION      STOMACH SURGERY      UPPER GASTROINTESTINAL ENDOSCOPY         Current Outpatient Medications   Medication Sig Dispense Refill    acamprosate (CAMPRAL) 333 mg tablet Take 666 mg by mouth 3 (three) times a day      albuterol (2 5 mg/3 mL) 0 083 % nebulizer solution Take 1 vial (2 5 mg total) by nebulization every 6 (six) hours as needed for wheezing or shortness of breath 90 vial 0    buPROPion (WELLBUTRIN SR) 150 mg 12 hr tablet Take 1 tablet (150 mg total) by mouth 3 (three) times a day 2 TABS IN AM ONE IN PM 90 tablet 3    clonazePAM (KlonoPIN) 0 5 mg tablet Take 1/2 tablet (0 25 mg) BID PRN for anxiety and 1 tablet (0 5 mg) QHS 60 tablet 0    fentaNYL (DURAGESIC) 100 mcg/hr TD 72 hr patch Place 1 patch on the skin every third dayMax Daily Amount: 1 patch 10 patch 0    fentaNYL (DURAGESIC) 50 mcg/hr Place 1 patch on the skin every third dayMax Daily Amount: 1 patch 10 patch 0    furosemide (LASIX) 40 mg tablet Take 1 tablet (40 mg total) by mouth daily (Patient taking differently: Take 40 mg by mouth as needed ) 30 tablet 2    gabapentin (NEURONTIN) 300 mg capsule Take 300 mg by mouth 2 (two) times a day      haloperidol (HALDOL) 0 5 mg tablet TAKE 1 TABLET BY MOUTH EVERY 6 HOURS AS NEEDED FOR NAUSEA AND VOMITTING 90 tablet 0    hyoscyamine (LEVSIN) 0 125 MG/5ML ELIX Take 125 mcg by mouth every 4 (four) hours as needed for bladder spasms      levocetirizine (XYZAL) 5 MG tablet Take 10 mg by mouth daily as needed       midodrine (PROAMATINE) 10 MG tablet TAKE 1 TABLET (10 MG TOTAL) BY MOUTH 3 (THREE) TIMES A DAY BEFORE MEALS 90 tablet 0    milk thistle 175 MG tablet Take 175 mg by mouth 2 (two) times a day      montelukast (SINGULAIR) 10 mg tablet Take 10 mg by mouth daily at bedtime      naloxone (NARCAN) 4 mg/0 1 mL nasal spray Administer 1 spray into a nostril  If no response after 2-3 minutes, give another dose in the other nostril using a new spray   1 each 1    oxyCODONE (ROXICODONE) 20 MG TABS Take 1 tablet (20 mg total) by mouth every 4 (four) hours as needed for moderate pain (Cancer-related pain)Max Daily Amount: 120 mg 180 tablet 0    pantoprazole (PROTONIX) 40 mg tablet Take 80 mg by mouth 2 (two) times a day       potassium chloride (K-DUR,KLOR-CON) 10 mEq tablet Take 1 tablet (10 mEq total) by mouth daily (Patient taking differently: Take 10 mEq by mouth as needed ) 30 tablet 5    rivaroxaban (XARELTO) 10 mg tablet Take 1 tablet (10 mg total) by mouth daily 90 tablet 0    sodium chloride 1 g tablet Take 0 5 tablets (0 5 g total) by mouth 2 (two) times a day Please crush in applesauce when giving hold tablets if weight more than 143 pounds 60 tablet 2    traZODone (DESYREL) 150 mg tablet Take 1 tablet (150 mg total) by mouth daily at bedtime 90 tablet 0     No current facility-administered medications for this visit  Allergies   Allergen Reactions    Amoxicillin-Pot Clavulanate GI Intolerance    Dilaudid [Hydromorphone Hcl] Other (See Comments)     Patient develops agitation and confusion   Hydromorphone      Severe hallucinations    Nsaids GI Intolerance       Review of Systems no chest pain, sob with exertion, positive lightheadedness and dizziness positive abdominal distention, positive weakness and fatigue  There were no vitals filed for this visit  I spent 20 minutes directly with the patient during this visit    [address]  acknowledges that he has consented to an online visit or consultation  He understands that the online visit is based solely on information provided by him, and that, in the absence of a face-to-face physical evaluation by the physician, the diagnosis he receives is both limited and provisional in terms of accuracy and completeness  This is not intended to replace a full medical face-to-face evaluation by the physician  Meliza Truong  understands and accepts these terms

## 2021-01-14 NOTE — ASSESSMENT & PLAN NOTE
The patient's abdominal distention is due to ascites as per CT scan of the abdomen and pelvis without contrast  I am not sure if this is malignant ascites or if it is due to cholangiocarcinoma affecting increased portal venous pressures, ie ascites with high-albumin gradient  Will consult IR for ascites and check ascites total protein, albumin, cell count and gram stain to evaluate SAAG   Recheck labs next week

## 2021-01-14 NOTE — TELEPHONE ENCOUNTER
Patients GI provider:  Dr Shayy Tomlin    Number to return call: ( [phone]    Reason for call: Pt has ascites, hx blood in stool---please assist for sooner appt either virtual or in person, recarlos geme only    Scheduled procedure/appointment date if applicable: Apt/procedure  na

## 2021-01-14 NOTE — ASSESSMENT & PLAN NOTE
I think this is due to decreased EABV in the setting of acute blood loss anemia, with altered splanchnic hemodynamics, and sodium avidity in the setting of cholangiocarcinoma and gastric adenocarcinoma  Patient was given Lasix 20 mg IV post blood transfusion today   Check labs next week

## 2021-01-14 NOTE — PROGRESS NOTES
Virtual Brief Visit    Assessment/Plan:    Problem List Items Addressed This Visit        Other    Hyponatremia     I think this is due to decreased EABV in the setting of acute blood loss anemia, with altered splanchnic hemodynamics, and sodium avidity in the setting of cholangiocarcinoma and gastric adenocarcinoma  Patient was given Lasix 20 mg IV post blood transfusion today  Check labs next week         Acute blood loss anemia - Primary     The patient is s/p 2 units of packed red cells today at the outpatient infusion center  The patient is feeling better less dizzy  For right now continue to monitor Hgb levels  CT scan done did not show any evidence of any retroperitoneal bleed or hematoma  I reviewed the results of the CT scan with the patient's daughter  Other ascites     The patient's abdominal distention is due to ascites as per CT scan of the abdomen and pelvis without contrast  I am not sure if this is malignant ascites or if it is due to cholangiocarcinoma affecting increased portal venous pressures, ie ascites with high-albumin gradient  Will consult IR for ascites and check ascites total protein, albumin, cell count and gram stain to evaluate SAAG  Recheck labs next week                     Reason for visit is No chief complaint on file         Encounter provider Yanelis George DO    Provider located at [address] [place] [address]    Recent Visits  Date Type Provider Dept   01/12/21 Telephone Melva Watkins RN [facility]   01/12/21 Al Ang DO [facility]   01/11/21 Telephone Yanelis George DO Medicine Physician   01/06/21 Telephone Yanelis George DO [facility] Wilfrido Wells recent visits within past 7 days and meeting all other requirements     Today's Visits  Date Type Provider Dept   01/13/21 Al Ang DO [facility]   01/13/21 Telephone Sofía Sheth DO Medicine Physician   Showing today's visits and meeting all other requirements     Future Appointments  No visits were found meeting these conditions  Showing future appointments within next 150 days and meeting all other requirements        After connecting through telephone, the patient was identified by name and date of birth  Aleshia Banuelos  was informed that this is a telemedicine visit and that the visit is being conducted through telephone and patient was informed that this is a secure, HIPAA-compliant platform  He agrees to proceed     My office door was closed  No one else was in the room  He acknowledged consent and understanding of privacy and security of the platform  The patient has agreed to participate and understands he can discontinue the visit at any time  Patient is aware this is a billable service  Subjective    Aleshia Banuelos  is a 61 y o  male     HPI     I had done a follow-up phone visit regarding the events of the day  The patient was transfused 2 units PRBCs this am and also went stat CT scan of the abdomen which showed abdominal and pelvic ascites  He was also given Lasix IV post transfusion  He feels a little better although he has been having SOB due to abdominal distention due to worsening ascites       Past Medical History:   Diagnosis Date    [facility] [address] [facility]     [facility]    SHELLIE (obstructive sleep apnea)     Peptic ulcer     Zollinger-Waddell syndrome        Past Surgical History:   Procedure Laterality Date    APPENDECTOMY      CHOLECYSTECTOMY      COLONOSCOPY      LIVER RESECTION      STOMACH SURGERY      UPPER GASTROINTESTINAL ENDOSCOPY         Current Outpatient Medications   Medication Sig Dispense Refill    acamprosate (CAMPRAL) 333 mg tablet Take 666 mg by mouth 3 (three) times a day      albuterol (2 5 mg/3 mL) 0 083 % nebulizer solution Take 1 vial (2 5 mg total) by nebulization every 6 (six) hours as needed for wheezing or shortness of breath 90 vial 0    buPROPion (WELLBUTRIN SR) 150 mg 12 hr tablet Take 1 tablet (150 mg total) by mouth 3 (three) times a day 2 TABS IN AM ONE IN PM 90 tablet 3    clonazePAM (KlonoPIN) 0 5 mg tablet Take 1/2 tablet (0 25 mg) BID PRN for anxiety and 1 tablet (0 5 mg) QHS 60 tablet 0    fentaNYL (DURAGESIC) 100 mcg/hr TD 72 hr patch Place 1 patch on the skin every third dayMax Daily Amount: 1 patch 10 patch 0    fentaNYL (DURAGESIC) 50 mcg/hr Place 1 patch on the skin every third dayMax Daily Amount: 1 patch 10 patch 0    furosemide (LASIX) 40 mg tablet Take 1 tablet (40 mg total) by mouth daily (Patient taking differently: Take 40 mg by mouth as needed ) 30 tablet 2    gabapentin (NEURONTIN) 300 mg capsule Take 300 mg by mouth 2 (two) times a day      haloperidol (HALDOL) 0 5 mg tablet TAKE 1 TABLET BY MOUTH EVERY 6 HOURS AS NEEDED FOR NAUSEA AND VOMITTING 90 tablet 0    hyoscyamine (LEVSIN) 0 125 MG/5ML ELIX Take 125 mcg by mouth every 4 (four) hours as needed for bladder spasms      levocetirizine (XYZAL) 5 MG tablet Take 10 mg by mouth daily as needed       midodrine (PROAMATINE) 10 MG tablet TAKE 1 TABLET (10 MG TOTAL) BY MOUTH 3 (THREE) TIMES A DAY BEFORE MEALS 90 tablet 0    milk thistle 175 MG tablet Take 175 mg by mouth 2 (two) times a day      montelukast (SINGULAIR) 10 mg tablet Take 10 mg by mouth daily at bedtime      naloxone (NARCAN) 4 mg/0 1 mL nasal spray Administer 1 spray into a nostril  If no response after 2-3 minutes, give another dose in the other nostril using a new spray   1 each 1    oxyCODONE (ROXICODONE) 20 MG TABS Take 1 tablet (20 mg total) by mouth every 4 (four) hours as needed for moderate pain (Cancer-related pain)Max Daily Amount: 120 mg 180 tablet 0    pantoprazole (PROTONIX) 40 mg tablet Take 80 mg by mouth 2 (two) times a day       potassium chloride (K-DUR,KLOR-CON) 10 mEq tablet Take 1 tablet (10 mEq total) by mouth daily (Patient taking differently: Take 10 mEq by mouth as needed ) 30 tablet 5    rivaroxaban (XARELTO) 10 mg tablet Take 1 tablet (10 mg total) by mouth daily 90 tablet 0    sodium chloride 1 g tablet Take 0 5 tablets (0 5 g total) by mouth 2 (two) times a day Please crush in applesauce when giving hold tablets if weight more than 143 pounds 60 tablet 2    traZODone (DESYREL) 150 mg tablet Take 1 tablet (150 mg total) by mouth daily at bedtime 90 tablet 0     No current facility-administered medications for this visit  Allergies   Allergen Reactions    Amoxicillin-Pot Clavulanate GI Intolerance    Dilaudid [Hydromorphone Hcl] Other (See Comments)     Patient develops agitation and confusion   Hydromorphone      Severe hallucinations    Nsaids GI Intolerance       Review of Systems no chest pain, sob with exertion, positive lightheadedness and dizziness positive abdominal distention, positive weakness and fatigue  There were no vitals filed for this visit  I spent 20 minutes directly with the patient during this visit    [address]  acknowledges that he has consented to an online visit or consultation  He understands that the online visit is based solely on information provided by him, and that, in the absence of a face-to-face physical evaluation by the physician, the diagnosis he receives is both limited and provisional in terms of accuracy and completeness  This is not intended to replace a full medical face-to-face evaluation by the physician  Suzie Art  understands and accepts these terms

## 2021-01-15 ENCOUNTER — HOSPITAL ENCOUNTER (OUTPATIENT)
Dept: NON INVASIVE DIAGNOSTICS | Facility: HOSPITAL | Age: 61
Discharge: HOME | End: 2021-01-15
Attending: INTERNAL MEDICINE | Admitting: RADIOLOGY
Payer: MEDICARE

## 2021-01-15 ENCOUNTER — HOSPITAL ENCOUNTER (OUTPATIENT)
Dept: INFUSION CENTER | Facility: HOSPITAL | Age: 61
Discharge: HOME | End: 2021-01-15
Payer: MEDICARE

## 2021-01-15 VITALS
DIASTOLIC BLOOD PRESSURE: 67 MMHG | RESPIRATION RATE: 16 BRPM | SYSTOLIC BLOOD PRESSURE: 96 MMHG | OXYGEN SATURATION: 98 % | TEMPERATURE: 98.5 F | HEART RATE: 71 BPM

## 2021-01-15 VITALS
SYSTOLIC BLOOD PRESSURE: 101 MMHG | HEART RATE: 64 BPM | DIASTOLIC BLOOD PRESSURE: 65 MMHG | OXYGEN SATURATION: 96 % | RESPIRATION RATE: 16 BRPM

## 2021-01-15 DIAGNOSIS — R18.8 OTHER ASCITES: ICD-10-CM

## 2021-01-15 LAB
ALBUMIN FLD-MCNC: 0.7 G/DL
APPEARANCE FLD: ABNORMAL
COLOR FLD: YELLOW
HISTIOCYTES NFR FLD: 4 %
LDH FLD L TO P-CCNC: 65 U/L
LYMPHOCYTES NFR BLD AUTO: 25 %
MONO+MESO NFR FLD MANUAL: 4 %
MONOCYTES NFR BLD AUTO: 3 %
NEUTS SEG NFR BLD AUTO: 64 %
PROT FLD-MCNC: <2 G/DL
SITE: ABNORMAL
TOTAL CELLS COUNTED SPEC: 100
WBC # FLD MANUAL: 1277 /UL

## 2021-01-15 PROCEDURE — 84157 ASSAY OF PROTEIN OTHER: CPT

## 2021-01-15 PROCEDURE — 82042 OTHER SOURCE ALBUMIN QUAN EA: CPT

## 2021-01-15 PROCEDURE — 89051 BODY FLUID CELL COUNT: CPT

## 2021-01-15 PROCEDURE — 83615 LACTATE (LD) (LDH) ENZYME: CPT

## 2021-01-15 PROCEDURE — 49083 ABD PARACENTESIS W/IMAGING: CPT | Performed by: RADIOLOGY

## 2021-01-15 PROCEDURE — 87070 CULTURE OTHR SPECIMN AEROBIC: CPT

## 2021-01-15 PROCEDURE — 87205 SMEAR GRAM STAIN: CPT

## 2021-01-15 PROCEDURE — 49083 ABD PARACENTESIS W/IMAGING: CPT

## 2021-01-15 RX ORDER — ALBUMIN (HUMAN) 12.5 G/50ML
25 SOLUTION INTRAVENOUS ONCE
Status: COMPLETED | OUTPATIENT
Start: 2021-01-15 | End: 2021-01-15

## 2021-01-15 RX ORDER — LIDOCAINE WITH 8.4% SOD BICARB 0.9%(10ML)
SYRINGE (ML) INJECTION CODE/TRAUMA/SEDATION MEDICATION
Status: COMPLETED | OUTPATIENT
Start: 2021-01-15 | End: 2021-01-15

## 2021-01-15 RX ADMIN — ALBUMIN (HUMAN) 25 G: 0.25 INJECTION, SOLUTION INTRAVENOUS at 09:45

## 2021-01-15 RX ADMIN — ALBUMIN (HUMAN) 25 G: 0.25 INJECTION, SOLUTION INTRAVENOUS at 10:23

## 2021-01-15 RX ADMIN — Medication 6 ML: at 09:20

## 2021-01-15 NOTE — SEDATION DOCUMENTATION
Procedure ended, pt tolerated without incident  Vss, 7 liters cloudy travon fluid removed  Labs sent  Pt to go to infusion for albumin replacement

## 2021-01-15 NOTE — BRIEF OP NOTE (RAD/CATH)
INTERVENTIONAL RADIOLOGY PROCEDURE NOTE    Date: 1/15/2021    Procedure: IR PARACENTESIS  BODY FLUID WHITE CELL COUNT WITH DIFF  BODY FLUID CULTURE AND GRAM STAIN  ALBUMIN, FLUID  TOTAL PROTEIN,  FLUID  LD(LDH), BODY FLUID    Preoperative diagnosis:   1  Other ascites         Postoperative diagnosis: Same  Surgeon: Asael Palacio MD     Assistant: None  No qualified resident was available  Blood loss: Minimal    Specimens: Ascites specimen sent to lab     Findings: Ascites, paracentesis performed    Complications: None immediate      Anesthesia: local

## 2021-01-15 NOTE — PLAN OF CARE
Problem: Potential for Falls  Goal: Patient will remain free of falls  Description: INTERVENTIONS:  - Assess patient frequently for physical needs  -  Identify cognitive and physical deficits and behaviors that affect risk of falls  -  [place] fall precautions as indicated by assessment   - Educate patient/family on patient safety including physical limitations  - Instruct patient to call for assistance with activity based on assessment  - Modify environment to reduce risk of injury  - Consider OT/PT consult to assist with strengthening/mobility  Outcome: Progressing     Problem: DISCHARGE PLANNING  Goal: Discharge to home or other facility with appropriate resources  Description: INTERVENTIONS:  - Identify barriers to discharge w/patient and caregiver  - Arrange for needed discharge resources and transportation as appropriate  - Identify discharge learning needs (meds, wound care, etc )  - Arrange for interpretive services to assist at discharge as needed  - Refer to Case Management Department for coordinating discharge planning if the patient needs post-hospital services based on physician/advanced practitioner order or complex needs related to functional status, cognitive ability, or social support system  Outcome: Progressing     Problem: Knowledge Deficit  Goal: Patient/family/caregiver demonstrates understanding of disease process, treatment plan, medications, and discharge instructions  Description: Complete learning assessment and assess knowledge base    Interventions:  - Provide teaching at level of understanding  - Provide teaching via preferred learning methods  Outcome: Progressing

## 2021-01-15 NOTE — INTERVAL H&P NOTE
Update: (This section must be completed if the H&P was completed greater than 24 hrs to procedure or admission)    H&P reviewed  After examining the patient, I find no changed to the H&P since it had been written  Patient re-evaluated   Accept as history and physical     Marley Carlos MD/January 15, 2021/9:46 AM

## 2021-01-15 NOTE — DISCHARGE INSTRUCTIONS
Abdominal Paracentesis     Interventional Radiology Department Number: [phone]  WHAT YOU NEED TO KNOW:   Abdominal paracentesis is a procedure to remove abnormal fluid buildup in your abdomen  Fluid builds up because of liver problems, such as swelling and scarring  Heart failure, kidney disease, a mass, or problems with your pancreas may also cause fluid buildup  Before your procedure:   · Vital signs:  Caregivers will check your blood pressure, heart rate, breathing rate, and temperature  They will also ask about your pain  These vital signs give caregivers information about your current health  · Caregivers may insert an intravenous tube (IV) into your vein  A vein in the arm is usually chosen  Through the IV tube, you may be given liquids and medicine  · Pre-op care: You may be asked to urinate in order to empty your bladder  You are taken to the procedure room and moved to a table or bed  You will need to lie on your back or on your side  · Local anesthesia: This medicine makes you more comfortable during your procedure  Local anesthesia is a shot of medicine put into your skin  Local anesthesia is used to numb the procedure area and dull your pain  You may still feel pressure or pushing during the procedure after you get this medicine  During your procedure:   · Your healthcare provider taps on and feels your abdomen to decide where to insert the needle  Your healthcare provider may also use an ultrasound to help decide where to insert the needle  An ultrasound uses sound waves to show pictures of the inside of your abdomen on a TV-like screen  Your healthcare provider cleans your skin and covers the area around the procedure site with a clean sheet  A needle will be inserted into your abdominal cavity  A syringe will be attached to the needle to remove a small amount of ascites fluid   The needle will be removed once your healthcare provider has removed enough fluid for testing  · To remove a larger amount of fluid, a needle is inserted into your abdominal cavity  A catheter (small, thin tube) is attached to the needle and the needle is removed  The catheter tubing will be attached to a suction device (gentle vacuum) to help remove the fluid  The fluid will drain into a container attached to the tubing    When your healthcare provider has pulled enough fluid from your abdomen, he will remove the catheter  Your wound (procedure site) will be covered with a bandage  The ascites fluid may be sent to a lab for tests  After your procedure Do not get out of bed until your healthcare provider says it is okay  When healthcare providers see that you are not having any problems, you may be able to go home  If you are staying in the hospital, you may be taken back to your room  · Blood tests: You may need blood taken to give caregivers information about how your body is working  The blood may be taken from your hand, arm, or IV  · Intravenous fluids and volume expanders: You may need fluids or volume expanders given through your IV after your procedure  These fluids include albumin or saline  Albumin is a protein found in your blood  The IV fluids help prevent a drop in your blood pressure    · If you do not have an abdominal paracentesis, your symptoms may get worse  You may feel short of breath, have abdominal and chest pain, and have trouble moving around  You may not learn why fluid is building up in your abdomen  You may not get proper treatment  You may have bleeding inside your stomach or bowels  Your kidneys may stop working, and your liver problems may get worse  The fluid inside your abdomen may get infected and cause abdominal pain and tiredness  An infection may become life-threatening and you may die  Talk with your healthcare provider if you have questions or concerns about your procedure, condition, or care    · Bandage may be removed the next day and you may shower the next day    · If you have any fever or abdominal pain please notify your MD   · You may call the Radiology nurse at [[phone] until 4pm After 4pm you may call your ordering MD

## 2021-01-16 DIAGNOSIS — R11.0 NAUSEA: ICD-10-CM

## 2021-01-16 DIAGNOSIS — C22.1 CHOLANGIOCARCINOMA (HCC): ICD-10-CM

## 2021-01-16 DIAGNOSIS — K31.1 GASTRIC OUTLET OBSTRUCTION: ICD-10-CM

## 2021-01-17 DIAGNOSIS — E87.1 HYPONATREMIA: ICD-10-CM

## 2021-01-17 DIAGNOSIS — I95.0 IDIOPATHIC HYPOTENSION: Primary | ICD-10-CM

## 2021-01-17 DIAGNOSIS — R18.8 OTHER ASCITES: ICD-10-CM

## 2021-01-17 DIAGNOSIS — D62 ACUTE BLOOD LOSS ANEMIA: ICD-10-CM

## 2021-01-17 DIAGNOSIS — C22.1 CHOLANGIOCARCINOMA (HCC): ICD-10-CM

## 2021-01-18 ENCOUNTER — LAB (OUTPATIENT)
Dept: LAB | Facility: CLINIC | Age: 61
End: 2021-01-18
Payer: MEDICARE

## 2021-01-18 DIAGNOSIS — D62 ACUTE BLOOD LOSS ANEMIA: ICD-10-CM

## 2021-01-18 DIAGNOSIS — I95.0 IDIOPATHIC HYPOTENSION: ICD-10-CM

## 2021-01-18 DIAGNOSIS — R18.8 OTHER ASCITES: ICD-10-CM

## 2021-01-18 DIAGNOSIS — C22.1 CHOLANGIOCARCINOMA (HCC): ICD-10-CM

## 2021-01-18 DIAGNOSIS — E87.1 HYPONATREMIA: ICD-10-CM

## 2021-01-18 LAB
ALBUMIN SERPL BCP-MCNC: 2.4 G/DL (ref 3.5–5)
ALP SERPL-CCNC: 607 U/L (ref 46–116)
ALT SERPL W P-5'-P-CCNC: 28 U/L (ref 12–78)
ANION GAP SERPL CALCULATED.3IONS-SCNC: 4 MMOL/L (ref 4–13)
AST SERPL W P-5'-P-CCNC: 58 U/L (ref 5–45)
BACTERIA SPEC BFLD CULT: NO GROWTH
BASOPHILS # BLD AUTO: 0.01 THOUSANDS/ΜL (ref 0–0.1)
BASOPHILS NFR BLD AUTO: 0 % (ref 0–1)
BILIRUB SERPL-MCNC: 0.62 MG/DL (ref 0.2–1)
BUN SERPL-MCNC: 19 MG/DL (ref 5–25)
CALCIUM ALBUM COR SERPL-MCNC: 11.2 MG/DL (ref 8.3–10.1)
CALCIUM SERPL-MCNC: 9.9 MG/DL (ref 8.3–10.1)
CHLORIDE SERPL-SCNC: 97 MMOL/L (ref 100–108)
CO2 SERPL-SCNC: 28 MMOL/L (ref 21–32)
CREAT SERPL-MCNC: 0.97 MG/DL (ref 0.6–1.3)
EOSINOPHIL # BLD AUTO: 0.03 THOUSAND/ΜL (ref 0–0.61)
EOSINOPHIL NFR BLD AUTO: 0 % (ref 0–6)
ERYTHROCYTE [DISTWIDTH] IN BLOOD BY AUTOMATED COUNT: 17.6 % (ref 11.6–15.1)
GFR SERPL CREATININE-BSD FRML MDRD: 84 ML/MIN/1.73SQ M
GLUCOSE SERPL-MCNC: 99 MG/DL (ref 65–140)
GRAM STN SPEC: NORMAL
GRAM STN SPEC: NORMAL
HCT VFR BLD AUTO: 33.4 % (ref 36.5–49.3)
HGB BLD-MCNC: 10.2 G/DL (ref 12–17)
IMM GRANULOCYTES # BLD AUTO: 0.03 THOUSAND/UL (ref 0–0.2)
IMM GRANULOCYTES NFR BLD AUTO: 0 % (ref 0–2)
LYMPHOCYTES # BLD AUTO: 0.63 THOUSANDS/ΜL (ref 0.6–4.47)
LYMPHOCYTES NFR BLD AUTO: 7 % (ref 14–44)
MAGNESIUM SERPL-MCNC: 1.9 MG/DL (ref 1.6–2.6)
MCH RBC QN AUTO: 27.9 PG (ref 26.8–34.3)
MCHC RBC AUTO-ENTMCNC: 30.5 G/DL (ref 31.4–37.4)
MCV RBC AUTO: 92 FL (ref 82–98)
MONOCYTES # BLD AUTO: 0.86 THOUSAND/ΜL (ref 0.17–1.22)
MONOCYTES NFR BLD AUTO: 10 % (ref 4–12)
NEUTROPHILS # BLD AUTO: 7.17 THOUSANDS/ΜL (ref 1.85–7.62)
NEUTS SEG NFR BLD AUTO: 83 % (ref 43–75)
NRBC BLD AUTO-RTO: 0 /100 WBCS
PLATELET # BLD AUTO: 308 THOUSANDS/UL (ref 149–390)
PMV BLD AUTO: 9.8 FL (ref 8.9–12.7)
POTASSIUM SERPL-SCNC: 5 MMOL/L (ref 3.5–5.3)
PROT SERPL-MCNC: 6.1 G/DL (ref 6.4–8.2)
RBC # BLD AUTO: 3.65 MILLION/UL (ref 3.88–5.62)
SODIUM SERPL-SCNC: 129 MMOL/L (ref 136–145)
WBC # BLD AUTO: 8.73 THOUSAND/UL (ref 4.31–10.16)

## 2021-01-18 PROCEDURE — 85025 COMPLETE CBC W/AUTO DIFF WBC: CPT

## 2021-01-18 PROCEDURE — 83735 ASSAY OF MAGNESIUM: CPT

## 2021-01-18 PROCEDURE — 80053 COMPREHEN METABOLIC PANEL: CPT

## 2021-01-18 PROCEDURE — 36415 COLL VENOUS BLD VENIPUNCTURE: CPT

## 2021-01-18 RX ORDER — HALOPERIDOL 0.5 MG/1
TABLET ORAL
Qty: 90 TABLET | Refills: 0 | Status: SHIPPED | OUTPATIENT
Start: 2021-01-18 | End: 2021-02-05 | Stop reason: ALTCHOICE

## 2021-01-19 ENCOUNTER — TELEMEDICINE (OUTPATIENT)
Dept: GASTROENTEROLOGY | Facility: CLINIC | Age: 61
End: 2021-01-19
Payer: MEDICARE

## 2021-01-19 ENCOUNTER — TELEMEDICINE (OUTPATIENT)
Dept: INTERNAL MEDICINE CLINIC | Facility: CLINIC | Age: 61
End: 2021-01-19
Payer: MEDICARE

## 2021-01-19 VITALS — WEIGHT: 129 LBS | HEIGHT: 72 IN | BODY MASS INDEX: 17.47 KG/M2

## 2021-01-19 DIAGNOSIS — I95.0 IDIOPATHIC HYPOTENSION: Primary | ICD-10-CM

## 2021-01-19 DIAGNOSIS — E87.1 HYPONATREMIA: ICD-10-CM

## 2021-01-19 DIAGNOSIS — D62 ACUTE BLOOD LOSS ANEMIA: ICD-10-CM

## 2021-01-19 DIAGNOSIS — C16.2 MALIGNANT NEOPLASM OF BODY OF STOMACH (HCC): ICD-10-CM

## 2021-01-19 DIAGNOSIS — C22.1 CHOLANGIOCARCINOMA (HCC): Primary | ICD-10-CM

## 2021-01-19 DIAGNOSIS — E16.4 ZOLLINGER-ELLISON SYNDROME: ICD-10-CM

## 2021-01-19 DIAGNOSIS — E83.52 HYPERCALCEMIA: ICD-10-CM

## 2021-01-19 DIAGNOSIS — K65.2 SPONTANEOUS BACTERIAL PERITONITIS (HCC): ICD-10-CM

## 2021-01-19 DIAGNOSIS — R13.19 ESOPHAGEAL DYSPHAGIA: ICD-10-CM

## 2021-01-19 DIAGNOSIS — R18.8 OTHER ASCITES: ICD-10-CM

## 2021-01-19 DIAGNOSIS — K31.1 GASTRIC OUTLET OBSTRUCTION: ICD-10-CM

## 2021-01-19 DIAGNOSIS — K59.09 OTHER CONSTIPATION: ICD-10-CM

## 2021-01-19 PROCEDURE — 99443 PR PHYS/QHP TELEPHONE EVALUATION 21-30 MIN: CPT | Performed by: INTERNAL MEDICINE

## 2021-01-19 PROCEDURE — 99214 OFFICE O/P EST MOD 30 MIN: CPT | Performed by: INTERNAL MEDICINE

## 2021-01-19 RX ORDER — CIPROFLOXACIN 250 MG/1
250 TABLET, FILM COATED ORAL EVERY 12 HOURS SCHEDULED
Qty: 10 TABLET | Refills: 0 | Status: SHIPPED | OUTPATIENT
Start: 2021-01-19 | End: 2021-01-24

## 2021-01-19 RX ORDER — VANCOMYCIN HYDROCHLORIDE 125 MG/1
125 CAPSULE ORAL 2 TIMES DAILY
Qty: 10 CAPSULE | Refills: 0 | Status: SHIPPED | OUTPATIENT
Start: 2021-01-19 | End: 2021-01-24

## 2021-01-19 RX ORDER — LACTULOSE 20 G/30ML
10 SOLUTION ORAL DAILY
Qty: 240 ML | Refills: 1 | Status: SHIPPED | OUTPATIENT
Start: 2021-01-19 | End: 2021-02-05 | Stop reason: ALTCHOICE

## 2021-01-19 NOTE — PROGRESS NOTES
Virtual Brief Visit    Assessment/Plan:    Problem List Items Addressed This Visit        Cardiovascular and Mediastinum    Hypotension - Primary       Other    Hyponatremia     The patient had hyponatremia with a sodium level of 129  I am not sure if this hyponatremia is secondary to hypovolemic hyponatremia in addition to altered liver hemodynamics/increased portal venous pressures in the setting of cholangiocarcinoma with recent ascites status post paracentesis of 7 L  When I spoke with the patient's daughter during his visit asked her to try to utilize sodium chloride tablets crushed in applesauce of least 1 g twice daily if able in addition to increasing free water intake mildly with the increased solute intake if not he would be at risk for worsening hyponatremia  Recheck lab work next Monday  Acute blood loss anemia     Hemoglobin is 10 this is likely status post transfusion may also be an aspect of hemoconcentration no evidence of any acute bleeding right now getting is status post transfusion on Wednesday continue to follow         Hypercalcemia     I am not sure this is secondary to some degree of volume depletion/decreased effective arterial blood volume or secondary to the affects of the cancer  Will check a PTH RP  Also check intact PTH as well  Patient to go 1 Thursday for IV volume repletion for 2 hours of 500 cc bolus  This may also help with the hyponatremia as well  Recheck labs next week with the above as well  His corrected calcium was 11 2 on most repeat labs  Other ascites     The culture was negative however the patient has an elevated serum albumin ascites gradient of 1 7  This is high albumin gradient ascites likely secondary to altered portal venous pressure is less likely to any type of peritoneal carcinomatosis    The total protein of the ascitic fluid was undetectable as well likely secondary to again altered portal venous pressure is/patient with noted hepatic metastasis from either cholangiocarcinoma or stage IV gastric adenocarcinoma  The patient did have a elevated total white blood cell count of 1277 with a neutrophilic predominance  And conversations with the patient's daughter trying to do all we can to avoid hospitalization  Will utilize Cipro 250 mg twice a day for 5 days and also utilize oral vancomycin 125 mg twice daily for C diff prophylaxis during the duration of antibiotic treatment         Spontaneous bacterial peritonitis ([address] )     Again there is no history of any cirrhosis however likely patient with increased serum albumin ascites gradient secondary to increased portal venous pressures in the setting of cholangiocarcinoma with hepatic metastasis  Patient with white cell count of 1277 with a neutrophilic predominance  As per my prior conversations with the patient's daughter looking to keep the patient in the hospital as much as possible  I spoke with the daughter in the goal is to try to maintain quality of life as much as possible at home given overall poor prognosis and 2 different underlying malignancies as she is worried about how he do in the hospital   He has significant nausea now at baseline sometimes able to keep oral medications down sometimes not I worry how he would be able to tolerate high doses of antibiotics including the Cipro as when he was on high-dose intravenous ampicillin he at persistent nausea  At this point, will start oral Cipro 250 mg twice daily for 5 days  Will utilize oral vancomycin 125 mg twice daily for C diff prophylaxis given history of Clostridium difficile and is patient is at high risk for developing C diff colitis         Relevant Medications    ciprofloxacin (CIPRO) 250 mg tablet    vancomycin (VANCOCIN) 125 MG capsule                Reason for visit is No chief complaint on file         Encounter provider Norm Hu DO    Provider located at [address] [place] [address] [facility] [id number]  [phone]    Recent Visits  Date Type Provider Dept   01/13/21 Telemedicine Italia Ragsdale DO [facility]   01/13/21 Telephone Italia Ragsdale DO Medicine Physician   01/12/21 Telephone Charles Pedraza RN [facility]   01/12/21 Telemedicine Italia Ragsdale DO [facility]   Showing recent visits within past 7 days and meeting all other requirements     Today's Visits  Date Type Provider Dept   01/19/21 Al Ang, DO [facility]   Showing today's visits and meeting all other requirements     Future Appointments  No visits were found meeting these conditions  Showing future appointments within next 150 days and meeting all other requirements        After connecting through telephone, the patient was identified by name and date of birth  Zachary Jenkins  was informed that this is a telemedicine visit and that the visit is being conducted through telephone  My office door was closed  No one else was in the room  He acknowledged consent and understanding of privacy and security of the platform  The patient has agreed to participate and understands he can discontinue the visit at any time  Patient is aware this is a billable service  Subjective    Zachary Jenkins  is a 61 y o  male     HPI     The patient I spoke with on the phone as well as his daughter  He was having some difficulty swallowing  He during this last week is status post transfusion and had a recent paracentesis for over 7 L  I reviewed the results with the patient's daughter at length  He seemed to feel little bit weaker  I also reviewed the results of the most recent labs that he had done  His hemoglobin is up to 10 status post transfusion he has not been eating as much as he had an his weight status post paracentesis was down to 129 lb  He has been able to review easy year with the paracentesis    I did review the results of the paracentesis with the patient's daughter Joao Carter which demonstrated a high serum albumin ascites gradient although he had significant white cell likely suspicious for spontaneous bacterial peritonitis      Past Medical History:   Diagnosis Date    [facility] [address] )     Gastric carcinoma ([facility] [address] ) 06/2020    SHELLIE (obstructive sleep apnea)     Peptic ulcer     Zollinger-Waddell syndrome        Past Surgical History:   Procedure Laterality Date    APPENDECTOMY      CHOLECYSTECTOMY      COLONOSCOPY      IR PARACENTESIS  1/15/2021    LIVER RESECTION      STOMACH SURGERY      UPPER GASTROINTESTINAL ENDOSCOPY         Current Outpatient Medications   Medication Sig Dispense Refill    acamprosate (CAMPRAL) 333 mg tablet Take 666 mg by mouth 3 (three) times a day      albuterol (2 5 mg/3 mL) 0 083 % nebulizer solution Take 1 vial (2 5 mg total) by nebulization every 6 (six) hours as needed for wheezing or shortness of breath 90 vial 0    buPROPion (WELLBUTRIN SR) 150 mg 12 hr tablet Take 1 tablet (150 mg total) by mouth 3 (three) times a day 2 TABS IN AM ONE IN PM 90 tablet 3    ciprofloxacin (CIPRO) 250 mg tablet Take 1 tablet (250 mg total) by mouth every 12 (twelve) hours for 10 doses 10 tablet 0    clonazePAM (KlonoPIN) 0 5 mg tablet Take 1/2 tablet (0 25 mg) BID PRN for anxiety and 1 tablet (0 5 mg) QHS 60 tablet 0    fentaNYL (DURAGESIC) 100 mcg/hr TD 72 hr patch Place 1 patch on the skin every third dayMax Daily Amount: 1 patch 10 patch 0    fentaNYL (DURAGESIC) 50 mcg/hr Place 1 patch on the skin every third dayMax Daily Amount: 1 patch 10 patch 0    furosemide (LASIX) 40 mg tablet Take 1 tablet (40 mg total) by mouth daily (Patient taking differently: Take 40 mg by mouth as needed ) 30 tablet 2    gabapentin (NEURONTIN) 300 mg capsule Take 300 mg by mouth 2 (two) times a day      haloperidol (HALDOL) 0 5 mg tablet TAKE 1 TABLET BY MOUTH EVERY 6 HOURS AS NEEDED FOR NAUSEA AND VOMITTING 90 tablet 0    hyoscyamine (LEVSIN) 0 125 MG/5ML ELIX Take 125 mcg by mouth every 4 (four) hours as needed for bladder spasms      lactulose 20 g/30 mL Take 15 mL (10 g total) by mouth daily 240 mL 1    levocetirizine (XYZAL) 5 MG tablet Take 10 mg by mouth daily as needed       midodrine (PROAMATINE) 10 MG tablet TAKE 1 TABLET (10 MG TOTAL) BY MOUTH 3 (THREE) TIMES A DAY BEFORE MEALS 90 tablet 0    milk thistle 175 MG tablet Take 175 mg by mouth 2 (two) times a day      montelukast (SINGULAIR) 10 mg tablet Take 10 mg by mouth daily at bedtime      naloxone (NARCAN) 4 mg/0 1 mL nasal spray Administer 1 spray into a nostril  If no response after 2-3 minutes, give another dose in the other nostril using a new spray  1 each 1    nystatin (MYCOSTATIN) 500,000 units/5 mL suspension Apply 5 mL (500,000 Units total) to the mouth or throat 2 (two) times a day 140 mL 0    pantoprazole (PROTONIX) 40 mg tablet Take 80 mg by mouth 2 (two) times a day       potassium chloride (K-DUR,KLOR-CON) 10 mEq tablet Take 1 tablet (10 mEq total) by mouth daily (Patient taking differently: Take 10 mEq by mouth as needed ) 30 tablet 5    rivaroxaban (XARELTO) 10 mg tablet Take 1 tablet (10 mg total) by mouth daily 90 tablet 0    sodium chloride 1 g tablet Take 0 5 tablets (0 5 g total) by mouth 2 (two) times a day Please crush in applesauce when giving hold tablets if weight more than 143 pounds 60 tablet 2    traZODone (DESYREL) 150 mg tablet Take 1 tablet (150 mg total) by mouth daily at bedtime 90 tablet 0    vancomycin (VANCOCIN) 125 MG capsule Take 1 capsule (125 mg total) by mouth 2 (two) times a day for 10 doses 10 capsule 0     No current facility-administered medications for this visit  Allergies   Allergen Reactions    Amoxicillin-Pot Clavulanate GI Intolerance    Dilaudid [Hydromorphone Hcl] Other (See Comments)     Patient develops agitation and confusion      Hydromorphone      Severe hallucinations    Nsaids GI Intolerance       Review of Systems  there was increased nausea there was mild vomiting there was increased abdominal distension mild dizziness mild lightheadedness intermittent mild confusion no significant leg edema no diarrhea positive abdominal discomfort  There were no vitals filed for this visit  I spent 35 minutes with patient today in which greater than 50% of the time was spent in counseling/coordination of care regarding discussing goals of care, treatment of SBP, hyponatremia, hypercalcemiia, ascites, anemia  in this complex patient with stage IV gastric adenocarcinoma and cholangiocarcinoma    VIRTUAL VISIT DISCLAIMER    Gianfranco Gabriel  acknowledges that he has consented to an online visit or consultation  He understands that the online visit is based solely on information provided by him, and that, in the absence of a face-to-face physical evaluation by the physician, the diagnosis he receives is both limited and provisional in terms of accuracy and completeness  This is not intended to replace a full medical face-to-face evaluation by the physician  Gianfranco Gabriel  understands and accepts these terms

## 2021-01-19 NOTE — ASSESSMENT & PLAN NOTE
The patient had hyponatremia with a sodium level of 129  I am not sure if this hyponatremia is secondary to hypovolemic hyponatremia in addition to altered liver hemodynamics/increased portal venous pressures in the setting of cholangiocarcinoma with recent ascites status post paracentesis of 7 L  When I spoke with the patient's daughter during his visit asked her to try to utilize sodium chloride tablets crushed in applesauce of least 1 g twice daily if able in addition to increasing free water intake mildly with the increased solute intake if not he would be at risk for worsening hyponatremia  Recheck lab work next Monday

## 2021-01-19 NOTE — ASSESSMENT & PLAN NOTE
Again there is no history of any cirrhosis however likely patient with increased serum albumin ascites gradient secondary to increased portal venous pressures in the setting of cholangiocarcinoma with hepatic metastasis  Patient with white cell count of 1277 with a neutrophilic predominance  As per my prior conversations with the patient's daughter looking to keep the patient in the hospital as much as possible  I spoke with the daughter in the goal is to try to maintain quality of life as much as possible at home given overall poor prognosis and 2 different underlying malignancies as she is worried about how he do in the hospital   He has significant nausea now at baseline sometimes able to keep oral medications down sometimes not I worry how he would be able to tolerate high doses of antibiotics including the Cipro as when he was on high-dose intravenous ampicillin he at persistent nausea  At this point, will start oral Cipro 250 mg twice daily for 5 days    Will utilize oral vancomycin 125 mg twice daily for C diff prophylaxis given history of Clostridium difficile and is patient is at high risk for developing C diff colitis

## 2021-01-19 NOTE — ASSESSMENT & PLAN NOTE
Hemoglobin is 10 this is likely status post transfusion may also be an aspect of hemoconcentration no evidence of any acute bleeding right now getting is status post transfusion on Wednesday continue to follow

## 2021-01-19 NOTE — ASSESSMENT & PLAN NOTE
The culture was negative however the patient has an elevated serum albumin ascites gradient of 1 7  This is high albumin gradient ascites likely secondary to altered portal venous pressure is less likely to any type of peritoneal carcinomatosis  The total protein of the ascitic fluid was undetectable as well likely secondary to again altered portal venous pressure is/patient with noted hepatic metastasis from either cholangiocarcinoma or stage IV gastric adenocarcinoma  The patient did have a elevated total white blood cell count of 1277 with a neutrophilic predominance  And conversations with the patient's daughter trying to do all we can to avoid hospitalization    Will utilize Cipro 250 mg twice a day for 5 days and also utilize oral vancomycin 125 mg twice daily for C diff prophylaxis during the duration of antibiotic treatment

## 2021-01-19 NOTE — ASSESSMENT & PLAN NOTE
I am not sure this is secondary to some degree of volume depletion/decreased effective arterial blood volume or secondary to the affects of the cancer  Will check a PTH RP  Also check intact PTH as well  Patient to go 1 Thursday for IV volume repletion for 2 hours of 500 cc bolus  This may also help with the hyponatremia as well  Recheck labs next week with the above as well  His corrected calcium was 11 2 on most repeat labs

## 2021-01-19 NOTE — PATIENT INSTRUCTIONS
1  Thank you for the visit today  2  We set up for the IV fluids at [facility] on Thursday  3  We will repeat labs next week      4  I ordered Cipro 250 mg twice a day for 5 days and oral vancomycin 125 mg twice a day for Cdiff prophylaxis

## 2021-01-20 NOTE — PROGRESS NOTES
Virtual Regular Visit      Assessment/Plan:    Problem List Items Addressed This Visit        Digestive    Cholangiocarcinoma [facility]) - Primary    Gastric outlet obstruction    Gastric cancer ([email or web address] )    Zollinger-Waddell syndrome      Other Visit Diagnoses     Other constipation        Relevant Medications    lactulose 20 g/30 mL    Esophageal dysphagia        Relevant Medications    nystatin (MYCOSTATIN) 500,000 units/5 mL suspension          ASSESSMENT AND PLAN:      1  Cholangiocarcinoma ([email or web address]   2  Gastric outlet obstruction  3  Malignant neoplasm of body of stomach ([facility] )  Status post biliary and duodenal stent placement  Now with new onset ascites likely secondary to peritoneal carcinomatosis and underlying liver decompensation  He underwent large volume paracentesis in 7 L fluid removed  He received albumin infusion after large volume paracentesis  Fluid analysis showed elevated WBC  1277 and  Neutrophils 64% consistent with spontaneous bacterial peritonitis  Agree with treatment with antibiotics  Ciprofloxacin 500 mg b i d  For 7 days and then 500 mg daily indefinitely  Continue large volume paracentesis as needed with albumin infusion if more than 5 L removed  Standing order will be placed through epic    4  Other constipation  -I recommend lactulose this will help with constipation and also help remove some toxin including ammonia given underlying liver disease from meds  - lactulose 20 g/30 mL; Take 15 mL (10 g total) by mouth daily  Dispense: 240 mL; Refill: 1    5  Esophageal dysphagia  This could be secondary to uncontrolled reflux and large hiatal hernia  Given his immunocompromised state he may also have Candida esophagitis  Empirically treat him with nystatin  If no improvement in his symptoms will consider EGD for further evaluation  - nystatin (MYCOSTATIN) 500,000 units/5 mL suspension;  Apply 5 mL (500,000 Units total) to the mouth or throat 2 (two) times a day  Dispense: 140 mL; Refill: 0    6  Zollinger-Waddell syndrome  Continue pantoprazole 80 mg b i d  Continue Carafate b i d     7  Hyponatremia -follow-up with Dr Yi Martinez    8  Severe protein calorie malnutrition secondary to underlying malignancy - continue Ensure/boost supplement twice a day  Continue to eat small infrequent diet  9  Anemia secondary to slow bleeding from gastric malignancy and underlying Zollinger Waddell syndrome -he reports intermittent dark stool  Continue high-dose PPI  Monitor hemoglobin closely  If there is significant drop in hemoglobin consider endoscopic evaluation  Continue follow-up with palliative care team  ______________________________________________________________________         Reason for visit is   Chief Complaint   Patient presents with    Rectal Bleeding    Virtual Regular Visit        Encounter provider Efren Cranker, MD    Provider located at [address] [place] [address]  [phone]      Recent Visits  Date Type Provider Dept   01/19/21 Telemedicine Efren Cranker, MD [facility]   01/19/21 [address]ore[address], DO [facility] Roby Rightconchita recent visits within past 7 days and meeting all other requirements     Future Appointments  No visits were found meeting these conditions  Showing future appointments within next 150 days and meeting all other requirements        The patient was identified by name and date of birth  Yasmeen Sweeney  was informed that this is a telemedicine visit and that the visit is being conducted through [facility] and patient was informed that this is a secure, HIPAA-compliant platform  He agrees to proceed     My office door was closed  No one else was in the room  He acknowledged consent and understanding of privacy and security of the video platform   The patient has agreed to participate and understands they can discontinue the visit at any time  Patient is aware this is a billable service  Subjective -he was seen through tele video visit  His daughter was with the patient  Marla Nayak  is a 61 y o  male with cholangiocarcinoma, gastric adenocarcinoma with Mets to the liver and peritoneal carcinomatosis, Gianfranco [place] syndrome on high-dose PPI seen through tele video visit for follow-up  He developed new onset ascites and underwent large volume paracentesis and 7 L of fluid was removed  Fluid analysis showed spontaneous bacterial peritonitis  He received albumin post paracentesis  His alkaline phosphatase and AST are also elevated  I reviewed his CT scan which showed ask increased focal necrosis without significant progression of disease  He also reports decreased appetite and p o  Intake  He has progressive dysphagia    Significant cachexia          Past Medical History:   Diagnosis Date    [facility] )     Gastric carcinoma ([facility] ) 06/2020    SHELLIE (obstructive sleep apnea)     Peptic ulcer     Zollinger-Waddell syndrome        Past Surgical History:   Procedure Laterality Date    APPENDECTOMY      CHOLECYSTECTOMY      COLONOSCOPY      IR PARACENTESIS  1/15/2021    LIVER RESECTION      STOMACH SURGERY      UPPER GASTROINTESTINAL ENDOSCOPY         Current Outpatient Medications   Medication Sig Dispense Refill    acamprosate (CAMPRAL) 333 mg tablet Take 666 mg by mouth 3 (three) times a day      buPROPion (WELLBUTRIN SR) 150 mg 12 hr tablet Take 1 tablet (150 mg total) by mouth 3 (three) times a day 2 TABS IN AM ONE IN PM 90 tablet 3    clonazePAM (KlonoPIN) 0 5 mg tablet Take 1/2 tablet (0 25 mg) BID PRN for anxiety and 1 tablet (0 5 mg) QHS 60 tablet 0    fentaNYL (DURAGESIC) 100 mcg/hr TD 72 hr patch Place 1 patch on the skin every third dayMax Daily Amount: 1 patch 10 patch 0    fentaNYL (DURAGESIC) 50 mcg/hr Place 1 patch on the skin every third dayMax Daily Amount: 1 patch 10 patch 0    furosemide (LASIX) 40 mg tablet Take 1 tablet (40 mg total) by mouth daily (Patient taking differently: Take 40 mg by mouth as needed ) 30 tablet 2    gabapentin (NEURONTIN) 300 mg capsule Take 300 mg by mouth 2 (two) times a day      haloperidol (HALDOL) 0 5 mg tablet TAKE 1 TABLET BY MOUTH EVERY 6 HOURS AS NEEDED FOR NAUSEA AND VOMITTING 90 tablet 0    hyoscyamine (LEVSIN) 0 125 MG/5ML ELIX Take 125 mcg by mouth every 4 (four) hours as needed for bladder spasms      levocetirizine (XYZAL) 5 MG tablet Take 10 mg by mouth daily as needed       milk thistle 175 MG tablet Take 175 mg by mouth 2 (two) times a day      montelukast (SINGULAIR) 10 mg tablet Take 10 mg by mouth daily at bedtime      naloxone (NARCAN) 4 mg/0 1 mL nasal spray Administer 1 spray into a nostril  If no response after 2-3 minutes, give another dose in the other nostril using a new spray   1 each 1    pantoprazole (PROTONIX) 40 mg tablet Take 80 mg by mouth 2 (two) times a day       potassium chloride (K-DUR,KLOR-CON) 10 mEq tablet Take 1 tablet (10 mEq total) by mouth daily (Patient taking differently: Take 10 mEq by mouth as needed ) 30 tablet 5    rivaroxaban (XARELTO) 10 mg tablet Take 1 tablet (10 mg total) by mouth daily 90 tablet 0    sodium chloride 1 g tablet Take 0 5 tablets (0 5 g total) by mouth 2 (two) times a day Please crush in applesauce when giving hold tablets if weight more than 143 pounds 60 tablet 2    traZODone (DESYREL) 150 mg tablet Take 1 tablet (150 mg total) by mouth daily at bedtime 90 tablet 0    albuterol (2 5 mg/3 mL) 0 083 % nebulizer solution Take 1 vial (2 5 mg total) by nebulization every 6 (six) hours as needed for wheezing or shortness of breath 90 vial 0    ciprofloxacin (CIPRO) 250 mg tablet Take 1 tablet (250 mg total) by mouth every 12 (twelve) hours for 10 doses 10 tablet 0    lactulose 20 g/30 mL Take 15 mL (10 g total) by mouth daily 240 mL 1    midodrine (PROAMATINE) 10 MG tablet TAKE 1 TABLET (10 MG TOTAL) BY MOUTH 3 (THREE) TIMES A DAY BEFORE MEALS 90 tablet 0    nystatin (MYCOSTATIN) 500,000 units/5 mL suspension Apply 5 mL (500,000 Units total) to the mouth or throat 2 (two) times a day 140 mL 0    vancomycin (VANCOCIN) 125 MG capsule Take 1 capsule (125 mg total) by mouth 2 (two) times a day for 10 doses 10 capsule 0     No current facility-administered medications for this visit  Allergies   Allergen Reactions    Amoxicillin-Pot Clavulanate GI Intolerance    Dilaudid [Hydromorphone Hcl] Other (See Comments)     Patient develops agitation and confusion   Hydromorphone      Severe hallucinations    Nsaids GI Intolerance       Review of Systems    Video Exam    Vitals:    01/19/21 1408   Weight: 58 5 kg (129 lb)   Height: 6' (1 829 m)       Physical Exam   REVIEW OF SYSTEMS:    CONSTITUTIONAL: Denies any fever, chills, rigors, and weight loss  HEENT: No earache or tinnitus  Denies hearing loss or visual disturbances  CARDIOVASCULAR: No chest pain or palpitations  RESPIRATORY: Denies any cough, hemoptysis, shortness of breath or dyspnea on exertion  GASTROINTESTINAL: As noted in the History of Present Illness  GENITOURINARY: No problems with urination  Denies any hematuria or dysuria  NEUROLOGIC: No dizziness or vertigo, denies headaches  MUSCULOSKELETAL: Denies any muscle or joint pain  SKIN: Denies skin rashes or itching  ENDOCRINE: Denies excessive thirst  Denies intolerance to heat or cold  PSYCHOSOCIAL: Denies depression or anxiety  Denies any recent memory loss  PHYSICAL EXAMINATION:  Appearance and vitals taken from home devices    General Appearance:   Alert, cooperative, no distress, thin   HEENT:  Normocephalic, atraumatic, anicteric  Neck supple, symmetrical, trachea midline  Lungs:   Equal chest rise and unlabored breathing, normal effort, no coughing  Cardiovascular:   No visualized JVD  Abdomen:    Moderate distension   Skin: No jaundice, rashes, or lesions  Musculoskeletal:   Normal range of motion visualized  Psych:  Normal affect and normal insight  Neuro:  Alert and appropriate  I spent 23 minutes directly with the patient during this visit      [address]  acknowledges that he has consented to an online visit or consultation  He understands that the online visit is based solely on information provided by him, and that, in the absence of a face-to-face physical evaluation by the physician, the diagnosis he receives is both limited and provisional in terms of accuracy and completeness  This is not intended to replace a full medical face-to-face evaluation by the physician  Tiffanie Kimble  understands and accepts these terms

## 2021-01-21 ENCOUNTER — HOSPITAL ENCOUNTER (OUTPATIENT)
Dept: INFUSION CENTER | Facility: HOSPITAL | Age: 61
Discharge: HOME | End: 2021-01-21
Attending: INTERNAL MEDICINE
Payer: MEDICARE

## 2021-01-21 ENCOUNTER — TELEPHONE (OUTPATIENT)
Dept: INTERNAL MEDICINE CLINIC | Facility: CLINIC | Age: 61
End: 2021-01-21

## 2021-01-21 DIAGNOSIS — R18.8 OTHER ASCITES: Primary | ICD-10-CM

## 2021-01-21 DIAGNOSIS — E83.52 HYPERCALCEMIA: Primary | ICD-10-CM

## 2021-01-21 DIAGNOSIS — R29.898 WEAKNESS OF BOTH LEGS: ICD-10-CM

## 2021-01-21 DIAGNOSIS — C22.1 CHOLANGIOCARCINOMA (HCC): Primary | ICD-10-CM

## 2021-01-21 DIAGNOSIS — I95.0 IDIOPATHIC HYPOTENSION: ICD-10-CM

## 2021-01-21 LAB
ALBUMIN SERPL BCP-MCNC: 2.2 G/DL (ref 3.5–5)
ALP SERPL-CCNC: 726 U/L (ref 46–116)
ALT SERPL W P-5'-P-CCNC: 42 U/L (ref 12–78)
ANION GAP SERPL CALCULATED.3IONS-SCNC: 6 MMOL/L (ref 4–13)
AST SERPL W P-5'-P-CCNC: 49 U/L (ref 5–45)
BASOPHILS # BLD AUTO: 0.02 THOUSANDS/ΜL (ref 0–0.1)
BASOPHILS NFR BLD AUTO: 0 % (ref 0–1)
BILIRUB SERPL-MCNC: 0.7 MG/DL (ref 0.2–1)
BUN SERPL-MCNC: 26 MG/DL (ref 5–25)
CALCIUM ALBUM COR SERPL-MCNC: 10.7 MG/DL (ref 8.3–10.1)
CALCIUM SERPL-MCNC: 9.3 MG/DL (ref 8.3–10.1)
CHLORIDE SERPL-SCNC: 96 MMOL/L (ref 100–108)
CO2 SERPL-SCNC: 26 MMOL/L (ref 21–32)
CREAT SERPL-MCNC: 0.97 MG/DL (ref 0.6–1.3)
EOSINOPHIL # BLD AUTO: 0.02 THOUSAND/ΜL (ref 0–0.61)
EOSINOPHIL NFR BLD AUTO: 0 % (ref 0–6)
ERYTHROCYTE [DISTWIDTH] IN BLOOD BY AUTOMATED COUNT: 17.1 % (ref 11.6–15.1)
GFR SERPL CREATININE-BSD FRML MDRD: 84 ML/MIN/1.73SQ M
GLUCOSE SERPL-MCNC: 124 MG/DL (ref 65–140)
HCT VFR BLD AUTO: 31.4 % (ref 36.5–49.3)
HGB BLD-MCNC: 9.5 G/DL (ref 12–17)
IMM GRANULOCYTES # BLD AUTO: 0.03 THOUSAND/UL (ref 0–0.2)
IMM GRANULOCYTES NFR BLD AUTO: 0 % (ref 0–2)
LYMPHOCYTES # BLD AUTO: 0.53 THOUSANDS/ΜL (ref 0.6–4.47)
LYMPHOCYTES NFR BLD AUTO: 7 % (ref 14–44)
MAGNESIUM SERPL-MCNC: 1.8 MG/DL (ref 1.6–2.6)
MCH RBC QN AUTO: 27.6 PG (ref 26.8–34.3)
MCHC RBC AUTO-ENTMCNC: 30.3 G/DL (ref 31.4–37.4)
MCV RBC AUTO: 91 FL (ref 82–98)
MONOCYTES # BLD AUTO: 0.75 THOUSAND/ΜL (ref 0.17–1.22)
MONOCYTES NFR BLD AUTO: 9 % (ref 4–12)
NEUTROPHILS # BLD AUTO: 6.82 THOUSANDS/ΜL (ref 1.85–7.62)
NEUTS SEG NFR BLD AUTO: 84 % (ref 43–75)
NRBC BLD AUTO-RTO: 0 /100 WBCS
PLATELET # BLD AUTO: 315 THOUSANDS/UL (ref 149–390)
PMV BLD AUTO: 10.1 FL (ref 8.9–12.7)
POTASSIUM SERPL-SCNC: 4.9 MMOL/L (ref 3.5–5.3)
PROT SERPL-MCNC: 6.1 G/DL (ref 6.4–8.2)
RBC # BLD AUTO: 3.44 MILLION/UL (ref 3.88–5.62)
SODIUM SERPL-SCNC: 128 MMOL/L (ref 136–145)
WBC # BLD AUTO: 8.17 THOUSAND/UL (ref 4.31–10.16)

## 2021-01-21 PROCEDURE — 85025 COMPLETE CBC W/AUTO DIFF WBC: CPT | Performed by: INTERNAL MEDICINE

## 2021-01-21 PROCEDURE — 96360 HYDRATION IV INFUSION INIT: CPT

## 2021-01-21 PROCEDURE — 80053 COMPREHEN METABOLIC PANEL: CPT | Performed by: INTERNAL MEDICINE

## 2021-01-21 PROCEDURE — 96361 HYDRATE IV INFUSION ADD-ON: CPT

## 2021-01-21 PROCEDURE — 83735 ASSAY OF MAGNESIUM: CPT | Performed by: INTERNAL MEDICINE

## 2021-01-21 RX ADMIN — SODIUM CHLORIDE 500 ML: 0.9 INJECTION, SOLUTION INTRAVENOUS at 10:14

## 2021-01-21 NOTE — TELEPHONE ENCOUNTER
Katja Anderson called on behalf of Blanca Saleh, and a mattress pad that was to be ordered to [facility]  Rey Becker states to have called [employer] about the medical equipment ,yet [facility] states that they have not received an order for the mattress pad  Rey Becker than stated that she had spoken to [facility] about putting an order in for the mattress pad  Will talk to Jossue López, or Dr Ankita Rivas about the order for the mattress

## 2021-01-22 ENCOUNTER — OFFICE VISIT (OUTPATIENT)
Dept: PALLIATIVE MEDICINE | Facility: CLINIC | Age: 61
End: 2021-01-22
Payer: MEDICARE

## 2021-01-22 ENCOUNTER — TELEPHONE (OUTPATIENT)
Dept: OTHER | Facility: HOSPITAL | Age: 61
End: 2021-01-22

## 2021-01-22 VITALS
WEIGHT: 139.99 LBS | HEIGHT: 72 IN | OXYGEN SATURATION: 99 % | TEMPERATURE: 95.7 F | BODY MASS INDEX: 18.96 KG/M2 | RESPIRATION RATE: 16 BRPM

## 2021-01-22 DIAGNOSIS — R11.0 NAUSEA: ICD-10-CM

## 2021-01-22 DIAGNOSIS — C22.1 CHOLANGIOCARCINOMA (HCC): Primary | ICD-10-CM

## 2021-01-22 DIAGNOSIS — G89.3 CANCER RELATED PAIN: ICD-10-CM

## 2021-01-22 DIAGNOSIS — C16.2 MALIGNANT NEOPLASM OF BODY OF STOMACH (HCC): ICD-10-CM

## 2021-01-22 DIAGNOSIS — D62 ACUTE BLOOD LOSS ANEMIA: ICD-10-CM

## 2021-01-22 DIAGNOSIS — E16.4 ZOLLINGER-ELLISON SYNDROME: ICD-10-CM

## 2021-01-22 DIAGNOSIS — C22.1 CHOLANGIOCARCINOMA (HCC): ICD-10-CM

## 2021-01-22 DIAGNOSIS — R18.8 OTHER ASCITES: ICD-10-CM

## 2021-01-22 DIAGNOSIS — E43 SEVERE PROTEIN-CALORIE MALNUTRITION (HCC): ICD-10-CM

## 2021-01-22 DIAGNOSIS — E87.1 HYPONATREMIA: ICD-10-CM

## 2021-01-22 DIAGNOSIS — I95.0 IDIOPATHIC HYPOTENSION: ICD-10-CM

## 2021-01-22 LAB — SARS-COV-2 RNA RESP QL NAA+PROBE: NEGATIVE

## 2021-01-22 PROCEDURE — 99214 OFFICE O/P EST MOD 30 MIN: CPT | Performed by: INTERNAL MEDICINE

## 2021-01-22 PROCEDURE — U0005 INFEC AGEN DETEC AMPLI PROBE: HCPCS | Performed by: INTERNAL MEDICINE

## 2021-01-22 PROCEDURE — U0003 INFECTIOUS AGENT DETECTION BY NUCLEIC ACID (DNA OR RNA); SEVERE ACUTE RESPIRATORY SYNDROME CORONAVIRUS 2 (SARS-COV-2) (CORONAVIRUS DISEASE [COVID-19]), AMPLIFIED PROBE TECHNIQUE, MAKING USE OF HIGH THROUGHPUT TECHNOLOGIES AS DESCRIBED BY CMS-2020-01-R: HCPCS | Performed by: INTERNAL MEDICINE

## 2021-01-22 RX ORDER — ONDANSETRON 8 MG/1
8 TABLET, ORALLY DISINTEGRATING ORAL EVERY 8 HOURS PRN
Qty: 20 TABLET | Refills: 0 | Status: SHIPPED | OUTPATIENT
Start: 2021-01-22

## 2021-01-22 RX ORDER — OXYCODONE HYDROCHLORIDE 20 MG/1
20 TABLET ORAL EVERY 4 HOURS PRN
Qty: 180 TABLET | Refills: 0 | Status: SHIPPED | OUTPATIENT
Start: 2021-01-22 | End: 2021-02-02

## 2021-01-22 RX ORDER — HALOPERIDOL 2 MG/ML
0.5 SOLUTION ORAL EVERY 6 HOURS PRN
Qty: 120 ML | Refills: 0 | Status: SHIPPED | OUTPATIENT
Start: 2021-01-22 | End: 2021-02-02

## 2021-01-22 NOTE — TELEPHONE ENCOUNTER
The patient I spoke with the patient's daughter on the phone  He is status post IV infusion yesterday he saw pain management palliative care any seems to be doing a little bit better  He is tolerating the supine with that has a high sodium content seems to be tolerating it better than the salt tabs I advised the daughter to utilize that instead of the salt tabs  Again I called to see how the patient was doing  He is scheduled to go for a paracentesis on Monday  Certainly his increased alkaline phosphatase and increased portal venous pressure is likely secondary to worsening cholangiocarcinoma  At this point he is scheduled to go for paracentesis on Monday with albumin supplementation  I called both the infusion center at [facility] to verify he would be there as well as calling the interventional radiology  The order for the albumin post paracentesis calls her 50 g of albumin which he needs to help maintain hemodynamics status the order is good for least the next 6 months and CP per were which is used for the infusion center for this particular order for intravenous albumin  Labs are ordered for Monday will follow-up on the above labs  Will also send message to GI after repeat labs on Monday

## 2021-01-22 NOTE — PATIENT INSTRUCTIONS
Ascites   WHAT YOU NEED TO KNOW:   What is ascites? Ascites is a buildup of fluid in your lower abdomen  The fluid causes swelling  Ascites can signal a more serious problem in your body  What causes ascites? · Liver disease, such as cirrhosis or hepatitis     · Cancer     · Congestive heart failure     · Blood clots in the veins that enter and leave the liver    What are the signs and symptoms of ascites? · Rapid weight gain and swelling     · Swollen abdomen     · Shortness of breath     · Nausea     · A feeling of fullness after eating little food     · Stretch marks and bulging veins on the abdomen    How is ascites diagnosed? Your healthcare provider will ask about your symptoms and examine you  Tell him or her about your medical history and any medicines you take  He or she may ask about alcohol use, drug use, sexual activity, or blood transfusions you may have had  You may need any of the following:  · Blood and urine tests  may show infection, kidney function, or provide information about your overall health  · An ultrasound or CT  may show the fluid in your abdomen  You may be given contrast liquid to help your organs show up better in the pictures  Tell the healthcare provider if you have ever had an allergic reaction to contrast liquid  · A paracentesis  is a procedure used to take a sample of fluid from your abdomen  The fluid is tested for the cause of your ascites and to check for infection  · A 24-hour urine collection  may be ordered by your healthcare provider  You will use a container to hold all of your urine collected over 24 hours  The urine must be kept cold until it is tested  Ask your healthcare provider for more information about this test     · Other tests to find the cause of your ascites  may be needed  Tests such as endoscopy, biopsy, or laparoscopy of your lower esophagus or liver may be used   These tests help healthcare providers plan the best treatment for your ascites  How is ascites treated? Ascites treatment usually combines medicines with changes to your nutrition  You may need any of the following:  · Medicines  help decrease the fluid in your abdomen, prevent or fight an infection, or prevent more damage to your liver  · Limit the amount of sodium (salt) and liquid  in your diet  This will help decrease the fluid in your abdomen  Ask your healthcare provider or dietitian for more information  · Procedures:      ? A paracentesis  is a procedure used to drain the extra fluid from your abdomen through a needle  You may need more than one paracentesis  ? Transjugular intrahepatic portosystemic shunt (TIPS)  is a procedure used to treat large ascites when you cannot have paracentesis  Your healthcare provider uses a catheter (plastic tube) to increase blood flow through your liver  This helps to reduce the fluid in your abdomen  ? A peritoneovenous shunt  is a procedure used to drain the extra fluid into a large vein to be absorbed by the body  The shunt is a tube placed in your abdomen and connected to the vein  ? Liver transplant  may be needed if your liver damage is severe  How do I manage my symptoms? · Do not drink alcohol or take medicines that contain alcohol  Alcohol worsens the damage to your liver  Your symptoms may improve after you stop drinking  Ask your healthcare provider for information if you need help to quit drinking alcohol  · Follow your low-sodium plan  A dietitian can help you create a low-sodium diet  He or she may suggest lemon juice or herbs to flavor your food  Avoid salted butter or margarine, milk, cheese, and canned or frozen foods  Ask about salt substitutes  · Weigh yourself each day in the morning  Keep a record of your weights  Take this record to your follow-up visits  · Limit activity as directed  You may need to limit your usual daily activities until your symptoms have resolved   Ask your provider if you have any limits to your activity  · Ask about NSAIDs, such as aspirin and ibuprofen  NSAIDs may not be safe if you have trouble urinating  Ask your healthcare provider if NSAIDs are safe for you  These medicines can cause stomach bleeding or kidney problems if they are not taken correctly  Call your local emergency number (911 in the [address]) if:   · You have trouble breathing  · You feel confused, faint, or lose consciousness  · You vomit blood or see blood in your bowel movement  When should I call my doctor? · You feel pain in your abdomen  · You have a fever  · You are losing more or less weight than expected  · You are urinating less than usual      · You feel dizzy or lightheaded  · You develop tiredness, dry mouth, nausea, or vomiting  · You have muscle cramps or twitches  · You have questions or concerns about your condition or care  CARE AGREEMENT:   You have the right to help plan your care  Learn about your health condition and how it may be treated  Discuss treatment options with your healthcare providers to decide what care you want to receive  You always have the right to refuse treatment  The above information is an  only  It is not intended as medical advice for individual conditions or treatments  Talk to your doctor, nurse or pharmacist before following any medical regimen to see if it is safe and effective for you  © Copyright [address] Information is for End User's use only and may not be sold, redistributed or otherwise used for commercial purposes  All illustrations and images included in CareNotes® are the copyrighted property of [employer]  or [facility] Doreen Bhat   Pharmacological Management of Cancer Pain   WHAT YOU NEED TO KNOW:   Cancer pain may be short-term or long-term  It may come and go  You may have pain if the tumor damages or blocks tissues, nerves, and blood vessels as it becomes larger   Some cancer cells may produce chemicals that cause pain  Chemotherapy, radiation therapy, or surgery may cause pain  Pain management is an important part of cancer care  DISCHARGE INSTRUCTIONS:   Call 911 for any of the following:   · Your arm or leg feels warm, tender, and painful  It may look swollen and red  · You suddenly feel lightheaded and short of breath  · You have chest pain when you take a deep breath or cough  · You cough up blood  Return to the emergency department if:   · You feel more pain even after you take your pain medicine  · You feel so depressed that you cannot cope  · You feel very anxious or irritable after you take your medicines  · You have problems thinking clearly  · You cannot control when you urinate or have a bowel movement  Contact your healthcare provider or oncologist if:   · You have a fever  · You have chills, a cough, or feel weak and achy  · You have nausea or vomiting  · Your skin is itchy, swollen, or has a rash  · You have questions or concerns about your condition or care  Medicines: You may need any of the following:  · Acetaminophen  decreases pain  Acetaminophen is available without a doctor's order  Ask how much to take and how often to take it  Follow directions  Acetaminophen can cause liver damage if not taken correctly  · NSAIDs , such as ibuprofen, help decrease swelling, pain, and fever  This medicine is available with or without a doctor's order  NSAIDs can cause stomach bleeding or kidney problems in certain people  If you take blood thinner medicine, always ask your healthcare provider if NSAIDs are safe for you  Always read the medicine label and follow directions  · Narcotic analgesics  are used for moderate to severe pain  They may be used to control cancer pain or after surgery and other procedures       · Other medicines  include steroids, antidepressants, antianxiety medicine, muscle relaxers, bisphosphonates, or anticonvulsants  They may be used with your pain medicine to help decrease pain  Ask your healthcare provider or pain specialist for more information about these medicines  · Take your medicine as directed  Contact your healthcare provider if you think your medicine is not helping or if you have side effects  Tell him of her if you are allergic to any medicine  Keep a list of the medicines, vitamins, and herbs you take  Include the amounts, and when and why you take them  Bring the list or the pill bottles to follow-up visits  Carry your medicine list with you in case of an emergency  Follow up with your healthcare provider or oncologist as directed:  Write down your questions so you remember to ask them during your visits  Self-care:   · Rest as often as you need to  Rest is important for your recovery  Do not return to your regular activities too quickly  Start slowly and do more as you feel stronger  Rest during the day  Plan for 6 to 8 hours of sleep each night  Contact your healthcare provider if you are not able to sleep  · Go to rehabilitation as directed  Rehabilitation may include physical and occupational therapy  A physical therapist teaches you exercises to help improve movement and strength, and to decrease pain  An occupational therapist teaches you skills to help with your daily activities  · Exercise as directed  Activity may help increase your strength and control cancer pain  Ask your healthcare provider or pain specialist about the best exercise plan for you  · Keep a pain diary  A pain diary may help track pain cycles so you know when and how your pain starts and ends  Include anything that makes your pain worse or better  Bring the pain diary to follow-up visits with your healthcare provider  · Prevent bed sores  You may need an egg crate or air mattress on your bed to help prevent bed sores   If you cannot move by yourself, someone will need to turn you from side to side often  © 2017 [address] [facility] Information is for End User's use only and may not be sold, redistributed or otherwise used for commercial purposes  All illustrations and images included in CareNotes® are the copyrighted property of [employer]  or Jhony Burciaga  The above information is an  only  It is not intended as medical advice for individual conditions or treatments  Talk to your doctor, nurse or pharmacist before following any medical regimen to see if it is safe and effective for you

## 2021-01-22 NOTE — PROGRESS NOTES
Outpatient Follow-Up - Palliative and Supportive Care   Sarahy Baron  61 y o  male [id number]    Assessment & Plan  1  Cholangiocarcinoma ([facility] [address] )    2  Malignant neoplasm of body of stomach (HCC)    3  Zollinger-Waddell syndrome    4  Cancer related pain    5  Severe protein-calorie malnutrition ([facility] [address] )    6  Nausea        #symptom management   PDMP Reviewed              - continue oxycodone 20 mg PO Q4H PRN last filled 12/24/2020 for 30-day Rx [180 tabs]                          - next refill due on 01/22/2021    - Rx sent to pharmacy                          - narcan recommended given high MEDD requirements              - increase fentanyl patch 200 mcg/h TD Q72H                          - recent Rx for fentanyl 50 mcg/h patches x 10 filled on 01/07/2021    - recent Rx for fentanyl 100 mcg/h patches x 10 filled on 01/06/2021                          - current Rx filled with increased dose should last 21 days [daughter to call when refill needed]              - continue clonazepam 0 25 mg PO BID PRN and 0 5 mg PO QHS last filled 12/19/2020 for 30-day Rx [60 tabs]                          - reports taking less dosage/day with ample amount at home, no refill needed at this time   - continue bowel regimen to prevent OIC   - recent trial of lactulose   - continue nausea regimen              - continue ondansetron ODT 8 mg PO Q6H PRN    - Rx sent to pharamacy              - continue haloperidol 0 5 mg PO Q6H PRN    - Rx sent to pharmacy   - continue gabapentin 300 mg PO BID   - continue bupropion 150 mg PO TID   - continue trazodone 150 mg PO QHS   - will continue to closely monitor symptoms    #goals of care   - treatment focused care with no limitations at this time   - will coordinate with  for recommendations to facilitate DME mattress pad    #psychosocial support   - emotional support provided      [address] [facility] [address] Follow up in 4 weeks        Medications adjusted this encounter:  Requested Prescriptions     Signed Prescriptions Disp Refills    ondansetron (ZOFRAN-ODT) 8 mg disintegrating tablet 20 tablet 0     Sig: Take 1 tablet (8 mg total) by mouth every 8 (eight) hours as needed for nausea or vomiting    haloperidol (HALDOL) 1 mg/0 5 mL oral concentrated solution 120 mL 0     Sig: Take 0 25 mL (0 5 mg total) by mouth every 6 (six) hours as needed (nausea)    oxyCODONE (ROXICODONE) 20 MG TABS 180 tablet 0     Sig: Take 1 tablet (20 mg total) by mouth every 4 (four) hours as needed for moderate pain (cancer-related pain)Max Daily Amount: 120 mg     No orders of the defined types were placed in this encounter  There are no discontinued medications  Mr Marleny Turpin was seen today for symptoms and planning cares related to above illnesses  I have reviewed the patient's controlled substance dispensing history in the Prescription Drug Monitoring Program in compliance with the [facility] regulations before prescribing any controlled substances  Tawana Flores is invited to continue to follow with us  If there are questions or concerns, please contact us through our clinic/answering service 24 hours a day, seven days a week  Peng Rodriguez MD  [facility]        Visit Information    Accompanied By: daughter [Christin]    Source of History: Self, Family member, Medical record    History Limitations: None    Follow up visit for:  symptom management, pain, neoplasm related, nausea    History of Present Illness    Ap Bustamante Jr  is a 61 y  o  male with a PMH of stage IV gastric adenocarcinoma with known liver mets + peritoneal carcinomatosis c/b biliary obstruction s/p biliary stent + duodenal stent, cholangiocarcinoma, prior h/o recurrent SBO with prior venting G-tube with recent removal 2/2 site infection who presents to [facility] for routine follow up      Primary Oncology care @ [facility]    Patient reports worsening abdominal pain in the setting of progressive abdominal distension; plan for IR paracentesis in 3 days  At this time, wishes to increase fentanyl patch to 200 mcg/h as previous discussed  Taking 5-6 doses/day of oxy-IR 20 mg  Persistent nausea without vomiting; alternating zofran + haldol with adequate relief  Requesting change to ODT formulation for zofran and liquid formulation for haldol  Challenged PO intake in the setting of xerostomia  Recent constipation with improved BM with lactulose  Currently treated OP for SBP with cipro x 5 days; completed course  IR Paracentesis [01/15/2021] with 7L of clear, yellow fluid aspirated  Scheduled IR paracentesis for 01/25/2021  Patient currently managed OP for hyponatremia [recently 128 on lab yesterday]  CT A/P [01/13/2021] multiple hepatic metastatic lesions, some of which with increasing necrosis, no definite progression identified, remnant liver somewhat smaller in size; CBD stent in place without dilation; marked abdominal/pelvic ascites; progressive cachexia; small R > L pleural effusions  Patient reports falling 2 weeks ago with reported small hairline fracture; boot was in place x 2 weeks; currently now weight bearing with assistance of cane  Reports two pressure wounds with request for assistance obtaining DME mattress pad  Past medical, surgical, social, and family histories are reviewed and pertinent updates are made  Review of Systems   Constitution: Positive for decreased appetite, malaise/fatigue and weight loss  Negative for chills and fever  HENT: Negative for congestion  Cardiovascular: Negative for chest pain  Respiratory: Negative for shortness of breath  Musculoskeletal: Positive for back pain  Gastrointestinal: Positive for bloating, abdominal pain and nausea  Negative for constipation, diarrhea, hematochezia and vomiting  Genitourinary: Negative for frequency  Psychiatric/Behavioral: Positive for depression  Negative for hallucinations  The patient is nervous/anxious            Vital Signs    Temp Marylin Francois ) 95 7 °F (35 4 °C) (Temporal)   Resp 16   Ht 6' (1 829 m)   Wt 63 5 kg (139 lb 15 9 oz)   SpO2 99%   BMI 18 99 kg/m²     Physical Exam and Objective Data  Physical Exam  Constitutional:       Appearance: He is cachectic  Comments: Chronically ill appearing in NAD  Non-toxic appearing   HENT:      Head: Normocephalic and atraumatic  Right Ear: External ear normal       Left Ear: External ear normal    Eyes:      Comments: No gaze preference   Neck:      Musculoskeletal: Normal range of motion  Pulmonary:      Effort: No tachypnea, accessory muscle usage or respiratory distress  Comments: Completes full sentences without difficulty  Musculoskeletal:      Comments: No LE edema   Skin:     Coloration: Skin is pale  Neurological:      General: No focal deficit present  Mental Status: He is alert             Radiology and Laboratory:  I personally reviewed and interpreted the following results:    Most Recent COVID-19 Results:  Lab Results   Component Value Date/Time    SARSCOV2 Not Detected 01/11/2021 04:35 PM    [address] [facility] Not Detected 01/05/2021 10:19 AM       Most Recent Lab Work:  Lab Results   Component Value Date/Time    SODIUM 128 (L) 01/21/2021 10:14 AM    K 4 9 01/21/2021 10:14 AM    K 3 4 (L) 10/23/2015 12:15 PM    BUN 26 (H) 01/21/2021 10:14 AM    BUN 4 (L) 10/23/2015 12:15 PM    CREATININE 0 97 01/21/2021 10:14 AM    CREATININE 1 15 10/23/2015 12:15 PM    GLUC 124 01/21/2021 10:14 AM     Lab Results   Component Value Date/Time    AST 49 (H) 01/21/2021 10:14 AM    AST 14 10/23/2015 12:15 PM    ALT 42 01/21/2021 10:14 AM    ALT 29 10/23/2015 12:15 PM    ALB 2 2 (L) 01/21/2021 10:14 AM    ALB 3 0 (L) 10/23/2015 12:15 PM     Lab Results   Component Value Date/Time    HGB 9 5 (L) 01/21/2021 10:14 AM    HGB 9 2 (L) 10/23/2015 12:15 PM    WBC 8 17 01/21/2021 10:14 AM    WBC 7 44 10/23/2015 12:15 PM     01/21/2021 10:14 AM     (H) 10/23/2015 12:15 PM    INR 2 49 (H) 01/12/2021 11:40 AM    INR 1 11 10/23/2015 12:15 PM    PTT 51 (H) 07/16/2018 03:06 PM    PTT 35 10/23/2015 12:15 PM       Most Recent Imaging (w/i last 30-days):  Procedure: Ct Abdomen Pelvis Wo Contrast    Result Date: 1/13/2021  Narrative: CT ABDOMEN AND PELVIS WITHOUT IV CONTRAST INDICATION:   D62: Acute posthemorrhagic anemia I95 0: Idiopathic hypotension R14 0: Abdominal distension (gaseous) C22 1: Intrahepatic bile duct carcinoma  COMPARISON:  CT 10/29/2020; MRI 12/9/2020 TECHNIQUE:  CT examination of the abdomen and pelvis was performed without intravenous contrast   Axial, sagittal, and coronal 2D reformatted images were created from the source data and submitted for interpretation  Radiation dose length product (DLP) for this visit:  474 23 mGy-cm   This examination, like all CT scans performed in the [facility], was performed utilizing techniques to minimize radiation dose exposure, including the use of iterative  reconstruction and automated exposure control  Enteric contrast was not administered  FINDINGS: The study is limited due to lack of intravenous and enteric contrast as well as paucity of intra-abdominal fat  ABDOMEN LOWER CHEST:  Small right greater than left pleural effusions are present  There is probable compressive atelectasis present in the right lung base  Scattered calcified granulomata in both lung bases again noted  The heart is normal in size  LIVER/BILIARY TREE:  There is been left hepatic resection similar to the prior study  Multiple hepatic metastatic lesions are again noted some of which appear slightly more necrotic and none of which appear to have enlarged  Some of the lesions appear to have diminished in size since the prior examination  Overall liver remnant is of diminished size compared to the prior study from 10/29/2020  There is a common bile duct stent again noted which extends from the right biliary system into the duodenum    There is no definite biliary ductal dilatation and there is some air/gas noted in the stent likely refluxing from the duodenum  No serg pneumobilia identified  GALLBLADDER:  Gallbladder is surgically absent  SPLEEN:  Unremarkable  PANCREAS:  Suboptimally visualized but no gross abnormality noted  Pancreas appears diffusely atrophic  ADRENAL GLANDS:  Unremarkable  KIDNEYS/URETERS:  Unremarkable  No hydronephrosis  STOMACH AND BOWEL: Paraesophageal hiatal hernia again noted of moderate size  Likely ingested material in the gastric lumen which is nondilated  There is a gastroduodenal stent present which has some fluid and debris within it but which is not grossly obstructed  No small bowel dilatation appreciated  There is mild gaseous distention of some centrally located loops of colon without serg intestinal obstruction identified  Mild amount retained fecal material in the colon and rectum present  APPENDIX:  No findings to suggest appendicitis  ABDOMINOPELVIC CAVITY:  Marked ascites present near since the CT of 10/29/2020 and increased when compared to the MRI study 12/9/2020  Mildly prominent retroperitoneal para-aortic and celiac lymph nodes noted  VESSELS:  Unremarkable for patient's age  PELVIS REPRODUCTIVE ORGANS:  Unremarkable for patient's age  URINARY BLADDER:  Unremarkable  ABDOMINAL WALL/INGUINAL REGIONS:  Marked cachexia  No hernia  OSSEOUS STRUCTURES:  Status post percutaneous vertebral augmentation at T11 and L1  No definite lucent or sclerotic osseous lesions appreciated  Impression: 1  Technically limited study  2   Multiple hepatic metastatic lesions are again identified some of which are more necrotic than on the prior exams  No definite metastatic progression identified  Remnant liver is somewhat smaller than on previous studies  3   Common bile duct stent in place without serg biliary ductal dilatation   4   Postoperative change involving the stomach with gastroduodenal stent in place without evidence for obstruction  5   Marked abdominal and pelvic ascites which has progressed since the prior examinations  6   Progressive cachexia  7   Small right greater than left pleural effusions  8   No definite evidence for acute hemorrhage in the abdomen or pelvis  The study was marked in [facility] for immediate notification  Workstation performed: [id number]       40 minutes was spent face to face with Lucretia Polanco  and his daughter with greater than 50% of the time spent in counseling or coordination of care including discussions of chart review, symptoms assessment, opioid regimen adjustment, nausea regimen formulation change  All of the patient's questions were answered during this discussion

## 2021-01-25 ENCOUNTER — TELEPHONE (OUTPATIENT)
Dept: INTERNAL MEDICINE CLINIC | Facility: CLINIC | Age: 61
End: 2021-01-25

## 2021-01-25 ENCOUNTER — TELEMEDICINE (OUTPATIENT)
Dept: INTERNAL MEDICINE CLINIC | Facility: CLINIC | Age: 61
End: 2021-01-25
Payer: COMMERCIAL

## 2021-01-25 ENCOUNTER — HOSPITAL ENCOUNTER (OUTPATIENT)
Dept: INFUSION CENTER | Facility: HOSPITAL | Age: 61
Discharge: HOME | End: 2021-01-25

## 2021-01-25 DIAGNOSIS — D62 ACUTE BLOOD LOSS ANEMIA: ICD-10-CM

## 2021-01-25 DIAGNOSIS — I95.0 IDIOPATHIC HYPOTENSION: ICD-10-CM

## 2021-01-25 DIAGNOSIS — R19.7 DIARRHEA: ICD-10-CM

## 2021-01-25 DIAGNOSIS — R19.7 DIARRHEA: Primary | ICD-10-CM

## 2021-01-25 DIAGNOSIS — E87.1 HYPONATREMIA: Primary | ICD-10-CM

## 2021-01-25 DIAGNOSIS — Z71.89 GOALS OF CARE, COUNSELING/DISCUSSION: ICD-10-CM

## 2021-01-25 DIAGNOSIS — R19.7 DIARRHEA, UNSPECIFIED TYPE: ICD-10-CM

## 2021-01-25 DIAGNOSIS — R18.8 OTHER ASCITES: ICD-10-CM

## 2021-01-25 PROCEDURE — 99443 PR PHYS/QHP TELEPHONE EVALUATION 21-30 MIN: CPT | Performed by: INTERNAL MEDICINE

## 2021-01-25 RX ORDER — VANCOMYCIN HYDROCHLORIDE 125 MG/1
125 CAPSULE ORAL 4 TIMES DAILY
Qty: 40 CAPSULE | Refills: 0 | Status: SHIPPED | OUTPATIENT
Start: 2021-01-25 | End: 2021-02-05 | Stop reason: ALTCHOICE

## 2021-01-25 NOTE — TELEPHONE ENCOUNTER
Allyne Husbands called and questions whether or not Pina Rodrigues should go for paracentesis today  He had blood in stools starting at 2 am totalling 3 stools until now  He began dexamethasone 2 mg bid on Friday he is taking protonix 80 mg bid and she started him back on the carafate  No other new symptoms noted  She is closely monitoring him  Only want ER as last resort  I spoke with Dr Homero Richardson and he suggest to Rs paracentesis and he will call to Allyne Husbands to discuss further

## 2021-01-25 NOTE — ASSESSMENT & PLAN NOTE
The patient this morning had 3 episodes of bloody diarrhea  I spoke with the patient's daughter this morning did follow-up phone call now  There has been no further diarrhea  As I explained to the patient's daughter certainly diagnostic considerations could be C diff is the patient had recently been on Cipro although was not high dose to tree SBP the patient was on vancomycin prophylaxis this can also be GI bleeding secondary to gastric adenocarcinoma that is just worsening at this point  I recommended treating with q i d  oral vancomycin least for the next 24-48 hours and then stopping if there is no further diarrhea  The patient seems to be able to eat  He does have increased abdominal distension likely secondary to recurrent ascites  The paracentesis was rescheduled for tomorrow and he has a standing order for albumin to be given post paracentesis  Will recheck CBC and type and screen tomorrow is he will go for blood work tomorrow prior to the paracentesis and the labs should come back hopefully during that time and if he requires transfusion would see if it would be possible for us to transfuse during his time at the infusion center  Again this depends on results of repeat blood work

## 2021-01-25 NOTE — ASSESSMENT & PLAN NOTE
I spoke with the patient's daughter with again regarding goals of care  The aspect of the discussion talked about was recognizing that there is only so much do we were able to do for patient is an outpatient  We are certainly trying to think outside the box to do more we can do to help personalized this plan of care to do as much as we can is an outpatient  There has been discussion with regards to further consideration hospice care from the patient himself to his family  They wished to do all that would be possible in terms of minimizing if at all possible patient having to go into the inpatient setting  I think there is slow acceptance with the idea of hospice being more brought into the discussion  I asked the patient's daughter to speak with hospice in that would be possible to consider the idea of paracentesis to be part of the palliative care plan if needed to help with breathing  I stated that same is considered for patients with advanced heart failure that diuretics often part of the care plan to help the person with fluid management as part of again the palliative care plan even possibly transitioning to a hospice care I will do a follow-up phone call Wednesday further discuss the above  I will try and reach out to palliative care as well

## 2021-01-25 NOTE — PROGRESS NOTES
Virtual Brief Visit    Assessment/Plan:    Problem List Items Addressed This Visit        Cardiovascular and Mediastinum    Hypotension     The patient is on midodrine 10 mg t i d  Blood pressure today as per the patient's daughter was 100/70  This is higher than he normally runs 80-90  I asked her that for tomorrow on January 26th to change the times of when midodrine is given give the morning dose orally at 7:00 a m  and instead of the afternoon dose being at 10 mg increased to 15 in light of the paracentesis to be done prior to help minimize hypotension  Again the albumin would be given post procedure to minimize effects on hypotension  Relevant Orders    CBC and differential    Comprehensive metabolic panel       Other    Hyponatremia - Primary    Relevant Orders    CBC and differential    Comprehensive metabolic panel    Acute blood loss anemia     The patient this morning had 3 episodes of bloody diarrhea  I spoke with the patient's daughter this morning did follow-up phone call now  There has been no further diarrhea  As I explained to the patient's daughter certainly diagnostic considerations could be C diff is the patient had recently been on Cipro although was not high dose to tree SBP the patient was on vancomycin prophylaxis this can also be GI bleeding secondary to gastric adenocarcinoma that is just worsening at this point  I recommended treating with q i d  oral vancomycin least for the next 24-48 hours and then stopping if there is no further diarrhea  The patient seems to be able to eat  He does have increased abdominal distension likely secondary to recurrent ascites  The paracentesis was rescheduled for tomorrow and he has a standing order for albumin to be given post paracentesis    Will recheck CBC and type and screen tomorrow is he will go for blood work tomorrow prior to the paracentesis and the labs should come back hopefully during that time and if he requires transfusion would see if it would be possible for us to transfuse during his time at the infusion center  Again this depends on results of repeat blood work  Relevant Orders    CBC and differential    Comprehensive metabolic panel    Type and screen    Diarrhea, unspecified     Please see plan of care under acute blood-loss anemia         Other ascites     He has a high albumin gradient ascites with his serum albumin ascites gradient of 1 7  The total protein in the fluid is undetectable this is likely secondary effect of a cholangiocarcinoma and or hepatic metastasis affecting portal venous pressure is causing the formation of ascites  At this time planning for repeat paracentesis tomorrow  This may have to be done weekly if not even more frequently than that  Again I spoke with patient's daughter with regards to goals of care  Planning for paracentesis tomorrow with albumin hemodynamic support  Goals of care, counseling/discussion     I spoke with the patient's daughter with again regarding goals of care  The aspect of the discussion talked about was recognizing that there is only so much do we were able to do for patient is an outpatient  We are certainly trying to think outside the box to do more we can do to help personalized this plan of care to do as much as we can is an outpatient  There has been discussion with regards to further consideration hospice care from the patient himself to his family  They wished to do all that would be possible in terms of minimizing if at all possible patient having to go into the inpatient setting  I think there is slow acceptance with the idea of hospice being more brought into the discussion  I asked the patient's daughter to speak with hospice in that would be possible to consider the idea of paracentesis to be part of the palliative care plan if needed to help with breathing    I stated that same is considered for patients with advanced heart failure that diuretics often part of the care plan to help the person with fluid management as part of again the palliative care plan even possibly transitioning to a hospice care I will do a follow-up phone call Wednesday further discuss the above  I will try and reach out to palliative care as well  Other Visit Diagnoses     Diarrhea        Relevant Orders    CBC and differential    Comprehensive metabolic panel        I spoke this afternoon as well with Dr Joann Felix from palliative care  I spoke with regards to discussing possible options with regards to the patient's palliative care plan  With regards to recurrent ascites and option discussed was the idea of seeing if Interventional Radiology could place a Tenckhoff catheter to help drain the ascites which would help manage the fluid accumulation and help the patient's work of breathing  I will touch base with interventional radiology tomorrow to see if this would be feasible as well as than communicating with Dr Joann Felix regarding the feasibility of this could be placed and then further discussing with the patient's daughter he would be calling them and I would also be: Them as well  I very much appreciate his time in input today  For tomorrow to help with symptom management going for the paracentesis following up on labs and going from there  Reason for visit is No chief complaint on file         Encounter provider Robert Pena DO    Provider located at [address] [place] [address] 9 35 [facility]  [phone]    Recent Visits  Date Type Provider Dept   01/22/21 Telephone Robert Pena DO Medicine Physician   01/21/21 Telephone Robert Pena DO [facility] [place]   01/19/21 Al 328, DO [facility] recent visits within past 7 days and meeting all other requirements     Today's Visits  Date Type Provider Dept   01/25/21 Telemedicine Arsen Higginbotham Haylee Cordero DO [facility]   01/25/21 Telephone Lety Peterson RN [facility]   Showing today's visits and meeting all other requirements     Future Appointments  No visits were found meeting these conditions  Showing future appointments within next 150 days and meeting all other requirements        After connecting through telephone, the patient was identified by name and date of birth  Madie Fuller  was informed that this is a telemedicine visit and that the visit is being conducted through telephone  My office door was closed  No one else was in the room  He acknowledged consent and understanding of privacy and security of the platform  The patient has agreed to participate and understands he can discontinue the visit at any time  Patient is aware this is a billable service  Subjective    Madie Fuller  is a 61 y o  male     This morning the patient had 3 episodes diarrhea that was bloody  The patient just finished treatment for spontaneous bacterial peritonitis as well as given history of C diff was also placed oral vancomycin twice daily for C diff prophylaxis again the dose of the Cipro had been medium range dosing his I have worried about him tolerating high dose Cipro given its potential side effects as as worsening nausea given the patient's tenuous status  Follow-up phone call this afternoon the patient's diarrhea had stopped however he had a significant abdominal distension and paracentesis have been canceled today and rescheduled for tomorrow given the bloody diarrhea the patient was experiencing  Was able to keep a log for wound this morning  In speaking with the patient's daughter the goal in the family point of view goal is to try all that  we could do to keep the patient out of hospital is best as we could      HPI     Past Medical History:   Diagnosis Date    Cholangiocarcinoma ([facility] [address] )     Gastric carcinoma ([facility] [address] ) 06/2020    SHELLIE (obstructive sleep apnea)     Peptic ulcer     Zollinger-Waddell syndrome        Past Surgical History:   Procedure Laterality Date    APPENDECTOMY      CHOLECYSTECTOMY      COLONOSCOPY      IR PARACENTESIS  1/15/2021    LIVER RESECTION      STOMACH SURGERY      UPPER GASTROINTESTINAL ENDOSCOPY         Current Outpatient Medications   Medication Sig Dispense Refill    acamprosate (CAMPRAL) 333 mg tablet Take 666 mg by mouth 3 (three) times a day      albuterol (2 5 mg/3 mL) 0 083 % nebulizer solution Take 1 vial (2 5 mg total) by nebulization every 6 (six) hours as needed for wheezing or shortness of breath 90 vial 0    buPROPion (WELLBUTRIN SR) 150 mg 12 hr tablet Take 1 tablet (150 mg total) by mouth 3 (three) times a day 2 TABS IN AM ONE IN PM 90 tablet 3    clonazePAM (KlonoPIN) 0 5 mg tablet Take 1/2 tablet (0 25 mg) BID PRN for anxiety and 1 tablet (0 5 mg) QHS 60 tablet 0    fentaNYL (DURAGESIC) 100 mcg/hr TD 72 hr patch Place 1 patch on the skin every third dayMax Daily Amount: 1 patch 10 patch 0    fentaNYL (DURAGESIC) 50 mcg/hr Place 1 patch on the skin every third dayMax Daily Amount: 1 patch 10 patch 0    furosemide (LASIX) 40 mg tablet Take 1 tablet (40 mg total) by mouth daily (Patient taking differently: Take 40 mg by mouth as needed ) 30 tablet 2    gabapentin (NEURONTIN) 300 mg capsule Take 300 mg by mouth 2 (two) times a day      haloperidol (HALDOL) 0 5 mg tablet TAKE 1 TABLET BY MOUTH EVERY 6 HOURS AS NEEDED FOR NAUSEA AND VOMITTING 90 tablet 0    haloperidol (HALDOL) 1 mg/0 5 mL oral concentrated solution Take 0 25 mL (0 5 mg total) by mouth every 6 (six) hours as needed (nausea) 120 mL 0    hyoscyamine (LEVSIN) 0 125 MG/5ML ELIX Take 125 mcg by mouth every 4 (four) hours as needed for bladder spasms      lactulose 20 g/30 mL Take 15 mL (10 g total) by mouth daily 240 mL 1    levocetirizine (XYZAL) 5 MG tablet Take 10 mg by mouth daily as needed       midodrine (PROAMATINE) 10 MG tablet TAKE 1 TABLET (10 MG TOTAL) BY MOUTH 3 (THREE) TIMES A DAY BEFORE MEALS 90 tablet 0    milk thistle 175 MG tablet Take 175 mg by mouth 2 (two) times a day      montelukast (SINGULAIR) 10 mg tablet Take 10 mg by mouth daily at bedtime      naloxone (NARCAN) 4 mg/0 1 mL nasal spray Administer 1 spray into a nostril  If no response after 2-3 minutes, give another dose in the other nostril using a new spray  1 each 1    nystatin (MYCOSTATIN) 500,000 units/5 mL suspension Apply 5 mL (500,000 Units total) to the mouth or throat 2 (two) times a day 140 mL 0    ondansetron (ZOFRAN-ODT) 8 mg disintegrating tablet Take 1 tablet (8 mg total) by mouth every 8 (eight) hours as needed for nausea or vomiting 20 tablet 0    oxyCODONE (ROXICODONE) 20 MG TABS Take 1 tablet (20 mg total) by mouth every 4 (four) hours as needed for moderate pain (cancer-related pain)Max Daily Amount: 120 mg 180 tablet 0    pantoprazole (PROTONIX) 40 mg tablet Take 80 mg by mouth 2 (two) times a day       potassium chloride (K-DUR,KLOR-CON) 10 mEq tablet Take 1 tablet (10 mEq total) by mouth daily (Patient taking differently: Take 10 mEq by mouth as needed ) 30 tablet 5    rivaroxaban (XARELTO) 10 mg tablet Take 1 tablet (10 mg total) by mouth daily 90 tablet 0    sodium chloride 1 g tablet Take 0 5 tablets (0 5 g total) by mouth 2 (two) times a day Please crush in applesauce when giving hold tablets if weight more than 143 pounds 60 tablet 2    traZODone (DESYREL) 150 mg tablet Take 1 tablet (150 mg total) by mouth daily at bedtime 90 tablet 0    vancomycin (VANCOCIN) 125 MG capsule Take 1 capsule (125 mg total) by mouth 4 (four) times a day for 10 days 40 capsule 0     No current facility-administered medications for this visit  Allergies   Allergen Reactions    Amoxicillin-Pot Clavulanate GI Intolerance    Dilaudid [Hydromorphone Hcl] Other (See Comments)     Patient develops agitation and confusion      Hydromorphone      Severe hallucinations    Nsaids GI Intolerance       Review of Systems positive abdominal distention positive shortness of breath due to the distension positive nausea positive diarrhea as described above positive bloody diarrhea as described no chest pressure no dizziness no lightheadedness reported no leg swelling no hematemesis     There were no vitals filed for this visit  I spent 30 minutes directly with the patient during this visit    [address]  acknowledges that he has consented to an online visit or consultation  He understands that the online visit is based solely on information provided by him, and that, in the absence of a face-to-face physical evaluation by the physician, the diagnosis he receives is both limited and provisional in terms of accuracy and completeness  This is not intended to replace a full medical face-to-face evaluation by the physician  Silvestre Guillen  understands and accepts these terms

## 2021-01-25 NOTE — ASSESSMENT & PLAN NOTE
He has a high albumin gradient ascites with his serum albumin ascites gradient of 1 7  The total protein in the fluid is undetectable this is likely secondary effect of a cholangiocarcinoma and or hepatic metastasis affecting portal venous pressure is causing the formation of ascites  At this time planning for repeat paracentesis tomorrow  This may have to be done weekly if not even more frequently than that  Again I spoke with patient's daughter with regards to goals of care  Planning for paracentesis tomorrow with albumin hemodynamic support

## 2021-01-25 NOTE — ASSESSMENT & PLAN NOTE
The patient is on midodrine 10 mg t i d  Blood pressure today as per the patient's daughter was 100/70  This is higher than he normally runs 80-90  I asked her that for tomorrow on January 26th to change the times of when midodrine is given give the morning dose orally at 7:00 a m  and instead of the afternoon dose being at 10 mg increased to 15 in light of the paracentesis to be done prior to help minimize hypotension  Again the albumin would be given post procedure to minimize effects on hypotension

## 2021-01-26 ENCOUNTER — HOSPITAL ENCOUNTER (OUTPATIENT)
Dept: NON INVASIVE DIAGNOSTICS | Facility: HOSPITAL | Age: 61
Discharge: HOME | End: 2021-01-26
Attending: INTERNAL MEDICINE | Admitting: INTERNAL MEDICINE
Payer: MEDICARE

## 2021-01-26 ENCOUNTER — LAB (OUTPATIENT)
Dept: LAB | Facility: HOSPITAL | Age: 61
End: 2021-01-26
Attending: INTERNAL MEDICINE
Payer: MEDICARE

## 2021-01-26 ENCOUNTER — HOSPITAL ENCOUNTER (OUTPATIENT)
Dept: INFUSION CENTER | Facility: HOSPITAL | Age: 61
Discharge: HOME | End: 2021-01-26
Payer: MEDICARE

## 2021-01-26 ENCOUNTER — TRANSCRIBE ORDERS (OUTPATIENT)
Dept: ADMINISTRATIVE | Facility: HOSPITAL | Age: 61
End: 2021-01-26

## 2021-01-26 VITALS
RESPIRATION RATE: 18 BRPM | HEART RATE: 68 BPM | DIASTOLIC BLOOD PRESSURE: 66 MMHG | OXYGEN SATURATION: 96 % | SYSTOLIC BLOOD PRESSURE: 102 MMHG

## 2021-01-26 DIAGNOSIS — R18.8 OTHER ASCITES: ICD-10-CM

## 2021-01-26 DIAGNOSIS — R19.7 DIARRHEA: ICD-10-CM

## 2021-01-26 DIAGNOSIS — D62 ACUTE BLOOD LOSS ANEMIA: ICD-10-CM

## 2021-01-26 DIAGNOSIS — C22.1 CHOLANGIOCARCINOMA (HCC): ICD-10-CM

## 2021-01-26 DIAGNOSIS — I95.0 IDIOPATHIC HYPOTENSION: ICD-10-CM

## 2021-01-26 DIAGNOSIS — E87.1 HYPONATREMIA: ICD-10-CM

## 2021-01-26 LAB
ABO GROUP BLD: NORMAL
ALBUMIN SERPL BCP-MCNC: 2.3 G/DL (ref 3.5–5)
ALP SERPL-CCNC: 767 U/L (ref 46–116)
ALT SERPL W P-5'-P-CCNC: 44 U/L (ref 12–78)
ANION GAP SERPL CALCULATED.3IONS-SCNC: 7 MMOL/L (ref 4–13)
AST SERPL W P-5'-P-CCNC: 57 U/L (ref 5–45)
BASOPHILS # BLD AUTO: 0.03 THOUSANDS/ΜL (ref 0–0.1)
BASOPHILS NFR BLD AUTO: 0 % (ref 0–1)
BILIRUB SERPL-MCNC: 0.5 MG/DL (ref 0.2–1)
BLD GP AB SCN SERPL QL: NEGATIVE
BUN SERPL-MCNC: 29 MG/DL (ref 5–25)
CALCIUM ALBUM COR SERPL-MCNC: 11.4 MG/DL (ref 8.3–10.1)
CALCIUM SERPL-MCNC: 10 MG/DL (ref 8.3–10.1)
CHLORIDE SERPL-SCNC: 97 MMOL/L (ref 100–108)
CO2 SERPL-SCNC: 25 MMOL/L (ref 21–32)
CREAT SERPL-MCNC: 1.66 MG/DL (ref 0.6–1.3)
EOSINOPHIL # BLD AUTO: 0.03 THOUSAND/ΜL (ref 0–0.61)
EOSINOPHIL NFR BLD AUTO: 0 % (ref 0–6)
ERYTHROCYTE [DISTWIDTH] IN BLOOD BY AUTOMATED COUNT: 17.7 % (ref 11.6–15.1)
GFR SERPL CREATININE-BSD FRML MDRD: 44 ML/MIN/1.73SQ M
GLUCOSE SERPL-MCNC: 108 MG/DL (ref 65–140)
HCT VFR BLD AUTO: 37.7 % (ref 36.5–49.3)
HGB BLD-MCNC: 10.4 G/DL (ref 12–17)
IMM GRANULOCYTES # BLD AUTO: 0.17 THOUSAND/UL (ref 0–0.2)
IMM GRANULOCYTES NFR BLD AUTO: 2 % (ref 0–2)
LYMPHOCYTES # BLD AUTO: 0.83 THOUSANDS/ΜL (ref 0.6–4.47)
LYMPHOCYTES NFR BLD AUTO: 7 % (ref 14–44)
MAGNESIUM SERPL-MCNC: 2.2 MG/DL (ref 1.6–2.6)
MCH RBC QN AUTO: 28 PG (ref 26.8–34.3)
MCHC RBC AUTO-ENTMCNC: 27.6 G/DL (ref 31.4–37.4)
MCV RBC AUTO: 101 FL (ref 82–98)
MONOCYTES # BLD AUTO: 1.03 THOUSAND/ΜL (ref 0.17–1.22)
MONOCYTES NFR BLD AUTO: 9 % (ref 4–12)
NEUTROPHILS # BLD AUTO: 9.33 THOUSANDS/ΜL (ref 1.85–7.62)
NEUTS SEG NFR BLD AUTO: 82 % (ref 43–75)
NRBC BLD AUTO-RTO: 0 /100 WBCS
PLATELET # BLD AUTO: 363 THOUSANDS/UL (ref 149–390)
PMV BLD AUTO: 9.7 FL (ref 8.9–12.7)
POTASSIUM SERPL-SCNC: 4.5 MMOL/L (ref 3.5–5.3)
PROT SERPL-MCNC: 6.4 G/DL (ref 6.4–8.2)
RBC # BLD AUTO: 3.72 MILLION/UL (ref 3.88–5.62)
RH BLD: POSITIVE
SODIUM SERPL-SCNC: 129 MMOL/L (ref 136–145)
SPECIMEN EXPIRATION DATE: NORMAL
WBC # BLD AUTO: 11.42 THOUSAND/UL (ref 4.31–10.16)

## 2021-01-26 PROCEDURE — 83735 ASSAY OF MAGNESIUM: CPT

## 2021-01-26 PROCEDURE — 80053 COMPREHEN METABOLIC PANEL: CPT

## 2021-01-26 PROCEDURE — 49083 ABD PARACENTESIS W/IMAGING: CPT | Performed by: RADIOLOGY

## 2021-01-26 PROCEDURE — 36415 COLL VENOUS BLD VENIPUNCTURE: CPT

## 2021-01-26 PROCEDURE — 87493 C DIFF AMPLIFIED PROBE: CPT

## 2021-01-26 PROCEDURE — 86901 BLOOD TYPING SEROLOGIC RH(D): CPT

## 2021-01-26 PROCEDURE — 86900 BLOOD TYPING SEROLOGIC ABO: CPT

## 2021-01-26 PROCEDURE — 49083 ABD PARACENTESIS W/IMAGING: CPT

## 2021-01-26 PROCEDURE — 85025 COMPLETE CBC W/AUTO DIFF WBC: CPT

## 2021-01-26 PROCEDURE — 86850 RBC ANTIBODY SCREEN: CPT

## 2021-01-26 RX ORDER — ALBUMIN (HUMAN) 12.5 G/50ML
25 SOLUTION INTRAVENOUS ONCE
Status: COMPLETED | OUTPATIENT
Start: 2021-01-26 | End: 2021-01-26

## 2021-01-26 RX ORDER — LIDOCAINE WITH 8.4% SOD BICARB 0.9%(10ML)
SYRINGE (ML) INJECTION CODE/TRAUMA/SEDATION MEDICATION
Status: COMPLETED | OUTPATIENT
Start: 2021-01-26 | End: 2021-01-26

## 2021-01-26 RX ADMIN — ALBUMIN (HUMAN) 25 G: 0.25 INJECTION, SOLUTION INTRAVENOUS at 14:43

## 2021-01-26 RX ADMIN — Medication 18 ML: at 13:55

## 2021-01-26 RX ADMIN — ALBUMIN (HUMAN) 25 G: 0.25 INJECTION, SOLUTION INTRAVENOUS at 15:13

## 2021-01-26 NOTE — PLAN OF CARE
Problem: Potential for Falls  Goal: Patient will remain free of falls  Description: INTERVENTIONS:  - Assess patient frequently for physical needs  -  Identify cognitive and physical deficits and behaviors that affect risk of falls    -  [place] fall precautions as indicated by assessment   - Educate patient/family on patient safety including physical limitations  - Instruct patient to call for assistance with activity based on assessment  - Modify environment to reduce risk of injury  Outcome: Progressing

## 2021-01-26 NOTE — SEDATION DOCUMENTATION
Procedure ended, pt tolerated without incident  7 liters cloudy yellow fluid removed  Band aid to site  Pt to infusion center for albumin replacement  Discharge instructions given to daughter, next para scheduled Wed 2/3 at 56

## 2021-01-27 ENCOUNTER — TELEPHONE (OUTPATIENT)
Dept: PALLIATIVE MEDICINE | Facility: CLINIC | Age: 61
End: 2021-01-27

## 2021-01-27 ENCOUNTER — TELEPHONE (OUTPATIENT)
Dept: OTHER | Facility: HOSPITAL | Age: 61
End: 2021-01-27

## 2021-01-27 LAB — C DIFF TOX B TCDB STL QL NAA+PROBE: NEGATIVE

## 2021-01-27 NOTE — TELEPHONE ENCOUNTER
I attempted to call the patient's daughter Salomon Grant  I had been in communication with both Dr Jarek Aguilar from palliative care as well as Dr Alberts from Interventional Radiology  I think if it would be possible to have a Tenckhoff catheter placed to help with the management of the ascites  I think were truly at the point need to consider transition to home hospice care  I have been doing all that I can as safely as I can to help honor the patient's wishes as well as that of his daughter not to go into the hospital   I also wanted to review the labs of the patient with the patient's daughter  I will try and call back tomorrow

## 2021-01-27 NOTE — TELEPHONE ENCOUNTER
Spoke with patient's daughter via telephone regarding advanced care planning  Recent paracentesis 01/26 with 7L aspirated  Addressed potential for Tenckhoff catheter placement to manage comfort given rapidly accumulating ascites  Scheduled paracentesis for 02/03 [next Wednesday]; at that time, pending paracentesis, will plan to schedule Tenckhoff catheter placement for next treatment  Reports increase fentanyl patch to 200 mcg/h, previously unable to assess efficacy in the setting of ascites, states will visit patient today to assess pain level  Answered questions regarding hospice  Concern with changing current model of care, patient comfortable with current plan of care at this time  Discussed that hospice team routinely manages Tenckhoff catheters for comfort  Cielo Campbell reiterated appreciation for Dr Lieutenant Dolan management given clearly stated goal to not return to the hospital  Cielo meg also shares her fear is that if hospitalized, her father may die alone  Plan is to discussTenckhoff catheter and hospice model of care with father today  Will continue to follow and support      Kell Robert MD  Fellow, Palliative and Supportive Care

## 2021-01-29 ENCOUNTER — TELEMEDICINE (OUTPATIENT)
Dept: INTERNAL MEDICINE CLINIC | Facility: CLINIC | Age: 61
End: 2021-01-29
Payer: MEDICARE

## 2021-01-29 DIAGNOSIS — Z86.711 HISTORY OF PULMONARY EMBOLISM: Chronic | ICD-10-CM

## 2021-01-29 DIAGNOSIS — Z11.9 ENCOUNTER FOR SCREENING FOR INFECTIOUS AND PARASITIC DISEASES, UNSPECIFIED: ICD-10-CM

## 2021-01-29 DIAGNOSIS — R18.8 OTHER ASCITES: Primary | ICD-10-CM

## 2021-01-29 DIAGNOSIS — I95.0 IDIOPATHIC HYPOTENSION: ICD-10-CM

## 2021-01-29 DIAGNOSIS — B95.2 ENTEROCOCCUS FAECALIS INFECTION: ICD-10-CM

## 2021-01-29 DIAGNOSIS — R78.81 GRAM-POSITIVE BACTEREMIA: ICD-10-CM

## 2021-01-29 DIAGNOSIS — Z71.89 GOALS OF CARE, COUNSELING/DISCUSSION: ICD-10-CM

## 2021-01-29 PROCEDURE — 99442 PR PHYS/QHP TELEPHONE EVALUATION 11-20 MIN: CPT | Performed by: INTERNAL MEDICINE

## 2021-01-29 RX ORDER — MIDODRINE HYDROCHLORIDE 5 MG/1
5 TABLET ORAL 3 TIMES DAILY
Qty: 90 TABLET | Refills: 1 | Status: SHIPPED | OUTPATIENT
Start: 2021-01-29

## 2021-01-29 RX ORDER — MIDODRINE HYDROCHLORIDE 10 MG/1
10 TABLET ORAL
Qty: 90 TABLET | Refills: 1 | Status: SHIPPED | OUTPATIENT
Start: 2021-01-29 | End: 2021-02-28

## 2021-01-29 NOTE — ASSESSMENT & PLAN NOTE
The patient is wishing that wishes to be comfortable in his home environment  One of obstacles to completely committing to the idea hospice care from patient's perspective is that of having to transition to new physicians  Patient and daughter very much like working relationship with myself and Dr Farzana Nielsen  I AS the daughter to reach out to Dr Fazrana Nielsen see what would be possible with regards to home hospice in terms formulating a plan that would as best as possible try to actively address issues of concern with patient terms of things such as management and other active issues  I stated I would try to reach out with Dr Farzana Nielsen as well  I did speak with Dr Farzana Nielsen earlier in week and appreciate his help in terms of discussing Tenckhoff catheter and management of ascites within parameters patient was talking about with the goal slowly transitioning to comfort  based care

## 2021-01-29 NOTE — ASSESSMENT & PLAN NOTE
Will increase the midodrine to 15 mg in the morning and 12 5 mg in the early afternoon   And in the evening  He seems to be the most active in the morning and probably requires the most hemodynamics support with the increased dose of midodrine in the morning I spoke the patient's daughter  I do not know I would go higher than this does with regards to this medication

## 2021-01-29 NOTE — ASSESSMENT & PLAN NOTE
His ascites is rapidly accumulating  He is due to go for a paracentesis on Wednesday however after discussion that I had with interventional Radiology earlier this week will also place consult for placement of a Tenckhoff catheter to help with management of ascites on an outpatient basis  I will need to touch base with the patient's daughter with regards to how much fluid need to removed on a daily basis to help patient comfort wise

## 2021-01-29 NOTE — PROGRESS NOTES
Virtual Brief Visit    Assessment/Plan:    Problem List Items Addressed This Visit        Cardiovascular and Mediastinum    Hypotension      Will increase the midodrine to 15 mg in the morning and 12 5 mg in the early afternoon   And in the evening  He seems to be the most active in the morning and probably requires the most hemodynamics support with the increased dose of midodrine in the morning I spoke the patient's daughter  I do not know I would go higher than this does with regards to this medication  Relevant Medications    midodrine (PROAMATINE) 5 mg tablet    Other Relevant Orders    Comprehensive metabolic panel    CBC and differential       Other    Enterococcus faecalis bacteremia    Relevant Medications    midodrine (PROAMATINE) 10 MG tablet    Other ascites - Primary       His ascites is rapidly accumulating  He is due to go for a paracentesis on Wednesday however after discussion that I had with interventional Radiology earlier this week will also place consult for placement of a Tenckhoff catheter to help with management of ascites on an outpatient basis  I will need to touch base with the patient's daughter with regards to how much fluid need to removed on a daily basis to help patient comfort wise  Relevant Orders    Ambulatory referral to Interventional Radiology    Comprehensive metabolic panel    CBC and differential    Goals of care, counseling/discussion       The patient is wishing that wishes to be comfortable in his home environment  One of obstacles to completely committing to the idea hospice care from patient's perspective is that of having to transition to new physicians  Patient and daughter very much like working relationship with myself and Dr Ned Harding    I AS the daughter to reach out to Dr Ned Harding see what would be possible with regards to home hospice in terms formulating a plan that would as best as possible try to actively address issues of concern with patient terms of things such as management and other active issues  I stated I would try to reach out with Dr Carlos Khan as well  I did speak with Dr Carlos Khan earlier in week and appreciate his help in terms of discussing Tenckhoff catheter and management of ascites within parameters patient was talking about with the goal slowly transitioning to comfort  based care  Other Visit Diagnoses     Encounter for screening for infectious and parasitic diseases, unspecified        Relevant Orders    Novel Coronavirus (Covid-19),PCR [facility] - Collected at [facility] or [facility]    Gram-positive bacteremia        Relevant Medications    midodrine (PROAMATINE) 10 MG tablet                Reason for visit is No chief complaint on file  Encounter provider Sammie Braswell DO    Provider located at [facility] [place] [place] [address] [place] [address]  [phone]    Recent Visits  Date Type Provider Dept   01/27/21 Telephone Sammie Braswell DO Medicine Physician   01/25/21 Al Ang, DO [facility]   01/25/21 Telephone Orland Osgood, RN [facility]   01/22/21 Telephone Sammie Braswell DO Medicine Physician   Showing recent visits within past 7 days and meeting all other requirements     Today's Visits  Date Type Provider Dept   01/29/21 Al Ang,  [facility]   Showing today's visits and meeting all other requirements     Future Appointments  No visits were found meeting these conditions  Showing future appointments within next 150 days and meeting all other requirements        After connecting through telephone, the patient was identified by name and date of birth  [place]  was informed that this is a telemedicine visit and that the visit is being conducted through telephone  My office door was closed  No one else was in the room    He acknowledged consent and understanding of privacy and security of the platform  The patient has agreed to participate and understands he can discontinue the visit at any time  Patient is aware this is a billable service  Subjective    Zachary Jenkins  is a 61 y o  male      I had spoken with palliative care with Dr Michelle House as well as with interventional radiology  Dr Amada Waller  Regarding how to help facilitate goals care with regards to ascites as he was rapidly accumulating  Talked about having a Tenckhoff catheter placed  Dr Murrieta Hence had spoken with the patient's daughter earlier this week and I had  Attempted to speak with her as well  I spoke with the patient's daughter now with regards to the above issues the patient  I reviewed recent lab or with regards the  Above      HPI     Past Medical History:   Diagnosis Date    [facility] [address] [facility]     Gastric carcinoma ([facility] [address] ) 06/2020    SHELLIE (obstructive sleep apnea)     Peptic ulcer     Zollinger-Waddell syndrome        Past Surgical History:   Procedure Laterality Date    APPENDECTOMY      CHOLECYSTECTOMY      COLONOSCOPY      IR PARACENTESIS  1/15/2021    IR PARACENTESIS  1/26/2021    LIVER RESECTION      STOMACH SURGERY      UPPER GASTROINTESTINAL ENDOSCOPY         Current Outpatient Medications   Medication Sig Dispense Refill    acamprosate (CAMPRAL) 333 mg tablet Take 666 mg by mouth 3 (three) times a day      albuterol (2 5 mg/3 mL) 0 083 % nebulizer solution Take 1 vial (2 5 mg total) by nebulization every 6 (six) hours as needed for wheezing or shortness of breath 90 vial 0    buPROPion (WELLBUTRIN SR) 150 mg 12 hr tablet Take 1 tablet (150 mg total) by mouth 3 (three) times a day 2 TABS IN AM ONE IN PM 90 tablet 3    clonazePAM (KlonoPIN) 0 5 mg tablet Take 1/2 tablet (0 25 mg) BID PRN for anxiety and 1 tablet (0 5 mg) QHS 60 tablet 0    fentaNYL (DURAGESIC) 100 mcg/hr TD 72 hr patch Place 1 patch on the skin every third dayMax Daily Amount: 1 patch 10 patch 0    fentaNYL (DURAGESIC) 50 mcg/hr Place 1 patch on the skin every third dayMax Daily Amount: 1 patch 10 patch 0    furosemide (LASIX) 40 mg tablet Take 1 tablet (40 mg total) by mouth daily (Patient taking differently: Take 40 mg by mouth as needed ) 30 tablet 2    gabapentin (NEURONTIN) 300 mg capsule Take 300 mg by mouth 2 (two) times a day      haloperidol (HALDOL) 0 5 mg tablet TAKE 1 TABLET BY MOUTH EVERY 6 HOURS AS NEEDED FOR NAUSEA AND VOMITTING 90 tablet 0    haloperidol (HALDOL) 1 mg/0 5 mL oral concentrated solution Take 0 25 mL (0 5 mg total) by mouth every 6 (six) hours as needed (nausea) 120 mL 0    hyoscyamine (LEVSIN) 0 125 MG/5ML ELIX Take 125 mcg by mouth every 4 (four) hours as needed for bladder spasms      lactulose 20 g/30 mL Take 15 mL (10 g total) by mouth daily 240 mL 1    levocetirizine (XYZAL) 5 MG tablet Take 10 mg by mouth daily as needed       midodrine (PROAMATINE) 10 MG tablet Take 1 tablet (10 mg total) by mouth 3 (three) times a day before meals 90 tablet 1    midodrine (PROAMATINE) 5 mg tablet Take 1 tablet (5 mg total) by mouth 3 (three) times a day Take 15 mg in the morning (with 10 mg dose) and 12 5 mg dose in afternoon and evening 90 tablet 1    milk thistle 175 MG tablet Take 175 mg by mouth 2 (two) times a day      montelukast (SINGULAIR) 10 mg tablet Take 10 mg by mouth daily at bedtime      naloxone (NARCAN) 4 mg/0 1 mL nasal spray Administer 1 spray into a nostril  If no response after 2-3 minutes, give another dose in the other nostril using a new spray   1 each 1    nystatin (MYCOSTATIN) 500,000 units/5 mL suspension Apply 5 mL (500,000 Units total) to the mouth or throat 2 (two) times a day 140 mL 0    ondansetron (ZOFRAN-ODT) 8 mg disintegrating tablet Take 1 tablet (8 mg total) by mouth every 8 (eight) hours as needed for nausea or vomiting 20 tablet 0    oxyCODONE (ROXICODONE) 20 MG TABS Take 1 tablet (20 mg total) by mouth every 4 (four) hours as needed for moderate pain (cancer-related pain)Max Daily Amount: 120 mg 180 tablet 0    pantoprazole (PROTONIX) 40 mg tablet Take 80 mg by mouth 2 (two) times a day       potassium chloride (K-DUR,KLOR-CON) 10 mEq tablet Take 1 tablet (10 mEq total) by mouth daily (Patient taking differently: Take 10 mEq by mouth as needed ) 30 tablet 5    rivaroxaban (XARELTO) 10 mg tablet Take 1 tablet (10 mg total) by mouth daily 90 tablet 0    sodium chloride 1 g tablet Take 0 5 tablets (0 5 g total) by mouth 2 (two) times a day Please crush in applesauce when giving hold tablets if weight more than 143 pounds 60 tablet 2    traZODone (DESYREL) 150 mg tablet Take 1 tablet (150 mg total) by mouth daily at bedtime 90 tablet 0    vancomycin (VANCOCIN) 125 MG capsule Take 1 capsule (125 mg total) by mouth 4 (four) times a day for 10 days 40 capsule 0     No current facility-administered medications for this visit  Allergies   Allergen Reactions    Amoxicillin-Pot Clavulanate GI Intolerance    Dilaudid [Hydromorphone Hcl] Other (See Comments)     Patient develops agitation and confusion   Hydromorphone      Severe hallucinations    Nsaids GI Intolerance       Review of Systems   Increased fatigue increased shortness of breath increased cough increased abdominal distension increased weakness dizziness lightheadedness  Chronic nausea although he has been able to tolerate soup as well as some boost supplements    There were no vitals filed for this visit  I spent 20 minutes directly with the patient during this visit    [address]  acknowledges that he has consented to an online visit or consultation  He understands that the online visit is based solely on information provided by him, and that, in the absence of a face-to-face physical evaluation by the physician, the diagnosis he receives is both limited and provisional in terms of accuracy and completeness   This is not intended to replace a full medical face-to-face evaluation by the physician  Ignacio La  understands and accepts these terms

## 2021-02-02 ENCOUNTER — TELEMEDICINE (OUTPATIENT)
Dept: INTERNAL MEDICINE CLINIC | Facility: CLINIC | Age: 61
End: 2021-02-02
Payer: MEDICARE

## 2021-02-02 ENCOUNTER — PREP FOR PROCEDURE (OUTPATIENT)
Dept: INTERVENTIONAL RADIOLOGY/VASCULAR | Facility: CLINIC | Age: 61
End: 2021-02-02

## 2021-02-02 ENCOUNTER — TELEMEDICINE (OUTPATIENT)
Dept: INTERVENTIONAL RADIOLOGY/VASCULAR | Facility: CLINIC | Age: 61
End: 2021-02-02
Payer: MEDICARE

## 2021-02-02 DIAGNOSIS — Z71.89 GOALS OF CARE, COUNSELING/DISCUSSION: ICD-10-CM

## 2021-02-02 DIAGNOSIS — C22.1 CHOLANGIOCARCINOMA (HCC): ICD-10-CM

## 2021-02-02 DIAGNOSIS — C16.2 MALIGNANT NEOPLASM OF BODY OF STOMACH (HCC): Primary | ICD-10-CM

## 2021-02-02 DIAGNOSIS — E43 SEVERE PROTEIN-CALORIE MALNUTRITION (HCC): ICD-10-CM

## 2021-02-02 DIAGNOSIS — F41.9 ANXIETY: ICD-10-CM

## 2021-02-02 DIAGNOSIS — I95.0 IDIOPATHIC HYPOTENSION: Primary | ICD-10-CM

## 2021-02-02 DIAGNOSIS — R18.0 MALIGNANT ASCITES: Primary | ICD-10-CM

## 2021-02-02 DIAGNOSIS — G89.3 CANCER RELATED PAIN: ICD-10-CM

## 2021-02-02 DIAGNOSIS — Z51.5 END OF LIFE CARE: ICD-10-CM

## 2021-02-02 DIAGNOSIS — E16.4 ZOLLINGER-ELLISON SYNDROME: ICD-10-CM

## 2021-02-02 PROCEDURE — 99442 PR PHYS/QHP TELEPHONE EVALUATION 11-20 MIN: CPT | Performed by: INTERNAL MEDICINE

## 2021-02-02 PROCEDURE — 99443 PR PHYS/QHP TELEPHONE EVALUATION 21-30 MIN: CPT | Performed by: RADIOLOGY

## 2021-02-02 RX ORDER — LORAZEPAM 2 MG/ML
0.5 CONCENTRATE ORAL
Qty: 30 ML | Refills: 0 | Status: SHIPPED | OUTPATIENT
Start: 2021-02-02 | End: 2021-02-12

## 2021-02-02 RX ORDER — OXYCODONE HCL 20 MG/ML
10 CONCENTRATE, ORAL ORAL EVERY 4 HOURS PRN
Qty: 30 ML | Refills: 0 | Status: SHIPPED | OUTPATIENT
Start: 2021-02-02 | End: 2021-02-04

## 2021-02-02 RX ORDER — OXYCODONE HCL 5 MG/5 ML
10 SOLUTION, ORAL ORAL
Qty: 150 ML | Refills: 0 | Status: SHIPPED | OUTPATIENT
Start: 2021-02-02 | End: 2021-02-04 | Stop reason: CLARIF

## 2021-02-02 RX ORDER — SODIUM CHLORIDE 9 MG/ML
75 INJECTION, SOLUTION INTRAVENOUS CONTINUOUS
Status: CANCELLED | OUTPATIENT
Start: 2021-02-03

## 2021-02-02 NOTE — ASSESSMENT & PLAN NOTE
I spoke with Dr Lesly France from IR and Dr Marylee Anchors from Palliative care  I think Mr Moo Schwarz is actively dying at this point  The patient is not stable to go to IR for Tenckhoff catheter placement  His blood pressure is in the 70s, at this point he has made it clear that he wishes to be kept at home and not go to the hospital  Changing goals of care to providing comfort care is the only option at this point

## 2021-02-02 NOTE — PROGRESS NOTES
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  Patient was scheduled for tenckhoff catheter tomorrow which is now cancelled  Patient became lethargic, confused and uncomfortable this weekend with a low blood pressure  Family cannot move the patient out of the home  He also has difficulty breathing  Palliative care will help daughter perform home hospice with no further procedures or hospital or clinic visits  This plan was discussed with the daughter, Dr Yi Martinez and Dr Caryl Luz  Problem List Items Addressed This Visit     None                Reason for visit is   Chief Complaint   Patient presents with    Virtual Brief Visit        Encounter provider Juna Gregorio MD    Provider located at [address] [place] [address] [address]  [facility] [id number]  [phone]    Recent Visits  Date Type Provider Dept   01/29/21 [facility] DO [facility]   01/27/21 Telephone Amanda Queen DO Medicine Physician   Showing recent visits within past 7 days and meeting all other requirements     Today's Visits  Date Type Provider Dept   02/02/21 Telemedicine Juan Gregorio MD [facility] today's visits and meeting all other requirements     Future Appointments  No visits were found meeting these conditions  Showing future appointments within next 150 days and meeting all other requirements        After connecting through telephone, the patient was identified by name and date of birth  Yasmeen Sweeney  was informed that this is a telemedicine visit and that the visit is being conducted through telephone  My office door was closed  No one else was in the room  He acknowledged consent and understanding of privacy and security of the platform   The patient has agreed to participate and understands he can discontinue the visit at any time     Patient is aware this is a billable service  Subjective    Blanca Saleh  is a 61 y o  male now on hospice for malignant ascites and end stage cancer    HPI     Past Medical History:   Diagnosis Date    [facility] [facility]     [facility] [address] [facility] 06/2020    SHELLIE (obstructive sleep apnea)     Peptic ulcer     Zollinger-Waddell syndrome        Past Surgical History:   Procedure Laterality Date    APPENDECTOMY      CHOLECYSTECTOMY      COLONOSCOPY      IR PARACENTESIS  1/15/2021    IR PARACENTESIS  1/26/2021    LIVER RESECTION      STOMACH SURGERY      UPPER GASTROINTESTINAL ENDOSCOPY         Current Outpatient Medications   Medication Sig Dispense Refill    acamprosate (CAMPRAL) 333 mg tablet Take 666 mg by mouth 3 (three) times a day      albuterol (2 5 mg/3 mL) 0 083 % nebulizer solution Take 1 vial (2 5 mg total) by nebulization every 6 (six) hours as needed for wheezing or shortness of breath 90 vial 0    buPROPion (WELLBUTRIN SR) 150 mg 12 hr tablet Take 1 tablet (150 mg total) by mouth 3 (three) times a day 2 TABS IN AM ONE IN PM 90 tablet 3    clonazePAM (KlonoPIN) 0 5 mg tablet Take 1/2 tablet (0 25 mg) BID PRN for anxiety and 1 tablet (0 5 mg) QHS 60 tablet 0    fentaNYL (DURAGESIC) 100 mcg/hr TD 72 hr patch Place 1 patch on the skin every third dayMax Daily Amount: 1 patch 10 patch 0    fentaNYL (DURAGESIC) 50 mcg/hr Place 1 patch on the skin every third dayMax Daily Amount: 1 patch 10 patch 0    furosemide (LASIX) 40 mg tablet Take 1 tablet (40 mg total) by mouth daily (Patient taking differently: Take 40 mg by mouth as needed ) 30 tablet 2    gabapentin (NEURONTIN) 300 mg capsule Take 300 mg by mouth 2 (two) times a day      haloperidol (HALDOL) 0 5 mg tablet TAKE 1 TABLET BY MOUTH EVERY 6 HOURS AS NEEDED FOR NAUSEA AND VOMITTING 90 tablet 0    hyoscyamine (LEVSIN) 0 125 MG/5ML ELIX Take 125 mcg by mouth every 4 (four) hours as needed for bladder spasms      lactulose 20 g/30 mL Take 15 mL (10 g total) by mouth daily 240 mL 1    levocetirizine (XYZAL) 5 MG tablet Take 10 mg by mouth daily as needed       midodrine (PROAMATINE) 10 MG tablet Take 1 tablet (10 mg total) by mouth 3 (three) times a day before meals 90 tablet 1    midodrine (PROAMATINE) 5 mg tablet Take 1 tablet (5 mg total) by mouth 3 (three) times a day Take 15 mg in the morning (with 10 mg dose) and 12 5 mg dose in afternoon and evening 90 tablet 1    milk thistle 175 MG tablet Take 175 mg by mouth 2 (two) times a day      montelukast (SINGULAIR) 10 mg tablet Take 10 mg by mouth daily at bedtime      naloxone (NARCAN) 4 mg/0 1 mL nasal spray Administer 1 spray into a nostril  If no response after 2-3 minutes, give another dose in the other nostril using a new spray  1 each 1    nystatin (MYCOSTATIN) 500,000 units/5 mL suspension Apply 5 mL (500,000 Units total) to the mouth or throat 2 (two) times a day 140 mL 0    ondansetron (ZOFRAN-ODT) 8 mg disintegrating tablet Take 1 tablet (8 mg total) by mouth every 8 (eight) hours as needed for nausea or vomiting 20 tablet 0    pantoprazole (PROTONIX) 40 mg tablet Take 80 mg by mouth 2 (two) times a day       potassium chloride (K-DUR,KLOR-CON) 10 mEq tablet Take 1 tablet (10 mEq total) by mouth daily (Patient taking differently: Take 10 mEq by mouth as needed ) 30 tablet 5    rivaroxaban (XARELTO) 10 mg tablet Take 1 tablet (10 mg total) by mouth daily 90 tablet 0    sodium chloride 1 g tablet Take 0 5 tablets (0 5 g total) by mouth 2 (two) times a day Please crush in applesauce when giving hold tablets if weight more than 143 pounds 60 tablet 2    traZODone (DESYREL) 150 mg tablet Take 1 tablet (150 mg total) by mouth daily at bedtime 90 tablet 0    vancomycin (VANCOCIN) 125 MG capsule Take 1 capsule (125 mg total) by mouth 4 (four) times a day for 10 days 40 capsule 0     No current facility-administered medications for this visit  Allergies   Allergen Reactions    Amoxicillin-Pot Clavulanate GI Intolerance    Dilaudid [Hydromorphone Hcl] Other (See Comments)     Patient develops agitation and confusion   Hydromorphone      Severe hallucinations    Nsaids GI Intolerance       Review of Systems    There were no vitals filed for this visit  I spent 45 minutes with patient today in which greater than 50% of the time was spent in counseling/coordination of care regarding his malignant ascites and metastatic disease and hospice care  It was my intent to perform this visit via video technology but the patient was not able to do a video connection so the visit was completed via audio telephone only  VIRTUAL VISIT DISCLAIMER    Yasmeen Sweeney  acknowledges that he has consented to an online visit or consultation  He understands that the online visit is based solely on information provided by him, and that, in the absence of a face-to-face physical evaluation by the physician, the diagnosis he receives is both limited and provisional in terms of accuracy and completeness  This is not intended to replace a full medical face-to-face evaluation by the physician  Yasmeen Sweeney  understands and accepts these terms

## 2021-02-02 NOTE — LETTER
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  Patient was scheduled for tenckhoff catheter tomorrow which is now cancelled  Patient became lethargic, confused and uncomfortable this weekend with a low blood pressure  Family cannot move the patient out of the home  He also has difficulty breathing  Palliative care will help daughter perform home hospice with no further procedures or hospital or clinic visits  This plan was discussed with the daughter, Dr Yi Martinez and Dr Caryl Luz    Problem List Items Addressed This Visit     None                Reason for visit is   Chief Complaint   Patient presents with    Virtual Brief Visit        Encounter provider Juan Gregorio MD    Provider located at [address] [place] [address] [id number]  [phone]    Recent Visits  Date Type Provider Dept   [facility]   01/27/21 Telephone Amanda Queen DO Medicine Physician   Showing recent visits within past 7 days and meeting all other requirements     Today's Visits  Date Type Provider Dept   02/02/21 Telemedicine Juan Gregorio MD [address] [place] [address] today's visits and meeting all other requirements     Future Appointments  No visits were found meeting these conditions  Showing future appointments within next 150 days and meeting all other requirements        After connecting through telephone, the patient was identified by name and date of birth  Cathy Epps  was informed that this is a telemedicine visit and that the visit is being conducted through telephone  My office door was closed  No one else was in the room  He acknowledged consent and understanding of privacy and security of the platform  The patient has agreed to participate and understands he can discontinue the visit at any time  Patient is aware this is a billable service  Subjective    Cathy Epps  is a 61 y o  male now on hospice for malignant ascites and end stage cancer    HPI     Past Medical History:   Diagnosis Date    [facility] [address] [facility]     Gastric carcinoma ([facility] [address] ) 06/2020    SHELLIE (obstructive sleep apnea)     Peptic ulcer     Zollinger-Waddell syndrome        Past Surgical History:   Procedure Laterality Date    APPENDECTOMY      CHOLECYSTECTOMY      COLONOSCOPY      IR PARACENTESIS  1/15/2021    IR PARACENTESIS  1/26/2021    LIVER RESECTION      STOMACH SURGERY      UPPER GASTROINTESTINAL ENDOSCOPY         Current Outpatient Medications   Medication Sig Dispense Refill    acamprosate (CAMPRAL) 333 mg tablet Take 666 mg by mouth 3 (three) times a day      albuterol (2 5 mg/3 mL) 0 083 % nebulizer solution Take 1 vial (2 5 mg total) by nebulization every 6 (six) hours as needed for wheezing or shortness of breath 90 vial 0    buPROPion (WELLBUTRIN SR) 150 mg 12 hr tablet Take 1 tablet (150 mg total) by mouth 3 (three) times a day 2 TABS IN AM ONE IN PM 90 tablet 3    clonazePAM (KlonoPIN) 0 5 mg tablet Take 1/2 tablet (0 25 mg) BID PRN for anxiety and 1 tablet (0 5 mg) QHS 60 tablet 0    fentaNYL (DURAGESIC) 100 mcg/hr TD 72 hr patch Place 1 patch on the skin every third dayMax Daily Amount: 1 patch 10 patch 0    fentaNYL (DURAGESIC) 50 mcg/hr Place 1 patch on the skin every third dayMax Daily Amount: 1 patch 10 patch 0    furosemide (LASIX) 40 mg tablet Take 1 tablet (40 mg total) by mouth daily (Patient taking differently: Take 40 mg by mouth as needed ) 30 tablet 2    gabapentin (NEURONTIN) 300 mg capsule Take 300 mg by mouth 2 (two) times a day      haloperidol (HALDOL) 0 5 mg tablet TAKE 1 TABLET BY MOUTH EVERY 6 HOURS AS NEEDED FOR NAUSEA AND VOMITTING 90 tablet 0    hyoscyamine (LEVSIN) 0 125 MG/5ML ELIX Take 125 mcg by mouth every 4 (four) hours as needed for bladder spasms      lactulose 20 g/30 mL Take 15 mL (10 g total) by mouth daily 240 mL 1    levocetirizine (XYZAL) 5 MG tablet Take 10 mg by mouth daily as needed       midodrine (PROAMATINE) 10 MG tablet Take 1 tablet (10 mg total) by mouth 3 (three) times a day before meals 90 tablet 1    midodrine (PROAMATINE) 5 mg tablet Take 1 tablet (5 mg total) by mouth 3 (three) times a day Take 15 mg in the morning (with 10 mg dose) and 12 5 mg dose in afternoon and evening 90 tablet 1    milk thistle 175 MG tablet Take 175 mg by mouth 2 (two) times a day      montelukast (SINGULAIR) 10 mg tablet Take 10 mg by mouth daily at bedtime      naloxone (NARCAN) 4 mg/0 1 mL nasal spray Administer 1 spray into a nostril  If no response after 2-3 minutes, give another dose in the other nostril using a new spray   1 each 1    nystatin (MYCOSTATIN) 500,000 units/5 mL suspension Apply 5 mL (500,000 Units total) to the mouth or throat 2 (two) times a day 140 mL 0    ondansetron (ZOFRAN-ODT) 8 mg disintegrating tablet Take 1 tablet (8 mg total) by mouth every 8 (eight) hours as needed for nausea or vomiting 20 tablet 0    pantoprazole (PROTONIX) 40 mg tablet Take 80 mg by mouth 2 (two) times a day       potassium chloride (K-DUR,KLOR-CON) 10 mEq tablet Take 1 tablet (10 mEq total) by mouth daily (Patient taking differently: Take 10 mEq by mouth as needed ) 30 tablet 5    rivaroxaban (XARELTO) 10 mg tablet Take 1 tablet (10 mg total) by mouth daily 90 tablet 0    sodium chloride 1 g tablet Take 0 5 tablets (0 5 g total) by mouth 2 (two) times a day Please crush in applesauce when giving hold tablets if weight more than 143 pounds 60 tablet 2    traZODone (DESYREL) 150 mg tablet Take 1 tablet (150 mg total) by mouth daily at bedtime 90 tablet 0    vancomycin (VANCOCIN) 125 MG capsule Take 1 capsule (125 mg total) by mouth 4 (four) times a day for 10 days 40 capsule 0     No current facility-administered medications for this visit  Allergies   Allergen Reactions    Amoxicillin-Pot Clavulanate GI Intolerance    Dilaudid [Hydromorphone Hcl] Other (See Comments)     Patient develops agitation and confusion   Hydromorphone      Severe hallucinations    Nsaids GI Intolerance       Review of Systems    There were no vitals filed for this visit  I spent 45 minutes with patient today in which greater than 50% of the time was spent in counseling/coordination of care regarding his malignant ascites and metastatic disease and hospice care  It was my intent to perform this visit via video technology but the patient was not able to do a video connection so the visit was completed via audio telephone only  VIRTUAL VISIT DISCLAIMER    Mahendra Rabago  acknowledges that he has consented to an online visit or consultation  He understands that the online visit is based solely on information provided by him, and that, in the absence of a face-to-face physical evaluation by the physician, the diagnosis he receives is both limited and provisional in terms of accuracy and completeness  This is not intended to replace a full medical face-to-face evaluation by the physician  Mahendra Rabago  understands and accepts these terms
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
## 2021-02-02 NOTE — PROGRESS NOTES
Discussed with Dr Ramos Lewis and Dr Jackie Lara via telephone regarding patient's decline with concern for active dying process  Spoke with patient's daughter Alessandra Babcock via telephone regarding patient's current end-of-life care  Patient increasingly more confused with increasing pain and discomfort  Persistently low BP  Discussed with pharmacist at [address] 2 mg/2mL in stock; oxycodone 5 mg/5 mL in stock [will order oxy 20 mg/mL concentration for tomorrow]  Spoke with hospice Liaison with next available open for 02/04-02/05/2021  Family unable to transport patient from home safely given current state  Palliative Care to manage patient's active end-of-life care      Recommendations:   - start oxy-IR 10 mg PO Q1H PRN for pain/SOB   - Rx sent for 150 mL of 5 mg/5mL solution [rest in stock per pharmacy]   - 2nd Rx for 30 mL of 20 mg/mL solution [ordered for tomorrow]   - discontinue oxy-IR 20 mg PO Q4H PRN   - start lorazepam 0 5 mg PO Q1H PRN for anxiety/SOB   - Rx sent for 30 mL   - increase haloperidol 1 mg PO Q1H PRN for agitation/restlessness/nausea/vomiting/hiccups/hallucinations   - will continue to monitor symptoms    Tawny Steinberg MD  Fellow, Palliative and Supportive Care
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
## 2021-02-03 ENCOUNTER — HOSPITAL ENCOUNTER (OUTPATIENT)
Dept: INFUSION CENTER | Facility: HOSPITAL | Age: 61
Discharge: HOME | End: 2021-02-03

## 2021-02-04 ENCOUNTER — TELEPHONE (OUTPATIENT)
Dept: OTHER | Facility: OTHER | Age: 61
End: 2021-02-04

## 2021-02-04 ENCOUNTER — TELEPHONE (OUTPATIENT)
Dept: PALLIATIVE MEDICINE | Facility: CLINIC | Age: 61
End: 2021-02-04

## 2021-02-04 DIAGNOSIS — G89.3 CANCER RELATED PAIN: ICD-10-CM

## 2021-02-04 DIAGNOSIS — C22.1 CHOLANGIOCARCINOMA (HCC): ICD-10-CM

## 2021-02-04 DIAGNOSIS — E43 SEVERE PROTEIN-CALORIE MALNUTRITION (HCC): ICD-10-CM

## 2021-02-04 DIAGNOSIS — Z51.5 END OF LIFE CARE: ICD-10-CM

## 2021-02-04 DIAGNOSIS — C16.2 MALIGNANT NEOPLASM OF BODY OF STOMACH (HCC): ICD-10-CM

## 2021-02-04 RX ORDER — OXYCODONE HCL 20 MG/ML
10 CONCENTRATE, ORAL ORAL
Qty: 30 ML | Refills: 0
Start: 2021-02-04 | End: 2021-02-05 | Stop reason: ALTCHOICE

## 2021-02-04 NOTE — TELEPHONE ENCOUNTER
Called patient's daughter to check-in on patient and current symptoms management  Per daughter, patient with continued decline, less interactive with intermittent episodes of gurgling respirations; improved with position adjustment  Also requiring Q3H dosing of lorazepam/haloperidol/oxycodone for symptoms management [respirations + pain + anxiety]; currently adequately controlled with current regimen  Patient reported as increasing lethargy with minimal interactivity  Per description per daughter, PPS 20-30%  Adequate supply of lorazepam + haloperidol + oxycodone  Also discussed hospice model of care, patient's daughter shares concern that introduction of new service would cause distress to patient; as patient has managed medical course over the past six years in a similar fashion  Discussed bereavement services for family unit support; daughter agreeable  Will continue to manage end-of-life care through Palliative Care  Consult placed to  for hospice referral for bereavement services only      Rubia Mcpherson MD  Fellow, Palliative and Supportive Care

## 2021-02-05 DIAGNOSIS — C22.1 CHOLANGIOCARCINOMA (HCC): Primary | ICD-10-CM

## 2021-02-05 DIAGNOSIS — G89.3 CANCER RELATED PAIN: ICD-10-CM

## 2021-02-05 DIAGNOSIS — C22.1 CHOLANGIOCARCINOMA (HCC): ICD-10-CM

## 2021-02-05 DIAGNOSIS — C16.2 MALIGNANT NEOPLASM OF BODY OF STOMACH (HCC): ICD-10-CM

## 2021-02-05 DIAGNOSIS — C16.2 MALIGNANT NEOPLASM OF BODY OF STOMACH (HCC): Primary | ICD-10-CM

## 2021-02-05 DIAGNOSIS — E43 SEVERE PROTEIN-CALORIE MALNUTRITION (HCC): ICD-10-CM

## 2021-02-05 RX ORDER — MORPHINE SULFATE 100 MG/5ML
45 SOLUTION, CONCENTRATE ORAL EVERY 4 HOURS
Qty: 60 ML | Refills: 0 | Status: SHIPPED | OUTPATIENT
Start: 2021-02-05

## 2021-02-05 NOTE — TELEPHONE ENCOUNTER
[phone]/ Christin/ JORJE Muir [address]/  Jorje's daughter calling in, states father seems very uncomfortable

## 2021-02-05 NOTE — TELEPHONE ENCOUNTER
Daughter calling office back stating pt is having trouble swallowing  Pt gurgles as he struggles to swallow medication

## 2021-02-05 NOTE — TELEPHONE ENCOUNTER
Discussed with patient's daughter regarding patient's continued decline  Appears uncomfortable requiring oxy-IR 10-20 mg PO Q3H + lorazepam 1 mg PO Q3H  Increasing difficulty tolerating PO liquids  Continued rattling sounds  PPS 20% per patient's daughter description  Will reach out to hospice liaison to discuss urgent open for home hospice enrollment      Jesse Graham MD  Fellow, Palliative and Supportive Care
